# Patient Record
Sex: MALE | Race: WHITE
[De-identification: names, ages, dates, MRNs, and addresses within clinical notes are randomized per-mention and may not be internally consistent; named-entity substitution may affect disease eponyms.]

---

## 2017-05-07 ENCOUNTER — HOSPITAL ENCOUNTER (INPATIENT)
Dept: HOSPITAL 75 - ER | Age: 27
LOS: 2 days | Discharge: HOME HEALTH | DRG: 482 | End: 2017-05-09
Attending: FAMILY MEDICINE | Admitting: FAMILY MEDICINE
Payer: MEDICAID

## 2017-05-07 VITALS — WEIGHT: 165.19 LBS | BODY MASS INDEX: 21.2 KG/M2 | HEIGHT: 74 IN

## 2017-05-07 VITALS — SYSTOLIC BLOOD PRESSURE: 124 MMHG | DIASTOLIC BLOOD PRESSURE: 79 MMHG

## 2017-05-07 VITALS — SYSTOLIC BLOOD PRESSURE: 125 MMHG | DIASTOLIC BLOOD PRESSURE: 86 MMHG

## 2017-05-07 VITALS — SYSTOLIC BLOOD PRESSURE: 124 MMHG | DIASTOLIC BLOOD PRESSURE: 76 MMHG

## 2017-05-07 VITALS — DIASTOLIC BLOOD PRESSURE: 80 MMHG | SYSTOLIC BLOOD PRESSURE: 118 MMHG

## 2017-05-07 VITALS — DIASTOLIC BLOOD PRESSURE: 79 MMHG | SYSTOLIC BLOOD PRESSURE: 124 MMHG

## 2017-05-07 DIAGNOSIS — E10.40: ICD-10-CM

## 2017-05-07 DIAGNOSIS — S01.511A: ICD-10-CM
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
## 2017-05-07 DIAGNOSIS — J45.909: ICD-10-CM

## 2017-05-07 DIAGNOSIS — S72.142A: Primary | ICD-10-CM
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
## 2017-05-07 DIAGNOSIS — K21.9: ICD-10-CM
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
## 2017-05-07 DIAGNOSIS — Z79.4: ICD-10-CM

## 2017-05-07 LAB
ALBUMIN SERPL-MCNC: 3.9 G/DL (ref 3.2–4.5)
ALT SERPL-CCNC: 22 U/L (ref 0–55)
ANION GAP SERPL CALC-SCNC: 16 MMOL/L (ref 5–14)
AST SERPL-CCNC: 18 U/L (ref 5–34)
BASOPHILS # BLD AUTO: 0 10^3/UL (ref 0–0.1)
BASOPHILS NFR BLD AUTO: 0 % (ref 0–10)
BILIRUB SERPL-MCNC: 0.4 MG/DL (ref 0.1–1)
BUN SERPL-MCNC: 7 MG/DL (ref 7–18)
BUN/CREAT SERPL: 7
CALCIUM SERPL-MCNC: 9.4 MG/DL (ref 8.5–10.1)
CHLORIDE SERPL-SCNC: 102 MMOL/L (ref 98–107)
CO2 SERPL-SCNC: 22 MMOL/L (ref 21–32)
CREAT SERPL-MCNC: 0.95 MG/DL (ref 0.6–1.3)
EOSINOPHIL # BLD AUTO: 0.4 10^3/UL (ref 0–0.3)
EOSINOPHIL NFR BLD AUTO: 3 % (ref 0–10)
ERYTHROCYTE [DISTWIDTH] IN BLOOD BY AUTOMATED COUNT: 11.9 % (ref 10–14.5)
ETHANOL SERPL-MCNC: < 10 MG/DL (ref ?–10)
GFR SERPLBLD BASED ON 1.73 SQ M-ARVRAT: > 60 ML/MIN
GLUCOSE SERPL-MCNC: 230 MG/DL (ref 70–105)
INR PPP: 1 (ref 0.8–1.4)
LYMPHOCYTES # BLD AUTO: 2.5 X 10^3 (ref 1–4)
LYMPHOCYTES NFR BLD AUTO: 17 % (ref 12–44)
MCH RBC QN AUTO: 32 PG (ref 25–34)
MCHC RBC AUTO-ENTMCNC: 35 G/DL (ref 32–36)
MCV RBC AUTO: 91 FL (ref 80–99)
MONOCYTES # BLD AUTO: 0.8 X 10^3 (ref 0–1)
MONOCYTES NFR BLD AUTO: 6 % (ref 0–12)
NEUTROPHILS # BLD AUTO: 10.9 X 10^3 (ref 1.8–7.8)
NEUTROPHILS NFR BLD AUTO: 74 % (ref 42–75)
PLATELET # BLD: 387 10^3/UL (ref 130–400)
PMV BLD AUTO: 9.4 FL (ref 7.4–10.4)
POTASSIUM SERPL-SCNC: 3.6 MMOL/L (ref 3.6–5)
PROT SERPL-MCNC: 6.3 G/DL (ref 6.4–8.2)
PROTHROMBIN TIME: 13.1 SEC (ref 12.2–14.7)
RBC # BLD AUTO: 4.25 10^6/UL (ref 4.35–5.85)
SODIUM SERPL-SCNC: 140 MMOL/L (ref 135–145)
WBC # BLD AUTO: 14.6 10^3/UL (ref 4.3–11)

## 2017-05-07 PROCEDURE — 83036 HEMOGLOBIN GLYCOSYLATED A1C: CPT

## 2017-05-07 PROCEDURE — 80048 BASIC METABOLIC PNL TOTAL CA: CPT

## 2017-05-07 PROCEDURE — 94664 DEMO&/EVAL PT USE INHALER: CPT

## 2017-05-07 PROCEDURE — 82962 GLUCOSE BLOOD TEST: CPT

## 2017-05-07 PROCEDURE — 36415 COLL VENOUS BLD VENIPUNCTURE: CPT

## 2017-05-07 PROCEDURE — 87081 CULTURE SCREEN ONLY: CPT

## 2017-05-07 PROCEDURE — 96374 THER/PROPH/DIAG INJ IV PUSH: CPT

## 2017-05-07 PROCEDURE — 73502 X-RAY EXAM HIP UNI 2-3 VIEWS: CPT

## 2017-05-07 PROCEDURE — 80053 COMPREHEN METABOLIC PANEL: CPT

## 2017-05-07 PROCEDURE — 72170 X-RAY EXAM OF PELVIS: CPT

## 2017-05-07 PROCEDURE — 0QS706Z REPOSITION LEFT UPPER FEMUR WITH INTRAMEDULLARY INTERNAL FIXATION DEVICE, OPEN APPROACH: ICD-10-PCS | Performed by: ORTHOPAEDIC SURGERY

## 2017-05-07 PROCEDURE — 85610 PROTHROMBIN TIME: CPT

## 2017-05-07 PROCEDURE — 80320 DRUG SCREEN QUANTALCOHOLS: CPT

## 2017-05-07 PROCEDURE — 85025 COMPLETE CBC W/AUTO DIFF WBC: CPT

## 2017-05-07 PROCEDURE — 71010: CPT

## 2017-05-07 PROCEDURE — 96375 TX/PRO/DX INJ NEW DRUG ADDON: CPT

## 2017-05-07 RX ADMIN — MORPHINE SULFATE PRN MG: 4 INJECTION, SOLUTION INTRAMUSCULAR; INTRAVENOUS at 19:43

## 2017-05-07 RX ADMIN — HYDROMORPHONE HYDROCHLORIDE PRN MG: 2 INJECTION, SOLUTION INTRAMUSCULAR; INTRAVENOUS; SUBCUTANEOUS at 09:32

## 2017-05-07 RX ADMIN — INSULIN HUMAN SCH UNIT: 100 INJECTION, SOLUTION PARENTERAL at 12:05

## 2017-05-07 RX ADMIN — INSULIN HUMAN SCH UNIT: 100 INJECTION, SOLUTION PARENTERAL at 17:43

## 2017-05-07 RX ADMIN — MEPERIDINE HYDROCHLORIDE PRN MG: 50 INJECTION INTRAMUSCULAR; INTRAVENOUS; SUBCUTANEOUS at 13:29

## 2017-05-07 RX ADMIN — Medication SCH ML: at 06:26

## 2017-05-07 RX ADMIN — Medication SCH ML: at 13:43

## 2017-05-07 RX ADMIN — SODIUM CHLORIDE SCH MLS/HR: 900 INJECTION INTRAVENOUS at 21:02

## 2017-05-07 RX ADMIN — INSULIN HUMAN SCH UNIT: 100 INJECTION, SOLUTION PARENTERAL at 06:27

## 2017-05-07 RX ADMIN — SODIUM CHLORIDE SCH MLS/HR: 900 INJECTION, SOLUTION INTRAVENOUS at 15:39

## 2017-05-07 RX ADMIN — OXYCODONE HYDROCHLORIDE AND ACETAMINOPHEN PRN TAB: 5; 325 TABLET ORAL at 21:12

## 2017-05-07 RX ADMIN — HYDROCODONE BITARTRATE AND ACETAMINOPHEN PRN TAB: 5; 325 TABLET ORAL at 15:35

## 2017-05-07 RX ADMIN — MEPERIDINE HYDROCHLORIDE PRN MG: 50 INJECTION INTRAMUSCULAR; INTRAVENOUS; SUBCUTANEOUS at 13:23

## 2017-05-07 RX ADMIN — Medication SCH ML: at 22:08

## 2017-05-07 RX ADMIN — MORPHINE SULFATE PRN MG: 4 INJECTION, SOLUTION INTRAMUSCULAR; INTRAVENOUS at 14:24

## 2017-05-07 RX ADMIN — SODIUM CHLORIDE SCH MLS/HR: 900 INJECTION, SOLUTION INTRAVENOUS at 13:43

## 2017-05-07 RX ADMIN — HYDROMORPHONE HYDROCHLORIDE PRN MG: 2 INJECTION, SOLUTION INTRAMUSCULAR; INTRAVENOUS; SUBCUTANEOUS at 06:28

## 2017-05-07 RX ADMIN — SODIUM CHLORIDE SCH MLS/HR: 900 INJECTION, SOLUTION INTRAVENOUS at 06:27

## 2017-05-07 RX ADMIN — HYDROCODONE BITARTRATE AND ACETAMINOPHEN PRN TAB: 5; 325 TABLET ORAL at 19:43

## 2017-05-07 RX ADMIN — SODIUM CHLORIDE, SODIUM LACTATE, POTASSIUM CHLORIDE, AND CALCIUM CHLORIDE PRN MLS/HR: 600; 310; 30; 20 INJECTION, SOLUTION INTRAVENOUS at 11:36

## 2017-05-07 RX ADMIN — SODIUM CHLORIDE, SODIUM LACTATE, POTASSIUM CHLORIDE, AND CALCIUM CHLORIDE PRN MLS/HR: 600; 310; 30; 20 INJECTION, SOLUTION INTRAVENOUS at 12:25

## 2017-05-07 RX ADMIN — WARFARIN SODIUM SCH MG: 5 TABLET ORAL at 17:46
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
## 2017-05-07 NOTE — PROGRESS NOTE-POST OPERATIVE
Post-Operative Progess Note


Surgeon (s)/Assistant (s)


Surgeon


IZABELLA WILKINSON MD


Assistant:  None





Pre-Operative Diagnosis


left hip closed intertroch fracture displaced initial encounter





Post-Operative Diagnosis





Same





Procedure & Operative Findings


Date of Procedure


5/7/17


Procedure Preformed/Findings


open reduction internal fixation with intramedullary nail (Synthes TFN). 

Anatomic reduction, two distal screws added


Anesthesia Type


General





Estimated Blood Loss


Estimated blood loss (mL):  100 ml





Specimens/Packing


Specimens Removed


None


Packing:  


none











IZABELLA WILKINSON MD May 7, 2017 13:07
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
## 2017-05-08 VITALS — SYSTOLIC BLOOD PRESSURE: 126 MMHG | DIASTOLIC BLOOD PRESSURE: 75 MMHG

## 2017-05-08 VITALS — DIASTOLIC BLOOD PRESSURE: 72 MMHG | SYSTOLIC BLOOD PRESSURE: 119 MMHG

## 2017-05-08 VITALS — DIASTOLIC BLOOD PRESSURE: 67 MMHG | SYSTOLIC BLOOD PRESSURE: 121 MMHG

## 2017-05-08 VITALS — SYSTOLIC BLOOD PRESSURE: 127 MMHG | DIASTOLIC BLOOD PRESSURE: 69 MMHG

## 2017-05-08 VITALS — SYSTOLIC BLOOD PRESSURE: 112 MMHG | DIASTOLIC BLOOD PRESSURE: 68 MMHG

## 2017-05-08 LAB
INR PPP: 1.1 (ref 0.8–1.4)
PROTHROMBIN TIME: 13.7 SEC (ref 12.2–14.7)

## 2017-05-08 RX ADMIN — MORPHINE SULFATE PRN MG: 4 INJECTION, SOLUTION INTRAMUSCULAR; INTRAVENOUS at 10:28

## 2017-05-08 RX ADMIN — HYDROCODONE BITARTRATE AND ACETAMINOPHEN PRN TAB: 5; 325 TABLET ORAL at 23:31

## 2017-05-08 RX ADMIN — HYDROCODONE BITARTRATE AND ACETAMINOPHEN PRN TAB: 5; 325 TABLET ORAL at 19:10

## 2017-05-08 RX ADMIN — MORPHINE SULFATE PRN MG: 4 INJECTION, SOLUTION INTRAMUSCULAR; INTRAVENOUS at 13:02

## 2017-05-08 RX ADMIN — INSULIN HUMAN SCH UNIT: 100 INJECTION, SOLUTION PARENTERAL at 06:04

## 2017-05-08 RX ADMIN — SODIUM CHLORIDE SCH MLS/HR: 900 INJECTION, SOLUTION INTRAVENOUS at 06:07

## 2017-05-08 RX ADMIN — INSULIN HUMAN SCH UNIT: 100 INJECTION, SOLUTION PARENTERAL at 19:15

## 2017-05-08 RX ADMIN — HYDROCODONE BITARTRATE AND ACETAMINOPHEN PRN TAB: 5; 325 TABLET ORAL at 06:56

## 2017-05-08 RX ADMIN — Medication SCH ML: at 12:55

## 2017-05-08 RX ADMIN — HYDROCODONE BITARTRATE AND ACETAMINOPHEN PRN TAB: 5; 325 TABLET ORAL at 02:52

## 2017-05-08 RX ADMIN — INSULIN HUMAN SCH UNIT: 100 INJECTION, SOLUTION PARENTERAL at 12:55

## 2017-05-08 RX ADMIN — Medication SCH ML: at 23:27

## 2017-05-08 RX ADMIN — SODIUM CHLORIDE SCH MLS/HR: 900 INJECTION INTRAVENOUS at 05:02

## 2017-05-08 RX ADMIN — HYDROCODONE BITARTRATE AND ACETAMINOPHEN PRN TAB: 5; 325 TABLET ORAL at 18:35

## 2017-05-08 RX ADMIN — MORPHINE SULFATE PRN MG: 4 INJECTION, SOLUTION INTRAMUSCULAR; INTRAVENOUS at 02:52

## 2017-05-08 RX ADMIN — WARFARIN SODIUM SCH MG: 5 TABLET ORAL at 18:35

## 2017-05-08 RX ADMIN — MORPHINE SULFATE PRN MG: 4 INJECTION, SOLUTION INTRAMUSCULAR; INTRAVENOUS at 00:09

## 2017-05-08 RX ADMIN — INSULIN HUMAN SCH UNIT: 100 INJECTION, SOLUTION PARENTERAL at 00:01

## 2017-05-08 RX ADMIN — Medication SCH ML: at 06:04

## 2017-05-08 RX ADMIN — OXYCODONE HYDROCHLORIDE AND ACETAMINOPHEN PRN TAB: 5; 325 TABLET ORAL at 04:47
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
## 2017-05-08 NOTE — ANESTHESIA-GENERAL POST-OP
General


Patient Condition


Mental Status/LOC:  Same as Preop


Cardiovascular:  Satisfactory


Nausea/Vomiting:  Absent


Respiratory:  Satisfactory


Pain:  Controlled


Complications:  Absent





Post Op Complications


Complications


None





Follow Up Care/Instructions


Patient Instructions


None needed.





Anesthesia/Patient Condition


Patient Condition


Patient is doing well, no complaints, stable vital signs, no apparent adverse 

anesthesia problems.   


No complications reported per nursing.











ERNESTO COPPOLA CRNA May 8, 2017 10:51

## 2017-05-08 NOTE — PROGRESS NOTE-STANDARD
Standard Progress Note


Progress Notes/Assess & Plan


Progress/Assessment & Plan


Subjective: Patient reports expected post op discomfort. He is able to bear 

weight but reports some stiffness. He denies fever and any other complaints.


Objective: Pt alert and oriented, dressings intact, Has soft thigh and calf. He 

is able to tolerate gentle AAROM. Distal neurovascular status is intact.


Assessment: S/P intertrochanteric Fx ORIF





Plan: Continue with PT and current treatment plan. Anticipate discharge 

tomorrow. Will f/u in our clinic in 2weeks.











STEPHANIE NINO MD May 8, 2017 11:36

## 2017-05-08 NOTE — PROGRESS NOTE-STANDARD
Standard Progress Note


Progress Notes/Assess & Plan


Progress/Assessment & Plan


S-  Pain much better





O- The patient is alert and orietated.


gentle AAROM left hip and knee without untoward discomfort.





A- Stable orthowise POD#1 from ORIF left femur intertroch fx with IM nail





P- OK to D/C to home full weight bear today. 


F/U with Dr. Finley 8 - 12 days


Home on  QD for DVT prophylaxis.


Use crurches for 2 -4 weeks with weight bear as tolerated, goal of full weight 

bearing ASAP. AVOID NON-WEIGHT BEARING.


Final Diagnosis


Ortho diagnosis:   Status Post left hip intertrochanteric fx ORIF.











IZABELLA WILKINSON MD May 8, 2017 09:01
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
## 2017-05-08 NOTE — PHYSICAL THERAPY DAILY NOTE
PT Daily Note-Current


Subjective


Patient agrees to therapy.





Pain





   Numeric Pain Scale:  8


   Location:  Left


   Location Body Site:  Hip


   Pain Description:  Acute





Mental Status


Patient Orientation:  Normal For Age





Transfers


              Functional [place] Measure


0=Not Assessed/NA   4=Minimal Assistance


1=Total Assistance   5=Supervision or Setup


2=Maximal Assistance   6=Modified [place]


3=Moderate Assistance   7=Complete IndependenceIRFPAI Quality Coding Scale











6 Independent with activity with or without an assistive device


 


5  Patient requires set up or clean up by helper.  Patient completes activity  

by  themselves


 


4 Supervision or touching assist (CGA). [place] provide cues , steadying assist


 


3 The helper provides less than half the effort to complete the activity


 


2 The helper provides more than half the effort to complete the activity


 


1 Dependent.  The helper does all the effort to complete an activity 


 


7 Patient refused to complete or attempt activity


 


9 The patient did not perform the activity before the current illness or injury


 


88 Not attempted due to Medical conditions or safety concerns








Transfers (B, C, W/C) (FIM):  5


Scootin


Rollin


Supine to/from Sit:  5


Sit to/from Stand:  5


adamantly declined to sit in recliner due to left LE pain





Weight Bearing


Weight Bearing Restriction:  Weight Bearing/Tolerated


Location Restriction:  L LE





Gait Training


Gait (FIM):  2


Distance (FIM):  8=617-32 ft


Distance:  75'


Gait Level of Assist:  5


Gait Persons Needed:  1


Gait Assistive Device:  FWW


minimal weight bearing left LE due to pain.  Patient prefers to be TTWB left LE 

for comfort.





Exercises


Seated Therapy Exercises:  Ankle pumps, Long arc quads


Seated Reps:  15





Assessment


Patient tolerated treatment well and is sitting EOB with OT in to assess 

equipment needs.  PT to increase activity as tolerated by patient.





PT Long Term Goals


Long Term Goals


PT Long Term Goals Time Frame:  May 15, 2017


Transfers (B,C,W/C) (FIM):  6


Gait (FIM):  6


Gait distance (FIM):  3=150 ft


Gait Level of Assist:  6


Gait Assistive Device:  FWW


Stairs (FIM):  2


# of Steps:  4


Stairs Level Of Assist:  6





PT Plan


Treatment/Plan


Treatment Plan:  Continue Plan of Care


Treatment Plan:  Bed Mobility, Education, Functional Activity Frank, Functional 

Strength, Gait, Safety, Therapeutic Exercise, Transfers


Treatment Duration:  May 15, 2017


Visits Per Week:  11





Time/GCodes


Time In:  1135


Time Out:  1145


Total Billed Treatment Time:  10


Total Billed Treatment


1 visit


FA 10 min











ELIA ZHANG PT May 8, 2017 11:56

## 2017-05-09 VITALS — DIASTOLIC BLOOD PRESSURE: 72 MMHG | SYSTOLIC BLOOD PRESSURE: 112 MMHG

## 2017-05-09 VITALS — SYSTOLIC BLOOD PRESSURE: 130 MMHG | DIASTOLIC BLOOD PRESSURE: 78 MMHG

## 2017-05-09 LAB
ANION GAP SERPL CALC-SCNC: 8 MMOL/L (ref 5–14)
BASOPHILS # BLD AUTO: 0 10^3/UL (ref 0–0.1)
BASOPHILS NFR BLD AUTO: 0 % (ref 0–10)
BUN SERPL-MCNC: 6 MG/DL (ref 7–18)
BUN/CREAT SERPL: 9
CALCIUM SERPL-MCNC: 8.5 MG/DL (ref 8.5–10.1)
CHLORIDE SERPL-SCNC: 103 MMOL/L (ref 98–107)
CO2 SERPL-SCNC: 26 MMOL/L (ref 21–32)
CREAT SERPL-MCNC: 0.69 MG/DL (ref 0.6–1.3)
EOSINOPHIL # BLD AUTO: 0.6 10^3/UL (ref 0–0.3)
EOSINOPHIL NFR BLD AUTO: 6 % (ref 0–10)
ERYTHROCYTE [DISTWIDTH] IN BLOOD BY AUTOMATED COUNT: 11.8 % (ref 10–14.5)
GFR SERPLBLD BASED ON 1.73 SQ M-ARVRAT: > 60 ML/MIN
GLUCOSE SERPL-MCNC: 151 MG/DL (ref 70–105)
INR PPP: 1.2 (ref 0.8–1.4)
LYMPHOCYTES # BLD AUTO: 3.4 X 10^3 (ref 1–4)
LYMPHOCYTES NFR BLD AUTO: 35 % (ref 12–44)
MCH RBC QN AUTO: 31 PG (ref 25–34)
MCHC RBC AUTO-ENTMCNC: 34 G/DL (ref 32–36)
MCV RBC AUTO: 94 FL (ref 80–99)
MONOCYTES # BLD AUTO: 0.9 X 10^3 (ref 0–1)
MONOCYTES NFR BLD AUTO: 10 % (ref 0–12)
NEUTROPHILS # BLD AUTO: 4.7 X 10^3 (ref 1.8–7.8)
NEUTROPHILS NFR BLD AUTO: 49 % (ref 42–75)
PLATELET # BLD: 239 10^3/UL (ref 130–400)
PMV BLD AUTO: 10.2 FL (ref 7.4–10.4)
POTASSIUM SERPL-SCNC: 3.4 MMOL/L (ref 3.6–5)
PROTHROMBIN TIME: 14.7 SEC (ref 12.2–14.7)
RBC # BLD AUTO: 3.5 10^6/UL (ref 4.35–5.85)
SODIUM SERPL-SCNC: 137 MMOL/L (ref 135–145)
WBC # BLD AUTO: 9.7 10^3/UL (ref 4.3–11)

## 2017-05-09 RX ADMIN — INSULIN HUMAN SCH UNIT: 100 INJECTION, SOLUTION PARENTERAL at 06:02

## 2017-05-09 RX ADMIN — Medication SCH ML: at 05:08

## 2017-05-09 RX ADMIN — HYDROCODONE BITARTRATE AND ACETAMINOPHEN PRN TAB: 5; 325 TABLET ORAL at 11:57

## 2017-05-09 RX ADMIN — HYDROCODONE BITARTRATE AND ACETAMINOPHEN PRN TAB: 5; 325 TABLET ORAL at 05:08

## 2017-05-09 RX ADMIN — MORPHINE SULFATE PRN MG: 4 INJECTION, SOLUTION INTRAMUSCULAR; INTRAVENOUS at 08:43

## 2017-05-09 RX ADMIN — INSULIN HUMAN SCH UNIT: 100 INJECTION, SOLUTION PARENTERAL at 00:02

## 2017-05-09 RX ADMIN — INSULIN HUMAN SCH UNIT: 100 INJECTION, SOLUTION PARENTERAL at 11:51
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
## 2017-05-09 NOTE — PHYSICAL THERAPY DAILY NOTE
PT Daily Note-Current


Subjective


Patient agrees to PT.





Pain





   Numeric Pain Scale:  10-Worst Possible Pain


   Location:  Left


   Location Body Site:  Hip


   Pain Description:  Acute





Mental Status


Patient Orientation:  Normal For Age





Transfers


              Functional [place] Measure


0=Not Assessed/NA   4=Minimal Assistance


1=Total Assistance   5=Supervision or Setup


2=Maximal Assistance   6=Modified [place]


3=Moderate Assistance   7=Complete IndependenceIRFPAI Quality Coding Scale











6 Independent with activity with or without an assistive device


 


5  Patient requires set up or clean up by helper.  Patient completes activity  

by  themselves


 


4 Supervision or touching assist (CGA). [place] provide cues , steadying assist


 


3 The helper provides less than half the effort to complete the activity


 


2 The helper provides more than half the effort to complete the activity


 


1 Dependent.  The helper does all the effort to complete an activity 


 


7 Patient refused to complete or attempt activity


 


9 The patient did not perform the activity before the current illness or injury


 


88 Not attempted due to Medical conditions or safety concerns








Transfers (B, C, W/C) (FIM):  6


Scootin


Rollin


Supine to/from Sit:  6


Sit to/from Stand:  6





Gait Training


Gait (FIM):  6


Distance (FIM):  3=150 ft


Distance:  150'


Gait Level of Assist:  6


Gait Assistive Device:  FWW


antalgic, WBAT left LE with patient demonstrating TTWB left LE.  FWW issued for 

home use





Stair Training


adamantly declined stair training; education with patient on performing steps 

to ensure safe return to home, patient continued to become agitated and 

declined to attempt





Assessment


Patient is currently at [facility] with gross motor skill.  

Patient declined to perform stair training due to pain left LE even after 

education on importance of demonstrating good technique to ensure safe return 

to home with family.





PT Long Term Goals


Long Term Goals


PT Long Term Goals Time Frame:  May 15, 2017


Transfers (B,C,W/C) (FIM):  6


Gait (FIM):  6


Gait distance (FIM):  3=150 ft


Gait Level of Assist:  6


Gait Assistive Device:  FWW


Stairs (FIM):  2


# of Steps:  4


Stairs Level Of Assist:  6





PT Plan


Treatment/Plan


Treatment Plan:  Discontinue PT


Treatment Plan:  Bed Mobility, Education, Functional Activity Frank, Functional 

Strength, Gait, Safety, Therapeutic Exercise, Transfers


Treatment Duration:  May 15, 2017


Visits Per Week:  11





Time/GCodes


Time In:  925


Time Out:  940


Total Billed Treatment Time:  15


Total Billed Treatment


1 visit


GT 15 min











ELIA ZHANG PT May 9, 2017 09:58
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
## 2017-06-02 NOTE — OPERATIVE REPORT
DATE OF SERVICE:  05/07/2017



NOTE:  

Initial dictation either lost or omitted.  This is a redictation utilizing notes

and memory.



PREOPERATIVE DIAGNOSES:

Left hip intertrochanteric fracture with some subtrochanteric extension.



POSTOPERATIVE DIAGNOSIS:

Left hip intertrochanteric fracture with some subtrochanteric extension.



PROCEDURE:

Surgery on Left hip to line up bone and fix with hardware (internal fixation

with intramedullary Synthes TFN nail).



SURGEON:

Pa Evangelista M.D.



COMPLICATIONS:

None.



BLOOD LOSS:

Minimal.



SPECIMENS:

None.



FINDINGS:

Adequate alignment was obtained and adequate fixation was also utilized.  Final

construct looked near anatomic and should heal well.



NARRATIVE SUMMARY:

The patient was taken to the operating room after standard nursing and

anesthesia, preoperative identification, evaluation and counseling.  The lower

extremity was prepped and draped in the usual orthopedic fashion.  A C-arm was

used to locate the appropriate approach while the patient was on the fracture

table.  Appropriate manipulation of lower extremity was accomplished to get a

good look at the reduced intertrochanteric area, the proximal femur. 

Longitudinal incision was made above the greater trochanter approximately 6-8 cm

in length.  Soft tissue dissection was accomplished down to where I could

palpate that greater trochanter through a small incision in the iliotibial band.

 C-arm was used to place a guidewire followed by a proximal reamer.  Some distal

reaming was accomplished and the TFN nail was then placed in the femur itself. 

The depth of the nail was guided by the C-arm visualization.  Rotation was also

checked in the AP and lateral using the guide hole and the IM nail carbon fiber

handle.  Guidewire was then placed down the femoral neck entering through the

lateral femur, cortex.  Once the orientation of the guidewire was properly

aligned, then a lateral drill hole followed by a deep drill followed by placing

the helical blade was all accomplished and, of course, this was all done with

the nail whose length had been determined ahead of time to be appropriate using

C-arm.  Distal fixation screws were placed.  Wounds were irrigated.  Wounds were

then closed in 2 layers.  The patient was then taken to recovery in stable

condition.





Job ID: [id number]

DocumentID: [id number]

Dictated Date:  06/01/2017 15:55:50

Transcription Date: 06/02/2017 02:27:34

Dictated By: MD ARTIE CESPEDES

## 2018-09-08 ENCOUNTER — HOSPITAL ENCOUNTER (EMERGENCY)
Dept: HOSPITAL 75 - ER | Age: 28
Discharge: HOME | End: 2018-09-08
Payer: MEDICAID

## 2018-09-08 VITALS — DIASTOLIC BLOOD PRESSURE: 97 MMHG | SYSTOLIC BLOOD PRESSURE: 126 MMHG

## 2018-09-08 VITALS — WEIGHT: 160 LBS | HEIGHT: 70 IN | BODY MASS INDEX: 22.9 KG/M2

## 2018-09-08 DIAGNOSIS — F12.10: ICD-10-CM

## 2018-09-08 DIAGNOSIS — K21.9: ICD-10-CM

## 2018-09-08 DIAGNOSIS — E10.42: ICD-10-CM

## 2018-09-08 DIAGNOSIS — Z79.4: ICD-10-CM

## 2018-09-08 DIAGNOSIS — J45.909: ICD-10-CM

## 2018-09-08 DIAGNOSIS — Z87.19: ICD-10-CM

## 2018-09-08 DIAGNOSIS — Z88.8: ICD-10-CM

## 2018-09-08 DIAGNOSIS — Z79.01: ICD-10-CM

## 2018-09-08 DIAGNOSIS — F41.9: ICD-10-CM

## 2018-09-08 DIAGNOSIS — L03.113: ICD-10-CM

## 2018-09-08 DIAGNOSIS — Z80.8: ICD-10-CM

## 2018-09-08 DIAGNOSIS — F31.9: ICD-10-CM

## 2018-09-08 DIAGNOSIS — L02.413: Primary | ICD-10-CM

## 2018-09-08 DIAGNOSIS — Z77.22: ICD-10-CM

## 2018-09-08 PROCEDURE — 87186 SC STD MICRODIL/AGAR DIL: CPT

## 2018-09-08 PROCEDURE — 87205 SMEAR GRAM STAIN: CPT

## 2018-09-08 PROCEDURE — 87077 CULTURE AEROBIC IDENTIFY: CPT

## 2018-09-08 PROCEDURE — 87070 CULTURE OTHR SPECIMN AEROBIC: CPT

## 2018-09-08 PROCEDURE — 96372 THER/PROPH/DIAG INJ SC/IM: CPT

## 2018-09-08 PROCEDURE — 82962 GLUCOSE BLOOD TEST: CPT

## 2018-09-08 NOTE — ED INTEGUMENTARY GENERAL
General


Chief Complaint:  Skin/Wound Problems


Stated Complaint:  LYMPH NODE BURST


Nursing Triage Note:  


ARRIVED VIA AMB TO ROOM 10 WITH COMPLAINTS OF A SWOLLEN LYMPH NODE ON HIS BACK 


THAT OPENED UP AND DRAINED TODAY.  PT CAME BY AMBULANCE BECAUSE HE DID NOT HAVE 


A RIDE OR COULD AFFORD A TAXI.


Source:  patient


Exam Limitations:  no limitations





History of Present Illness


Date Seen by Provider:  Sep 8, 2018


Time Seen by Provider:  15:10


Initial Comments


Patient is a 28-year-old male who is brought into the emergency room with 

complaints of a "lymph node" on the back of his right shoulder. He reports that 

he's noticed the redness to the area 3-4 days and yesterday it turned into a 

very large pimple in this morning he opened up and drained. Patient did arrive 

to the emergency room by [facility] due to no form of transportation. 

The patient is a known diabetic and the glucometer read his blood sugar as 480. 

He does have an insulin pump and he ingested the pump and gave himself a bolus 

of insulin.


Timing/Duration:  week


Associated Symptoms:  blisters (pimple to his right shoulder blade.)





Allergies and Home Medications


Allergies


Coded Allergies:  


     fluoxetine (Verified  Allergy, Unknown, 6/22/09)


     trazodone (Unverified  Allergy, Unknown, 11/30/14)





Home Medications


Bisacodyl 5 Mg Tablet.dr, 5 MG PO DAILY PRN for CONSTIPATION-1ST LINE


   Prescribed by: LLUVIA GA on 5/9/17 1120


Hydrocodone Bit/Acetaminophen 1 Each Tablet, 1-2 TAB PO Q6H PRN for PAIN-

MODERATE TO SEVERE


   Prescribed by: LLUVIA GA on 5/9/17 1120


Insulin Regular, Human 100 Unit/1 Ml Vial, 10-15 UNIT SQ AC, (Reported)


   10-15 UNITS BASED ON CARB COUNTING 


Sulfamethoxazole/Trimethoprim 1 Each Tablet, 1 EACH PO BID


   Prescribed by: ANDI RILEY on 9/8/18 1627


Warfarin Sodium 5 Mg Tablet, 5 MG PO DAILY@1800


   Prescribed by: LLUVIA GA on 5/9/17 1120





Patient Home Medication List


Home Medication List Reviewed:  Yes





Review of Systems


Review of Systems


Constitutional:  see HPI; No chills, No fever


Skin:  see HPI, lesions (abscess to his right shoulder blade.)





All Other Systems Reviewed


Negative Unless Noted:  Yes





Past Medical-Social-Family Hx


Past Med/Social Hx:  Reviewed Nursing Past Med/Soc Hx


Patient Social History


Drug of Choice:  MARIJUANA-EVERY ONCE IN AWHILE


Type Used:  Cigarettes


2nd Hand Smoke Exposure:  Yes


Recent Foreign Travel:  No


Contact w/Someone Who Travel:  No


Recent Infectious Disease Expo:  No


Recent Hopitalizations:  No





Immunizations Up To Date


Tetanus Booster (TDap):  Less than 5yrs


PED Vaccines UTD:  Yes





Seasonal Allergies


Seasonal Allergies:  No





Past Medical History


Surgeries:  Yes (EGD)


Respiratory:  Yes


Asthma


Currently Using CPAP:  No


Currently Using BIPAP:  No


Cardiac:  No


Neurological:  Yes


Neuropathy


Reproductive Disorders:  No


Sexually Transmitted Disease:  No


HIV/AIDS:  No


Genitourinary:  Yes


Prostate Problems


Gastrointestinal:  Yes (current constipation)


Gastroesophageal Reflux, Liver Disease/Jaundice


Musculoskeletal:  No


Endocrine:  Yes (Type I)


Diabetes, Insulin dep


HEENT:  No


Cancer:  No


Psychosocial:  Yes


Anxiety, Bipolar, Depression


Integumentary:  Yes


Eczema


Blood Disorders:  No


Adverse Reaction/Blood Tranf:  No





Family Medical History


Reviewed Nursing Family Hx





Diabetes mellitus


  19 FATHER


  UNCLE


FH: COPD (chronic obstructive pulmonary disease)


  19 MOTHER


FH: brain cancer


  AUNT


FH: diverticulitis


  19 FATHER


Thyroid disease


  19 MOTHER


No Pertinent Family Hx





Physical Exam


Vital Signs





Vital Signs - First Documented








 9/8/18





 15:03


 


Temp 98.0


 


Pulse 121


 


Resp 18


 


B/P (MAP) 126/97 (107)


 


Pulse Ox 98


 


O2 Delivery Room Air





Capillary Refill : Less Than 3 Seconds


General Appearance:  WD/WN, no apparent distress


Cardiovascular:  normal peripheral pulses, regular rate, rhythm, no edema, no 

gallop, no JVD, no murmur


Respiratory:  chest non-tender, lungs clear, normal breath sounds, no 

respiratory distress, no accessory muscle use


Skin:  normal color, warm/dry


Skin Problem Location:  lower extremities (posterior right shoulder.)


Skin Problem Character:  abscess (open and draining abscess.), drainage (

serosanguineous drainage.), erythema (4 centimeter in diameter area of erythema 

around the central punctum that is open and draining)





Progress/Results/Core Measures


Results/Orders


Lab Results





Laboratory Tests








Test


 9/8/18


15:13 9/8/18


16:49 Range/Units


 


 


Glucometer 480 *H 306 H   MG/DL








Micro Results





Microbiology


9/8/18 Gram Stain - Final, Resulted


         


9/8/18 Wound Culture - Preliminary, Resulted


         Staphylococcus aureus





My Orders





Orders - ANDI RILEY


Wound Culture (9/8/18 15:38)


Ceftriaxone For Im Use (Rocephin For Im (9/8/18 15:45)


Lidocaine 1% Inj 20 Ml (Xylocaine 1% Inj (9/8/18 15:54)





Vital Signs/I&O











 9/8/18 9/8/18





 15:03 16:53


 


Temp 98.0 98.0


 


Pulse 121 121


 


Resp 18 18


 


B/P (MAP) 126/97 (107) 126/97 (107)


 


Pulse Ox 98 98


 


O2 Delivery Room Air 














Blood Pressure Mean:  107











FSBG Bedside Testing


Finger Stick Blood Glucose:  480


Blood Glucose Action Taken:  Rn notified





Progress


Progress Note :  


   Time:  16:20


Progress Note


Patient seen and evaluated the patient. Informed of the need for antibiotics. 

He was given a dose of Rocephin in the emergency room and sent home with a 

prescription for antibiotics. Culture was sent of the drainage. He agrees with 

complaints of care, signs or discharge, return precautions were given.





Departure


Impression





 Primary Impression:  


 Abscess


 Additional Impression:  


 Cellulitis


Disposition:  01 HOME, SELF-CARE


Condition:  Stable/Unchanged





Departure-Patient Inst.


Decision time for Depature:  16:26


Referrals:  


[facility] (PCP/Family)


Primary Care Physician


Patient Instructions:  Skin Abscess, Cellulitis (Skin Infection), Adult (DC)





Add. Discharge Instructions:  


Take medication as directed. Follow up with [facility] within 1 week for 

recheck. Return back to the emergency room for any worsening symptoms or 

concerns as needed. All discharge instructions reviewed with patient and/or 

family. Voiced understanding.


Scripts


Sulfamethoxazole/Trimethoprim (Bactrim Ds Tablet) 1 Each Tablet


1 EACH PO BID for 10 Days, #20 TAB


   Prov: ANDI RILEY         9/8/18











ANDI RILEY Sep 8, 2018 15:59

## 2019-07-03 ENCOUNTER — HOSPITAL ENCOUNTER (EMERGENCY)
Dept: HOSPITAL 75 - ER | Age: 29
Discharge: HOME | End: 2019-07-03
Payer: MEDICAID

## 2019-07-03 VITALS — HEIGHT: 70 IN | WEIGHT: 160.19 LBS | BODY MASS INDEX: 22.93 KG/M2

## 2019-07-03 VITALS — DIASTOLIC BLOOD PRESSURE: 92 MMHG | SYSTOLIC BLOOD PRESSURE: 132 MMHG

## 2019-07-03 DIAGNOSIS — Z88.8: ICD-10-CM

## 2019-07-03 DIAGNOSIS — F31.9: ICD-10-CM

## 2019-07-03 DIAGNOSIS — S91.311A: Primary | ICD-10-CM

## 2019-07-03 DIAGNOSIS — Z79.4: ICD-10-CM

## 2019-07-03 DIAGNOSIS — J45.909: ICD-10-CM

## 2019-07-03 DIAGNOSIS — Z79.01: ICD-10-CM

## 2019-07-03 DIAGNOSIS — F41.9: ICD-10-CM

## 2019-07-03 DIAGNOSIS — F17.210: ICD-10-CM

## 2019-07-03 DIAGNOSIS — F12.10: ICD-10-CM

## 2019-07-03 DIAGNOSIS — Z80.8: ICD-10-CM

## 2019-07-03 DIAGNOSIS — K21.9: ICD-10-CM

## 2019-07-03 DIAGNOSIS — E10.40: ICD-10-CM
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
## 2019-07-03 PROCEDURE — 90715 TDAP VACCINE 7 YRS/> IM: CPT

## 2019-07-03 NOTE — XMS REPORT
[facility]

                             Created on: 2019



Dat Ruiz

External Reference #: [id number]

: 1990

Sex: Male



Demographics







                          Address                   [address] [place]  [address]

 

                          Preferred Language        Unknown

 

                          Marital Status            Unknown

 

                          Judaism Affiliation     Unknown

 

                          Race                      Unknown

 

                          Ethnic Group              Unknown





Author







                          Author                    Migration,  Doctor

 

                          Organization              [facility]

 

                          Address                   Unknown

 

                          Phone                     Unavailable







Care Team Providers







                    Care Team Member Name    Role                Phone

 

                    Migration,  Doctor    Unavailable         Unavailable







PROBLEMS







          Type      Condition    ICD9-CM Code    AMI31-DR Code    Onset Dates    Condition Status    SNOMED

 Code

 

           Problem    Cigarette nicotine dependence without complication               F17.210               Active

                                        [id number]

 

             Problem      Erectile dysfunction due to diseases classified elsewhere                 N52.1          

                          Active                    [id number]

 

          Problem    Diabetes              E11.9               Active    [id number]

 

                Problem         Diabetic polyneuropathy associated with type 1 diabetes mellitus                    E10.42

                                        Active              [id number]







ALLERGIES

No Information



ENCOUNTERS







                Encounter       Location        Date            Diagnosis

 

                          [place] [address] [place] [address]

                          05 Mar, 2019               

 

                          [facility]     [id number] [place] [address]

                                         

 

                          [facility]     [id number] [place] [address]

                                         

 

                          [facility]     [id number] [place] [address]

                          28 Dec, 2018              Diabetes E11.9

 

                          [facility]     [id number] [place] [address]

                          25 Oct, 2018               

 

                          [facility]     [id number] [place] [address]

                          22 Oct, 2018               

 

                          [facility]     [id number] [place] [address]

                          19 Oct, 2018              Diabetic polyneuropathy associated with type 1 diabetes mellitus 

E10.42

 

                          [facility]     [id number] [place] [address]

                          15 Oct, 2018              Diabetes E11.9

 

                          [facility]     [id number] [place] [address]

                                         

 

                          [facility]     [address] [address]

                                        Diabetes E11.9

 

                          [facility]     [address] [place] [address]

                                         

 

                          [facility]     [address] [address]

                          14 May, 2018               

 

                          [facility]     [id number] [place] [address]

                                         

 

                          [facility]     [id number] [place] [address]

                                         

 

                          [facility]     [address] [place] [address]

                          20 Dec, 2017               

 

                          [facility]     [id number] [place] [address]

                          05 Dec, 2017               

 

                          [facility]     [address] [address]

                                         

 

                          [facility]     [id number] [place] [address]

                                         

 

                          [facility]     [address] [address]

                                        Diabetes E11.9

 

                          [facility]     [address] [address]

                          24 Aug, 2017              Type 1 diabetes mellitus with other neurologic complication E10.49



 

                          [facility]     [address] [address]

                          24 Aug, 2017               

 

                          [facility]     [address] [address]

                          04 Aug, 2017              Diabetes E11.9 ; Erectile dysfunction due to diseases classified 

elsewhere N52.1 ; Diabetic polyneuropathy associated with type 1 diabetes 
mellitus E10.42 and Cigarette nicotine dependence without complication F17.210

 

                          [facility]     [address] [address]

                                        Hip fracture, left, closed, with routine healing, subsequent encounter

 S72.002D and Allergy, initial encounter T78.40XA

 

                          [facility]     [address] [address]

                                         

 

                          [facility]     [id number] [place] [address]

                          30 May, 2017              Femur fracture, left S72.92XA

 

                          [facility]     [address] [address]

                          30 May, 2017              Femur fracture, left S72.92XA

 

                          [facility]     [address] [address]

                          16 May, 2017              Diabetes E11.9 and Femur fracture, left S72.92XA

 

                          [facility]    [address] [id number]

                          09 May, 2017               

 

                          [facility]    [id number] [place] [address]

                          31 May, 2016               

 

                          [facility]     [address] [address]

                          31 May, 2016               

 

                          [facility]     [id number] [place] [address]

                                         

 

                          [facility]     [id number] [place] [address]

                                         

 

                          [facility]     [id number] [place] [address]

                          24 Mar, 2015               

 

                          [facility]     [id number] [place] [address]

                          24 Mar, 2015               

 

                          [facility]     [address] [address]

                          04 Mar, 2015               

 

                          [facility]     [id number] [place] [address]

                          04 Mar, 2015               

 

                          [facility]     [address] [address]

                          04 Mar, 2015               

 

                          [facility] [place] [facility]     [address] [id number] [place] [address]

                          04 Mar, 2015               

 

                          [facility] [place] [facility]     [address] [id number] [place] [address]

                          30 Dec, 2014               

 

                          [facility] [place] [facility]     [address] [id number] [place] [address]

                          30 Dec, 2014               

 

                          [facility] [place] [facility]     [address] [id number] [place] [address]

                          18 Dec, 2014               

 

                          [facility] [place] [facility]     [address] [id number] [place] [address]

                          18 Dec, 2014               

 

                          [facility] [place] [facility]     [address] [id number] [place] [address]

                          15 Dec, 2014               

 

                          [facility] [place] [facility]     [address] [id number] [place] [address]

                          12 Dec, 2014               

 

                          [facility] [place] [facility]     [address] [id number] [place] [address]

                          12 Dec, 2014               

 

                          [facility] [place] [facility]     [address] [id number] [place] [address]

                          24 Oct, 2014               

 

                          [facility] [place] [facility]     [address] [id number] [place] [address]

                          24 Oct, 2014               

 

                          [facility] [place] [facility]     [address] [id number] [place] [address]

                          21 Oct, 2014               

 

                          [facility] [place] [facility]     [address] [id number] [address]

                          21 Oct, 2014               

 

                          [facility] [place] [facility]     [address] [id number] [place] [address]

                          16 Sep, 2014               

 

                          [facility] [place] [facility]     [address] [id number] [place] [address]

                          16 Sep, 2014               

 

                          [facility] [place] [facility]     [address] [id number] [place] [address]

                                         

 

                          [facility] [place] [facility]     [address] [id number] [place] [address]

                          20 2014               

 

                          [facility]     [address] [id number] [place] [address]

                                         

 

                          [facility] [place] [facility]     [address] [id number] [place] [address]

                          17 2014               

 

                          [facility] [place] [facility]     [address] [id number] [place] [address]

                          10 [place], 2014               

 

                          [facility] [place] [facility]     [address] [id number] [place] [address]

                          [address] [place], 2014               

 

                          [facility] [place] [facility]     [address] [id number] [place] [address]

                                         

 

                          [facility] [place] [facility]     [address] [id number] [place] [address]

                                         

 

                          [facility] [place] [facility]     [address] [id number] [place] [address]

                          15 2013               

 

                          [facility] [place] [facility]     [address] [id number] [place] [address]

                                         

 

                          [facility] [place] [facility]     [address] [id number] [place] [address]

                          14 2013               

 

                          [facility] [place] [facility]     [address] [facility] [id number] [place] [address]

                                         

 

                          [facility] [place] [facility]     [address] [id number] [place] [address]

                                         

 

                          [facility] [place] [facility]     [address] [id number] [place] [address]

                                         

 

                          [facility] [place] [facility]     [address] [id number] [place] [address]

                                         

 

                          [facility] [place] [facility]     [address] [facility] [id number] [place] [address]

                          29 Oct, 2013               

 

                          [facility] [place] [facility]     [address] [id number] [place] [address]

                          29 Oct, 2013               

 

                          [facility]     [id number] [address]

                          13 Aug, 2013               

 

                          [facility]     [address] [address]

                          23 May, 2013               

 

                          [facility]     [id number] [address]

                                         

 

                          [facility]     [address] [address]

                                         

 

                          [facility]     [address] [address]

                          12 Mar, 2013               

 

                          [facility]     [address] [address]

                          05 Mar, 2013               

 

                          [facility]     [address] [address]

                                         

 

                          [facility]     [address] [address]

                                         

 

                          [facility]     [address] [address]

                                         

 

                          [facility]     [address], [place] [address]

                          18 Dec, 2009               

 

                          [facility]     [address] [address]

                                         

 

                          [facility]     [id number] [address]

                          26 Oct, 2009               

 

                          [facility]     [id number] [address]

                          23 Oct, 2009               

 

                          [facility]     [address] [address]

                          14 Sep, 2009               







IMMUNIZATIONS

No Known Immunizations



SOCIAL HISTORY

Never Assessed



REASON FOR VISIT

[facility]



PLAN OF CARE





VITAL SIGNS





MEDICATIONS

Unknown Medications



RESULTS

No Results



PROCEDURES

No Known procedures



INSTRUCTIONS





MEDICATIONS ADMINISTERED

No Known Medications



MEDICAL (GENERAL) HISTORY







                    Type                Description         Date

 

                    Medical History     Diabetes             

 

                    Surgical History    Left hip surgery    

 

                    Surgical History    tonsilectomy         

 

                    Hospitalization History    [facility] for Left hip surgery

## 2019-07-03 NOTE — XMS REPORT
[facility]

                             Created on: 10/30/2018



Dat Ruiz

External Reference #: [id number]

: 1990

Sex: Male



Demographics







                          Address                   [address] [place]  [address]

 

                          Preferred Language        Unknown

 

                          Marital Status            Unknown

 

                          Baptism Affiliation     Unknown

 

                          Race                      Unknown

 

                          Ethnic Group              Unknown





Author







                          Author                    VERONICA MENDES

 

                          [facility]

 

                          Address                   [address] [place]  [address]



 

                          Phone                     [phone]







Care Team Providers







                    Care Team Member Name    Role                Phone

 

                    VERONICA MENDES     Unavailable         [phone]







PROBLEMS







          Type      Condition    ICD9-CM Code    EPP27-PW Code    Onset Dates    Condition Status    SNOMED

 Code

 

             Problem      Erectile dysfunction due to diseases classified elsewhere                 N52.1          

                          Active                    [id number]

 

           Problem    Cigarette nicotine dependence without complication               F17.210               Active

                                        [id number]

 

                Problem         Diabetic polyneuropathy associated with type 1 diabetes mellitus                    E10.42

                                        Active              [id number]

 

          Problem    Diabetes              E11.9               Active    [id number]







ALLERGIES

No Information



ENCOUNTERS







                Encounter       Location        Date            Diagnosis

 

                          [facility]     [id number] [place] [address]

                          25 Oct, 2018               

 

                          [facility]     [id number] [place] [address]

                          22 Oct, 2018               

 

                          [facility]     [id number] [place] [address]

                          19 Oct, 2018              Diabetic polyneuropathy associated with type 1 diabetes mellitus 

E10.42

 

                          [facility]     [id number] [address]

                          15 Oct, 2018              Diabetes E11.9

 

                          [facility]     [id number] [place] [address]

                                         

 

                          [facility]     [id number] [place] [address]

                                        Diabetes E11.9

 

                          [facility]     [id number] [place] [address]

                                         

 

                          [facility]     [id number] [address]

                          14 May, 2018               

 

                          [facility]     [address] [address] [address]

                                         

 

                          [facility]     [id number] [place] [address]

                                         

 

                          [facility]     [id number] [place] [address]

                          20 Dec, 2017               

 

                          [facility]     [id number] [place] [address]

                          05 Dec, 2017               

 

                          [facility]     [id number] [place] [address]

                                         

 

                          [place] [id number] [place] [address]

                                         

 

                          [place] [id number] [place] [address]

                                        Diabetes E11.9

 

                          [place] [id number] [place] [address]

                          24 Aug, 2017              Type 1 diabetes mellitus with other neurologic complication E10.49



 

                          [place] [id number] [place] [address]

                          24 Aug, 2017               

 

                          [place] [id number] [place] [address]

                          04 Aug, 2017              Diabetes E11.9 ; Erectile dysfunction due to diseases classified 

elsewhere N52.1 ; Diabetic polyneuropathy associated with type 1 diabetes 
mellitus E10.42 and Cigarette nicotine dependence without complication F17.210

 

                          [place] [id number] [place] [address]

                                        Hip fracture, left, closed, with routine healing, subsequent encounter

 S72.002D and Allergy, initial encounter T78.40XA

 

                          [place] [id number] [place] [address]

                                         

 

                          [facility]     [id number] [place] [address]

                          30 May, 2017              Femur fracture, left S72.92XA

 

                          [address] [place] [address] [facility] [id number] [place] [address]

                          30 May, 2017              Femur fracture, left S72.92XA

 

                          [facility]     [address] [facility] [id number] [place] [address], [place] [address]

                          16 May, 2017              Diabetes E11.9 and Femur fracture, left S72.92XA

 

                          [facility]    [address] [id number] [address]

                          09 May, 2017               

 

                          [facility]    [address] [facility] [id number] [place] [address]

                          31 May, 2016               

 

                          [facility]     [address] [facility] [id number] [place] [address], [place] [address]

                          31 May, 2016               

 

                          [facility]     [address] [facility] [id number] [place] [address]

                                         

 

                          [facility]     [id number] [place] [address]

                                         

 

                          [facility]     [id number] [address]

                          24 Mar, 2015               

 

                          [facility]     [id number] [place] [address]

                          24 Mar, 2015               

 

                          [facility]     [id number] [address]

                          04 Mar, 2015               

 

                          [facility]     [id number] [address]

                          04 Mar, 2015               

 

                          [facility]     [id number] [address]

                          04 Mar, 2015               

 

                          [facility]     [id number] [address]

                          04 Mar, 2015               

 

                          [facility]     [address] [facility] [id number] [place] [address]

                          30 Dec, 2014               

 

                          [facility]     [id number] [address]

                          30 Dec, 2014               

 

                          [facility] [place] [facility]     [address] [id number] [place] [address]

                          18 Dec, 2014               

 

                          [facility] [place] [facility]     [address] [id number] [place] [address]

                          18 Dec, 2014               

 

                          [facility] [place] [facility]     [address] [id number] [place] [address]

                          15 Dec, 2014               

 

                          [facility] [place] [facility]     [address] [id number] [place] [address]

                          12 Dec, 2014               

 

                          [facility] [place] [facility]     [address] [id number] [place] [address]

                          12 Dec, 2014               

 

                          [facility] [place] [facility]     [address] [id number] [place] [address]

                          24 Oct, 2014               

 

                          [facility] [place] [facility]     [address] [id number] [place] [address]

                          24 Oct, 2014               

 

                          [facility] [place] [facility]     [address] [id number] [place] [address]

                          21 Oct, 2014               

 

                          [facility] [place] [facility]     [address] [id number] [place] [address]

                          21 Oct, 2014               

 

                          [facility] [place] [facility]     [address] [id number] [place] [address]

                          16 Sep, 2014               

 

                          [facility] [place] [facility]     [address] [id number] [place] [address]

                          16 Sep, 2014               

 

                          [facility] [place] [facility]     [address] [id number] [place] [address]

                                         

 

                          [facility] [place] [facility]     [address] [id number] [place] [address]

                                         

 

                          [facility] [place] [facility]     [address] [id number] [place] [address]

                          17 2014               

 

                          [facility] [place] [facility]     [address] [id number] [place] [address]

                          17 2014               

 

                          [facility] [place] [facility]     [address] [id number] [place] [address]

                          [address] [place], 2014               

 

                          [facility] [place] [facility]     [address] [id number] [place] [address]

                          [address] [place], 2014               

 

                          [facility] [place] [facility]     [address] [id number] [place] [address]

                                         

 

                          [facility] [place] [facility]     [address] [id number] [place] [address]

                                         

 

                          [facility] [place] [facility]     [address] [id number] [place] [address]

                          15 Nov, 2013               

 

                          [facility] [place] [facility]     [address] [id number] [place] [address]

                                         

 

                          [facility] [place] [facility]     [address] [id number] [place] [address]

                                         

 

                          [facility] [place] [facility]     [address] [id number] [place] [address]

                                         

 

                          [facility] [place] [facility]     [address] [id number] [place] [address]

                                         

 

                          [facility] [place] [facility]     [address] [id number] [place] [address]

                                         

 

                          [facility] [place] [facility]     [address] [id number] [place] [address]

                                         

 

                          [facility] [place] [facility]     [address] [id number] [place] [address]

                          29 Oct, 2013               

 

                          [facility] [place] [facility]     [address] [id number] [place] [address]

                          29 Oct, 2013               

 

                          [facility] [place] [facility]     [address] [id number] [place] [address]

                          13 Aug, 2013               

 

                          [facility] [place] [facility]     [address] [id number] [place] [address]

                          23 May, 2013               

 

                          [facility] [place] [facility]     [address] [id number] [place] [address]

                                         

 

                          [facility] [place] [facility]     [id number] [address]

                                         

 

                          [facility]     [id number] [address]

                          12 Mar, 2013               

 

                          [facility]     [id number] [address]

                          05 Mar, 2013               

 

                          [facility]     [id number] [address]

                                         

 

                          [facility]     [id number] [address]

                                         

 

                          [facility]     [id number] [address]

                                         

 

                          [facility]     [id number] [address]

                          18 Dec, 2009               

 

                          [facility]     [id number], [place] [address]

                                         

 

                          [facility]     [id number] [address]

                          26 Oct, 2009               

 

                          [facility]     [id number] [address]

                          23 Oct, 2009               

 

                          [facility]     [id number] [address]

                          14 Sep, 2009               







IMMUNIZATIONS

No Known Immunizations



SOCIAL HISTORY

Never Assessed



REASON FOR VISIT

upload rquest to pt via text



PLAN OF CARE





VITAL SIGNS





MEDICATIONS

Unknown Medications



RESULTS

No Results



PROCEDURES

No Known procedures



INSTRUCTIONS





MEDICATIONS ADMINISTERED

No Known Medications



MEDICAL (GENERAL) HISTORY







                    Type                Description         Date

 

                    Medical History     Diabetes             

 

                    Surgical History    Left hip surgery    2017

 

                    Surgical History    tonsilectomy         

 

                    H[facility] History     [facility]

## 2019-07-03 NOTE — XMS REPORT
Continuity of Care Document

                             Created on: 2019



BRIDGETT MICHAUD

External Reference #: [id number]

: 1990

Sex: Male



Demographics







                          Address                   [address] [place]  [address]

 

                          Home Phone                [phone] x

 

                          Preferred Language        Unknown

 

                          Marital Status            Unknown

 

                          Cheondoism Affiliation     Unknown

 

                          Race                      Unknown

 

                          Ethnic Group              Unknown





Author







                          Organization              Unknown

 

                          Address                   Unknown

 

                          Phone                     [phone]



              



Allergies

      





             Active              Description              Code              Type              Severity

                Reaction              Onset              Reported/Identified              Relationship

 to Patient                             Clinical Status        

 

                Yes              fluoxetine              [id number]              Drug Allergy         

             Unknown              N/A                             2009                       

                                                 

 

             Yes              Prozac                             Drug Allergy                        

                                           2009                                      

 

             Yes              Prozac                             Drug Allergy              N/A       

             N/A                             2009                                      

 

                Yes              trazodone              [id number]              Drug Allergy          

             Unknown              N/A                             2014                        

                                                 



                        



Medications

      



There is no data.                  



Problems

      





             Date Dx Coded              Attending              Type              Code              Diagnosis

                                        Diagnosed By        

 

             2009                                            250.03              DIABETES MELLITUS

 TYPE I - UNCONTROLLED                           

 

             2009                                            250.03              DIABETES MELLITUS

 TYPE I - UNCONTROLLED                           

 

             2009                                            250.03              DIABETES MELLITUS

 TYPE I - UNCONTROLLED                           

 

             2009                                            250.03              DIABETES MELLITUS

 TYPE I - UNCONTROLLED                           

 

                2009              ALYSHA WHITFIELD N                              250.03

                          DIABETES MELLITUS TYPE I - UNCONTROLLED                       

 

                2009              MARJ DAVENPORT DO                              250.03           

                          DIABETES MELLITUS TYPE I - UNCONTROLLED                       

 

                2009              ALYSHA WHITFIELD N                              250.03

                          DIABETES MELLITUS TYPE I - UNCONTROLLED                       

 

                2009              ALYSHA WHITFIELD N                              250.03

                          DIABETES MELLITUS TYPE I - UNCONTROLLED                       

 

                2009              ALYSHA WHITFIELD N                              250.03

                          DIABETES MELLITUS TYPE I - UNCONTROLLED                       

 

                2009              ALYSHA WHITFIELD N                              250.03

                          DIABETES MELLITUS TYPE I - UNCONTROLLED                       

 

                2009              ALYSHA WHITFIELD N                              250.03

                          DIABETES MELLITUS TYPE I - UNCONTROLLED                       

 

             2010                                            729.5              PAIN IN LIMB   

                                                 

 

             2010                                            V15.81              OTHER SPECIFIED

 PERSONAL HISTORY PRESENTING HAZARDS TO HEALTH, NONCOMPLIANCE WITH MEDICAL 
TREATMENT                                        

 

             2010                                            729.5              PAIN IN LIMB   

                                                 

 

             2010                                            V15.81              OTHER SPECIFIED

 PERSONAL HISTORY PRESENTING HAZARDS TO HEALTH, NONCOMPLIANCE WITH MEDICAL 
TREATMENT                                        

 

             2010                                            729.5              PAIN IN LIMB   

                                                 

 

             2010                                            V15.81              OTHER SPECIFIED

 PERSONAL HISTORY PRESENTING HAZARDS TO HEALTH, NONCOMPLIANCE WITH MEDICAL 
TREATMENT                                        

 

             2010                                            729.5              PAIN IN LIMB   

                                                 

 

             2010                                            V15.81              OTHER SPECIFIED

 PERSONAL HISTORY PRESENTING HAZARDS TO HEALTH, NONCOMPLIANCE WITH MEDICAL 
TREATMENT                                        

 

                2010              ALYSHA WHITFIELD N                              729.5

                          PAIN IN LIMB                       

 

                2010              ALYSHA WHITFIELD N                              V15.81

                                        OTHER SPECIFIED PERSONAL HISTORY PRESENTING HAZARDS TO HEALTH, NONCOMPLIANCE

 WITH MEDICAL TREATMENT                          

 

             2010              DAVENPORT DO, MARJ K                             729.5              

PAIN IN LIMB                                     

 

                2010              DAVENPORT DO, MARJ K                              V15.81           

                                        OTHER SPECIFIED PERSONAL HISTORY PRESENTING HAZARDS TO HEALTH, NONCOMPLIANCE WITH

 MEDICAL TREATMENT                               

 

                2010              ALYSHA WHITFIELD N                              729.5

                          PAIN IN LIMB                       

 

                2010              ALYSHA WHITFIELD N                              V15.81

                                        OTHER SPECIFIED PERSONAL HISTORY PRESENTING HAZARDS TO HEALTH, NONCOMPLIANCE

 WITH MEDICAL TREATMENT                          

 

                2010              ALYSHA WHITFIELD N                              729.5

                          PAIN IN LIMB                       

 

                2010              ALYSHA WHITFIELD N                              V15.81

                                        OTHER SPECIFIED PERSONAL HISTORY PRESENTING HAZARDS TO HEALTH, NONCOMPLIANCE

 WITH MEDICAL TREATMENT                          

 

                2010              ALYSHA WHITFIELD N                              729.5

                          PAIN IN LIMB                       

 

                2010              ALYSHA WHITFIELD N                              V15.81

                                        OTHER SPECIFIED PERSONAL HISTORY PRESENTING HAZARDS TO HEALTH, NONCOMPLIANCE

 WITH MEDICAL TREATMENT                          

 

                2010              ALYSHA WHITFIELD N                              729.5

                          PAIN IN LIMB                       

 

                2010              ALYSHA WHITFIELD N                              V15.81

                                        OTHER SPECIFIED PERSONAL HISTORY PRESENTING HAZARDS TO HEALTH, NONCOMPLIANCE

 WITH MEDICAL TREATMENT                          

 

                2010              ALYSHA WHITFIELD N                              729.5

                          PAIN IN LIMB                       

 

                2010              ALYSHA WHITFIELD N                              V15.81

                                        OTHER SPECIFIED PERSONAL HISTORY PRESENTING HAZARDS TO HEALTH, NONCOMPLIANCE

 WITH MEDICAL TREATMENT                          

 

           2010                             Ot              250.13                             

         

 

           2010                             Ot              250.63                             

         

 

           2010                             Ot              272.4                              

        

 

           2010                             Ot              311                                

      

 

           2010                             Ot              357.2                              

        

 

           2010                             Ot              427.89                             

         

 

           2010                             Ot              V58.67                             

         

 

           2010                             Ot              V60.2                              

        

 

           2010                             Ot              250.00                             

         

 

           2010                             Ot              276.51                             

         

 

           2010                             Ot              787.03                             

         

 

           2010                             Ot              789.05                             

         

 

           2010                             Ot              V58.67                             

         

 

           2010                             Ot              250.13                             

         

 

           2010                             Ot              250.63                             

         

 

           2010                             Ot              272.4                              

        

 

           2010                             Ot              357.2                              

        

 

           2010                             Ot              V15.81                             

         

 

           2010                             Ot              250.00                             

         

 

           2010                             Ot              686.9                              

        

 

           2010                             Ot              782.1                              

        

 

           2010                             Ot              V15.81                             

         

 

           2010                             Ot              V58.67                             

         

 

           2010                             Ot              V58.69                             

         

 

           2010                             Ot              250.80                             

         

 

           2010                             Ot              276.8                              

        

 

           2010                             Ot              786.50                             

         

 

           2010                             Ot              V58.67                             

         

 

           2010                             Ot              V58.69                             

         

 

           2010                             Ot              785.0                              

        

 

           2010                             Ot              787.02                             

         

 

           2010                             Ot              787.91                             

         

 

           2010                             Ot              789.00                             

         

 

           2010                             Ot              250.00                             

         

 

           2010                             Ot              250.13                             

         

 

           2010                             Ot              850.0                              

        

 

           2010                             Ot              920                                

      

 

           2010                             Ot              959.01                             

         

 

           2010                             Ot              E000.8                             

         

 

           2010                             Ot              E849.0                             

         

 

           2010                             Ot              E888.1                             

         

 

           2010                             Ot              250.00                             

         

 

           2010                             Ot              786.52                             

         

 

           2010                             Ot              786.59                             

         

 

           2010                             Ot              V15.81                             

         

 

           2010                             Ot              V58.67                             

         

 

           2010                             Ot              250.01                             

         

 

           2010                             Ot              V58.67                             

         

 

           2010                             Ot              250.01                             

         

 

           2010                             Ot              780.09                             

         

 

           2010                             Ot              250.13                             

         

 

           2010                             Ot              250.63                             

         

 

           2010                             Ot              357.2                              

        

 

           2010                             Ot              V15.81                             

         

 

           2010                             Ot              V58.67                             

         

 

           2010                             Ot              V58.69                             

         

 

           10/21/2010                             Ot              250.13                             

         

 

           10/21/2010                             Ot              V15.81                             

         

 

           2011                             Ot              250.01                             

         

 

           2011                             Ot              311                                

      

 

           2011                             Ot              V58.67                             

         

 

           2011                             Ot              V62.84                             

         

 

           06/10/2011                             Ot              924.20                             

         

 

           06/10/2011                             Ot              959.7                              

        

 

           06/10/2011                             Ot              E000.8                             

         

 

           06/10/2011                             Ot              E849.0                             

         

 

           06/10/2011                             Ot              E888.9                             

         

 

           2011                             Ot              250.01                             

         

 

           2011                             Ot              493.92                             

         

 

           2011                             Ot              786.05                             

         

 

           2011                             Ot              V58.67                             

         

 

           2011                             Ot              V58.69                             

         

 

           2011                             Ot              250.03                             

         

 

           2011                             Ot              V15.81                             

         

 

           2011                             Ot              V58.67                             

         

 

           2011                             Ot              923.00                             

         

 

           2011                             Ot              959.2                              

        

 

           2011                             Ot              E000.8                             

         

 

           2011                             Ot              E849.0                             

         

 

           2011                             Ot              E888.9                             

         

 

           2011                             Ot              250.00                             

         

 

           2011                             Ot              790.29                             

         

 

           2011                             Ot              250.00                             

         

 

           2011                             Ot              682.7                              

        

 

           2011                             Ot              729.5                              

        

 

           10/05/2011                             Ot              250.13                             

         

 

           10/05/2011                             Ot              311                                

      

 

           10/05/2011                             Ot              V15.81                             

         

 

           10/20/2011                             Ot              041.12                             

         

 

           10/20/2011                             Ot              250.01                             

         

 

           10/20/2011                             Ot              682.2                              

        

 

           10/20/2011                             Ot              785.6                              

        

 

           10/20/2011                             Ot              V58.67                             

         

 

           10/24/2011                             Ot              110.8                              

        

 

           10/24/2011                             Ot              250.13                             

         

 

           10/24/2011                             Ot              V15.81                             

         

 

           10/26/2011                             Ot              250.13                             

         

 

           10/26/2011                             Ot              311                                

      

 

           10/26/2011                             Ot              V15.81                             

         

 

           10/26/2011                             Ot              V58.67                             

         

 

           10/26/2011                             Ot              V62.84                             

         

 

           2011                             Ot              250.03                             

         

 

           2011                             Ot              276.8                              

        

 

           2011                             Ot              305.1                              

        

 

           2011                             Ot              311                                

      

 

           2011                             Ot              493.90                             

         

 

           2011                             Ot              682.6                              

        

 

           2011                             Ot              V15.81                             

         

 

           2011                             Ot              V17.3                              

        

 

             2012                             Ot              790.29              OTHER ABNORMAL

 GLUCOSE                                         

 

             08/15/2012                             Ot              780.79              OTH MALAISE FATIGUE

                                                 

 

             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

                                                 

 

             2012                             Ot              786.50              CHEST PAIN NOS

                                                 

 

             2012                             Ot              786.52              PAINFUL RESPIRATION

                                                 

 

             2012                             Ot              250.02              DIAB BEBETO WO 

COMPL, TYPE II OR UNSPEC TY                       

 

             2012                             Ot              466.0              ACUTE BRONCHITIS

                                                 

 

             2012                             Ot              493.90              ASTHMA, UNSPECIFIED

                                                 

 

             2012                             Ot              V60.0              LACK OF HOUSING

                                                 

 

             2012                             Ot              250.02              DIAB BEBETO WO 

COMPL, TYPE II OR UNSPEC TY                       

 

             2012                             Ot              300.00              ANXIETY STATE

 NOS                                             

 

             2012                             Ot              305.1              TOBACCO USE DISORDER

                                                 

 

             2012                             Ot              311              DEPRESSIVE DISORDER

 NEC                                             

 

             2012                             Ot              493.90              ASTHMA, UNSPECIFIED

                                                 

 

             2012                             Ot              780.2              SYNCOPE AND COLLAPSE

                                                 

 

             2012                             Ot              780.39              OTHER CONVULSIONS

                                                 

 

             2012                             Ot              V15.81              HX OF PAST NONCOMPLIANCE

                                                 

 

             2012                             Ot              250.13              DIAB W KETOACIDOSIS,

 TYPE I [JUVENILE TY                             

 

             2012                             Ot              272.4              HYPERLIPIDEMIA

 NEC/NOS                                         

 

             2012                             Ot              276.1              HYPOSMOLALITY 

                                                 

 

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         

                          LONG TERM (CURRENT) USE OF INSULIN                       

 

             2018              ATUL RILEYIS              Ot              E10.42              

TYPE 1 DIABETES MELLITUS WITH DIABETIC P                       

 

             2018              BERNSILVER ANDI              Ot              F12.10              

CANNABIS ABUSE, UNCOMPLICATED                       

 

             2018              BERNSILVER ANDI              Ot              F31.9              BIPOLAR

 DISORDER, UNSPECIFIED                           

 

             2018              BERNOT ANDI              Ot              F41.9              ANXIETY

 DISORDER, UNSPECIFIED                           

 

                2018              BERNSILVER ANDI              Ot              J45.909           

                          UNSPECIFIED ASTHMA, UNCOMPLICATED                       

 

             2018              OSVALDO NADI              Ot              K21.9              GASTRO-

ESOPHAGEAL REFLUX DISEASE WITHOUT                       

 

                2018              OSVALDO ANDI              Ot              L02.413           

                          CUTANEOUS ABSCESS OF RIGHT UPPER LIMB                       

 

                2018              OSVALDO ANDI              Ot              L03.113           

                          CELLULITIS OF RIGHT UPPER LIMB                       

 

             2018              BERNSILVER ANDI              Ot              R59.0              LOCALIZED

 ENLARGED LYMPH NODES                            

 

             2018              OSVALDO ANDI              Ot              Z77.22              

CNTCT W AND EXPSR TO ENVIRON TOBACCO SMO                       

 

             2018              OSVALDO NADI              Ot              Z79.01              

LONG TERM (CURRENT) USE OF ANTICOAGULANT                       

 

             2018              OSVALDO ANDI              Ot              Z79.4              LONG

 TERM (CURRENT) USE OF INSULIN                       

 

             2018              OSVALDO ANDI              Ot              Z80.8              FAMILY

 HISTORY OF MALIGNANT NEOPLASM OF                        

 

             2018              OSVALDO ANDI              Ot              Z87.19              

PERSONAL HISTORY OF OTHER DISEASES OF TH                       

 

             2018              ATUL RILEYIS              Ot              Z88.8              ALLERGY

 STATUS TO OTH DRUG/MEDS/BIOL SUB                       

 

             09/10/2018              ATUL RILEYIS              Ot              E10.42              

TYPE 1 DIABETES MELLITUS WITH DIABETIC P                       

 

             09/10/2018              OSVALDO ANDI              Ot              F12.10              

CANNABIS ABUSE, UNCOMPLICATED                       

 

             09/10/2018              BERNSILVER ANDI              Ot              F31.9              BIPOLAR

 DISORDER, UNSPECIFIED                           

 

             09/10/2018              OSVALDO ANDI              Ot              F41.9              ANXIETY

 DISORDER, UNSPECIFIED                           

 

                09/10/2018              ANDI RILEY              Ot              J45.909           

                          UNSPECIFIED ASTHMA, UNCOMPLICATED                       

 

             09/10/2018              BERNATUL SHEPPARDIS              Ot              K21.9              GASTRO-

ESOPHAGEAL REFLUX DISEASE WITHOUT                       

 

                09/10/2018              ATUL RILEYIS              Ot              L02.413           

                          CUTANEOUS ABSCESS OF RIGHT UPPER LIMB                       

 

                09/10/2018              ATUL RILEYIS              Ot              L03.113           

                          CELLULITIS OF RIGHT UPPER LIMB                       

 

             09/10/2018              ATUL RILEYIS              Ot              R59.0              LOCALIZED

 ENLARGED LYMPH NODES                            

 

             09/10/2018              BERNATUL SHEPPARDIS              Ot              Z77.22              

CNTCT W AND EXPSR TO ENVIRON TOBACCO SMO                       

 

             09/10/2018              BERNATUL SHEPPARDIS              Ot              Z79.01              

LONG TERM (CURRENT) USE OF ANTICOAGULANT                       

 

             09/10/2018              BERNSILVER ANDI              Ot              Z79.4              LONG

 TERM (CURRENT) USE OF INSULIN                       

 

             09/10/2018              ATUL RILEYIS              Ot              Z80.8              FAMILY

 HISTORY OF MALIGNANT NEOPLASM OF                        

 

             09/10/2018              BERNATUL SHEPPARDIS              Ot              Z87.19              

PERSONAL HISTORY OF OTHER DISEASES OF                        

 

             09/10/2018              ATUL RILEYIS              Ot              Z88.8              ALLERGY

 STATUS TO OTH DRUG/MEDS/BIOL SUB                       



                                                                                
                                                                                
                                                                                
                                                                                
                                                                                
                                                                                
                                                                                
                                                                                
                                      



Procedures

      





                Code              Description              Performed By              Performed On     

   

 

                                      07899                                    ROUTINE VENIPUNCTURE         

                                                    2013        

 

                                      18446                                    A1C (IN-HOUSE)               

                                                    2013        

 

                                55432                                    CMP                            

                                        2013        

 

                                      [id number]                                    GFR CALC (RESULT ONLY)     

                                                    2013        

 

                                      06818                                    LIPID PANEL                  

                                                    2013        

 

                                60867                                    CBC                            

                                        2013        

 

                                      24143                                    GLUCOSE FINGER STICK         

                                                    2013        

 

                                      15546                                    GLUCOSE FINGER STICK         

                                                    10/29/2013        

 

                                      59080                                    A1C (IN-HOUSE)               

                                                    10/29/2013        

 

                                      87314                                    ROUTINE VENIPUNCTURE         

                                                    10/29/2013        

 

                                      60224                                    LIPID PANEL                  

                                                    10/29/2013        

 

                                      [id number]                                    GFR CALC (RESULT ONLY)     

                                                    10/29/2013        

 

                                [id number]                                    CMP                            

                                        10/29/2013        

 

                                00217                                    CBC                            

                                        10/29/2013        

 

                                      58824                                    URINE DRUG SCREEN  (IN-HOUSE)

                                                    2014        

 

                                      50868                                    A1C (IN-HOUSE)               

                                                    2014        

 

                                      [employer]                                    Dianne Polanco              

                                                    10/21/2014        

 

                                      32971                                    A1C (IN-HOUSE)               

                                                    10/21/2014        

 

                                      [id number]                                    REPOSITION LEFT UPPER FEMUR

 WITH INTRAME                                                  2017        



                                                      



Results

      





                    Test                Result              Range        









                                        Complete blood count (CBC) with automated white blood cell (WBC) differential - 

17 01:23         









                          Blood leukocytes automated count (number/volume)              14.6 10*3/uL        

                                        4.3-11.0        

 

                          Blood erythrocytes automated count (number/volume)              4.25 10*6/uL      

                                        4.35-5.85        

 

                          Venous blood hemoglobin measurement (mass/volume)              13.5 g/dL          

                                        13.3-17.7        

 

                    Blood hematocrit (volume fraction)              39 %                40-54        

 

                    Automated erythrocyte mean corpuscular volume              91 [foz_us]              

80-99        

 

                                        Automated erythrocyte mean corpuscular hemoglobin (mass per erythrocyte)        

                          32 pg                     25-34        

 

                                        Automated erythrocyte mean corpuscular hemoglobin concentration measurement (mass/volume)

                          35 g/dL                   32-36        

 

                    Automated erythrocyte distribution width ratio              11.9 %              10.0-

14.5        

 

                    Automated blood platelet count (count/volume)              387 10*3/uL              

130-400        

 

                          Automated blood platelet mean volume measurement              9.4 [foz_us]        

                                        7.4-10.4        

 

                    Automated blood neutrophils/100 leukocytes              74 %                42-75     

   

 

                    Automated blood lymphocytes/100 leukocytes              17 %                12-44     

   

 

                    Blood monocytes/100 leukocytes              6 %                 0-12        

 

                    Automated blood eosinophils/100 leukocytes              3 %                 0-10       

 

 

                    Automated blood basophils/100 leukocytes              0 %                 0-10        

 

                          Blood neutrophils automated count (number/volume)              10.9 10*3          

                                        1.8-7.8        

 

                          Blood lymphocytes automated count (number/volume)              2.5 10*3           

                                        1.0-4.0        

 

                    Blood monocytes automated count (number/volume)              0.8 10*3              0.0-

1.0        

 

                    Automated eosinophil count              0.4 10*3/uL              0.0-0.3        

 

                    Automated blood basophil count (count/volume)              0.0 10*3/uL              

0.0-0.1        









                                        Comprehensive metabolic panel - 17 01:23         









                          Serum or plasma sodium measurement (moles/volume)              140 mmol/L         

                                        135-145        

 

                          Serum or plasma potassium measurement (moles/volume)              3.6 mmol/L      

                                        3.6-5.0        

 

                          Serum or plasma chloride measurement (moles/volume)              102 mmol/L       

                                                

 

                    Carbon dioxide              22 mmol/L              21-32        

 

                          Serum or plasma anion gap determination (moles/volume)              16 mmol/L     

                                        5-14        

 

                          Serum or plasma urea nitrogen measurement (mass/volume)              7 mg/dL      

                                        7-18        

 

                          Serum or plasma creatinine measurement (mass/volume)              0.95 mg/dL      

                                        0.60-1.30        

 

                    Serum or plasma urea nitrogen/creatinine mass ratio              7                   NRG

        

 

                                        Serum or plasma creatinine measurement with calculation of estimated glomerular 

filtration rate              >                         NRG        

 

                          Serum or plasma glucose measurement (mass/volume)              230 mg/dL          

                                                

 

                          Serum or plasma calcium measurement (mass/volume)              9.4 mg/dL          

                                        8.5-10.1        

 

                          Serum or plasma total bilirubin measurement (mass/volume)              0.4 mg/dL  

                                        0.1-1.0        

 

                                        Serum or plasma alkaline phosphatase measurement (enzymatic activity/volume)    

                          127 U/L                           

 

                                        Serum or plasma aspartate aminotransferase measurement (enzymatic activity/volume)

                          18 U/L                    5-34        

 

                                        Serum or plasma alanine aminotransferase measurement (enzymatic activity/volume)

                          22 U/L                    0-55        

 

                          Serum or plasma protein measurement (mass/volume)              6.3 g/dL           

                                        6.4-8.2        

 

                          Serum or plasma albumin measurement (mass/volume)              3.9 g/dL           

                                        3.2-4.5        









                                        Serum or plasma ethanol measurement (mass/volume) - 17 01:23         









                    Serum or plasma ethanol measurement (mass/volume)              < mg/dL              

<10        









                                        Capillary blood glucose measurement by glucometer (mass/volume) - 17 05:22

         









                          Capillary blood glucose measurement by glucometer (mass/volume)              173 mg/dL

                                                









                                        Methicillin resistant Staphylococcus aureus (MRSA) screening culture - 17 

09:34         









                          Methicillin resistant Staphylococcus aureus (MRSA) screening culture              

NEG                                     NRG        









                                        Capillary blood glucose measurement by glucometer (mass/volume) - 17 10:12

         









                          Capillary blood glucose measurement by glucometer (mass/volume)              416 mg/dL

                                                









                                        Capillary blood glucose measurement by glucometer (mass/volume) - 17 10:40

         









                          Capillary blood glucose measurement by glucometer (mass/volume)              400 mg/dL

                                                









                                        Capillary blood glucose measurement by glucometer (mass/volume) - 17 11:11

         









                          Capillary blood glucose measurement by glucometer (mass/volume)              348 mg/dL

                                                









                                        Capillary blood glucose measurement by glucometer (mass/volume) - 17 11:21

         









                          Capillary blood glucose measurement by glucometer (mass/volume)              344 mg/dL

                                                









                                        Capillary blood glucose measurement by glucometer (mass/volume) - 17 13:18

         









                          Capillary blood glucose measurement by glucometer (mass/volume)              158 mg/dL

                                                









                                        Capillary blood glucose measurement by glucometer (mass/volume) - 17 14:22

         









                          Capillary blood glucose measurement by glucometer (mass/volume)              117 mg/dL

                                                









                                        PT panel in platelet poor plasma by coagulation assay - 17 15:14         









                          Prothrombin time (PT) in platelet poor plasma by coagulation assay              13.1

 s                                      12.2-14.7        

 

                          INR in platelet poor plasma or blood by coagulation assay              1.0        

                                        0.8-1.4        









                                        Capillary blood glucose measurement by glucometer (mass/volume) - 17 17:42

         









                          Capillary blood glucose measurement by glucometer (mass/volume)              84 mg/dL

                                                









                                        Capillary blood glucose measurement by glucometer (mass/volume) - 17 23:38

         









                          Capillary blood glucose measurement by glucometer (mass/volume)              350 mg/dL

                                                









                                        Capillary blood glucose measurement by glucometer (mass/volume) - 17 05:31

         









                          Capillary blood glucose measurement by glucometer (mass/volume)              132 mg/dL

                                                









                                        PT panel in platelet poor plasma by coagulation assay - 17 06:00         









                          Prothrombin time (PT) in platelet poor plasma by coagulation assay              13.7

 s                                      12.2-14.7        

 

                          INR in platelet poor plasma or blood by coagulation assay              1.1        

                                        0.8-1.4        









                                        Hemoglobin A1c - 17 06:00         









                    Hemoglobin A1c              9.4 %               4.5-6.2        









                                        Capillary blood glucose measurement by glucometer (mass/volume) - 17 11:53

         









                          Capillary blood glucose measurement by glucometer (mass/volume)              390 mg/dL

                                                









                                        Capillary blood glucose measurement by glucometer (mass/volume) - 17 19:06

         









                          Capillary blood glucose measurement by glucometer (mass/volume)              274 mg/dL

                                                









                                        Capillary blood glucose measurement by glucometer (mass/volume) - 17 23:48

         









                          Capillary blood glucose measurement by glucometer (mass/volume)              188 mg/dL

                                                









                                        Complete blood count (CBC) with automated white blood cell (WBC) differential - 

17 04:20         









                          Blood leukocytes automated count (number/volume)              9.7 10*3/uL         

                                        4.3-11.0        

 

                          Blood erythrocytes automated count (number/volume)              3.50 10*6/uL      

                                        4.35-5.85        

 

                          Venous blood hemoglobin measurement (mass/volume)              11.0 g/dL          

                                        13.3-17.7        

 

                    Blood hematocrit (volume fraction)              33 %                40-54        

 

                    Automated erythrocyte mean corpuscular volume              94 [foz_us]              

80-99        

 

                                        Automated erythrocyte mean corpuscular hemoglobin (mass per erythrocyte)        

                          31 pg                     25-34        

 

                                        Automated erythrocyte mean corpuscular hemoglobin concentration measurement (mass/volume)

                          34 g/dL                   32-36        

 

                    Automated erythrocyte distribution width ratio              11.8 %              10.0-

14.5        

 

                    Automated blood platelet count (count/volume)              239 10*3/uL              

130-400        

 

                          Automated blood platelet mean volume measurement              10.2 [foz_us]       

                                        7.4-10.4        

 

                    Automated blood neutrophils/100 leukocytes              49 %                42-75     

   

 

                    Automated blood lymphocytes/100 leukocytes              35 %                12-44     

   

 

                    Blood monocytes/100 leukocytes              10 %                0-12        

 

                    Automated blood eosinophils/100 leukocytes              6 %                 0-10       

 

 

                    Automated blood basophils/100 leukocytes              0 %                 0-10        

 

                          Blood neutrophils automated count (number/volume)              4.7 10*3           

                                        1.8-7.8        

 

                          Blood lymphocytes automated count (number/volume)              3.4 10*3           

                                        1.0-4.0        

 

                    Blood monocytes automated count (number/volume)              0.9 10*3              0.0-

1.0        

 

                    Automated eosinophil count              0.6 10*3/uL              0.0-0.3        

 

                    Automated blood basophil count (count/volume)              0.0 10*3/uL              

0.0-0.1        









                                        PT panel in platelet poor plasma by coagulation assay - 17 04:20         









                          Prothrombin time (PT) in platelet poor plasma by coagulation assay              14.7

 s                                      12.2-14.7        

 

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                              [facility]                                  

 

             [id number]              10/19/2018 10:00:00                                            Document

 Registration                                                                       

 

                [id number]              2018 08:36:12              2018 23:59:59              CLS

              Outpatient                                                                   

 

 

             [id number]              2015 15:23:51                             ACT              Document

 Registration

## 2019-07-03 NOTE — XMS REPORT
[facility]

                             Created on: 2018



Dat Ruiz

External Reference #: [id number]

: 1990

Sex: Male



Demographics







                          Address                   [address] [place]  [address]

 

                          Preferred Language        Unknown

 

                          Marital Status            Unknown

 

                          Christianity Affiliation     Unknown

 

                          Race                      Unknown

 

                          Ethnic Group              Unknown





Author







                          Author                    VERONICA MENDES

 

                          Organization              [facility]

 

                          Address                   [address] [place]  [address]



 

                          Phone                     [phone]







Care Team Providers







                    Care Team Member Name    Role                Phone

 

                    VERONICA MENDES     Unavailable         [phone]







PROBLEMS







          Type      Condition    ICD9-CM Code    FIP57-OX Code    Onset Dates    Condition Status    SNOMED

 Code

 

             Problem      Erectile dysfunction due to diseases classified elsewhere                 N52.1          

                          Active                    [id number]

 

           Problem    Cigarette nicotine dependence without complication               F17.210               Active

                                        [id number]

 

                Problem         Diabetic polyneuropathy associated with type 1 diabetes mellitus                    E10.42

                                        Active              [id number]

 

          Problem    Diabetes              E11.9               Active    [id number]







ALLERGIES

No Information



ENCOUNTERS







                Encounter       Location        Date            Diagnosis

 

                          [place] [id number] [place] [address]

                                         

 

                          [facility]     [id number] [place] [address]

                                         

 

                          [facility]     [id number] [place] [address]

                          20 Dec, 2017               

 

                          [facility]     [id number] [place] [address]

                          05 Dec, 2017               

 

                          [facility]     [id number] [place] [address]

                                         

 

                          [facility]     [id number] [place] [address]

                                         

 

                          [facility]     [id number] [place] [address]

                                        Diabetes E11.9

 

                          [facility]     [id number] [place] [address]

                          24 Aug, 2017              Type 1 diabetes mellitus with other neurologic complication E10.49



 

                          [facility]     [id number] [place] [address]

                          24 Aug, 2017               

 

                          [facility]     [id number] [place] [address]

                          04 Aug, 2017              Diabetes E11.9 ; Erectile dysfunction due to diseases classified 

elsewhere N52.1 ; Diabetic polyneuropathy associated with type 1 diabetes 
mellitus E10.42 and Cigarette nicotine dependence without complication F17.210

 

                          [facility]     [id number] [place] [address]

                                        Hip fracture, left, closed, with routine healing, subsequent encounter

 S72.002D and Allergy, initial encounter T78.40XA

 

                          [facility]     [address] [address]

                                         

 

                          [facility]     [id number] [place] [address]

                          30 May, 2017              Femur fracture, left S72.92XA

 

                          [facility]     [address] [address]

                          30 May, 2017              Femur fracture, left S72.92XA

 

                          [facility]     [id number] [place] [address]

                          16 May, 2017              Diabetes E11.9 and Femur fracture, left S72.92XA

 

                          [facility]    [id number] [place] [phone]

                          09 May, 2017               

 

                          [facility]    [id number] [place] [address]

                          31 May, 2016               

 

                          [facility]     [id number] [place] [address]

                          31 May, 2016               

 

                          [facility]     [id number] [place] [address]

                                         

 

                          [facility]     [id number] [place] [address]

                                         

 

                          [facility]     [id number] [place] [address]

                          24 Mar, 2015               

 

                          [facility] [place] [facility]     [address] [id number] [place] [address]

                          24 Mar,                

 

                          [facility] [place] [facility]     [address] [id number] [place] [address]

                          04 Mar,                

 

                          [facility] [place] [facility]     [address] [id number] [place] [address]

                          04 Mar,                

 

                          [facility] [place] [facility]     [address] [id number] [place] [address]

                          04 Mar,                

 

                          [facility] [place] [facility]     [address] [id number] [place] [address]

                          04 Mar,                

 

                          [facility] [place] [facility]     [address] [id number] [place] [address]

                          30 Dec, 2014               

 

                          [facility] [place] [facility]     [address] [id number] [place] [address]

                          30 Dec, 2014               

 

                          [facility] [place] [facility]     [address] [id number] [place] [address]

                          18 Dec, 2014               

 

                          [facility] [place] [facility]     [address] [id number] [place] [address]

                          18 Dec, 2014               

 

                          [facility] [place] [facility]     [address] [id number] [place] [address]

                          15 Dec, 2014               

 

                          [facility] [place] [facility]     [address] [id number] [place] [address]

                          12 Dec, 2014               

 

                          [facility] [place] [facility]     [address] [id number] [place] [address]

                          12 Dec, 2014               

 

                          [facility] [place] [facility]     [address] [id number] [place] [address]

                          24 Oct, 2014               

 

                          [facility] [place] [facility]     [address] [id number] [place] [address]

                          24 Oct, 2014               

 

                          [facility] [place] [facility]     [address] [id number] [place] [address]

                          21 Oct, 2014               

 

                          [facility] [place] [facility]     [address] [id number] [place] [address]

                          21 Oct, 2014               

 

                          [facility] [place] [facility]     [address] [id number] [place] [address]

                          16 Sep, 2014               

 

                          [facility] [place] [facility]     [address] [id number] [place] [address]

                          16 Sep, 2014               

 

                          [facility] [place] [facility]     [address] [id number] [place] [address]

                          20 2014               

 

                          [facility] [place] [facility]     [address] [id number] [place] [address]

                          20 2014               

 

                          [facility] [place] [facility]     [address] [id number] [place] [address]

                          17 2014               

 

                          [facility] [place] [facility]     [address] [id number] [place] [address]

                          17 2014               

 

                          [facility] [place] [facility]     [address] [id number] [place] [address]

                          10 [place], 2014               

 

                          [facility] [place] [facility]     [address] [id number] [place] [address]

                          10 [place], 2014               

 

                          [facility] [place] [facility]     [address] [id number] [place] [address]

                                         

 

                          [facility] [place] [facility]     [address] [id number] [place] [address]

                                         

 

                          [facility] [place] [facility]     [address] [id number] [address]

                          15 2013               

 

                          [facility] [place] [facility]     [address] [id number], [place] [address]

                          14 2013               

 

                          [facility] [place] [facility]     [address] [id number] [place] [address]

                          14 2013               

 

                          [facility] [place] [facility]     [address] [id number] [place] [address]

                          12 2013               

 

                          [facility] [place] [facility]     [address] [id number] [place] [address]

                                         

 

                          [facility] [place] [facility]     [address] [id number] [place] [address]

                                         

 

                          [facility]     [address] [id number] [place] [address]

                                         

 

                          [facility]     [address] [id number] [place] [address]

                          29 Oct, 2013               

 

                          [facility]     [address] [id number] [place] [address]

                          29 Oct, 2013               

 

                          [facility]     [address] [id number] [place] [address]

                          13 Aug, 2013               

 

                          [facility]     [address] [id number] [place] [address]

                          23 May, 2013               

 

                          [facility]     [address] [id number] [place] [address]

                                         

 

                          [facility]     [address] [id number] [place] [address]

                                         

 

                          [facility]     [address] [id number] [place] [address]

                          12 Mar, 2013               

 

                          [facility]     [address] [id number] [place] [address]

                          05 Mar, 2013               

 

                          [facility]     [address] [facility] [id number] [place] [address]

                                         

 

                          [facility]     [address] [id number] [place] [address]

                                         

 

                          [facility]     [address] [id number] [place] [address]

                                         

 

                          [facility]     [address] [id number] [place] [address]

                          18 Dec, 2009               

 

                          [facility]     [address] [id number] [place] [address]

                                         

 

                          [facility]     [address] [id number] [place] [address]

                          26 Oct, 2009               

 

                          [facility]     [address] [facility] [id number] [place] [address]

                          23 Oct, 2009               

 

                          [facility]     [address] [facility] [id number] [place] [address]

                          14 Sep, 2009               







IMMUNIZATIONS

No Known Immunizations



SOCIAL HISTORY

Never Assessed



REASON FOR VISIT

insulin pump



PLAN OF CARE





VITAL SIGNS





MEDICATIONS

Unknown Medications



RESULTS

No Results



PROCEDURES

No Known procedures



INSTRUCTIONS





MEDICATIONS ADMINISTERED

No Known Medications



MEDICAL (GENERAL) HISTORY







                    Type                Description         Date

 

                    Medical History     Diabetes             

 

                    Surgical History    Left hip surgery    2017

 

                    Surgical History    tonsilectomy         

 

                    H[facility]ization History     [facility]

## 2019-07-03 NOTE — XMS REPORT
[facility]

                             Created on: 2018



Dat Ruiz

External Reference #: [id number]

: 1990

Sex: Male



Demographics







                          Address                   [address] [place]  [address]

 

                          Preferred Language        Unknown

 

                          Marital Status            Unknown

 

                          Mu-ism Affiliation     Unknown

 

                          Race                      Unknown

 

                          Ethnic Group              Unknown





Author







                          Author                    VERONICA MENDES

 

                          [facility]

 

                          Address                   [address] [place]  [address]



 

                          Phone                     [phone]







Care Team Providers







                    Care Team Member Name    Role                Phone

 

                    VERONICA MENDES     Unavailable         [phone]







PROBLEMS







          Type      Condition    ICD9-CM Code    ARN68-ED Code    Onset Dates    Condition Status    SNOMED

 Code

 

             Problem      Erectile dysfunction due to diseases classified elsewhere                 N52.1          

                          Active                    892697217

 

           Problem    Cigarette nicotine dependence without complication               F17.210               Active

                                        [id number]

 

                Problem         Diabetic polyneuropathy associated with type 1 diabetes mellitus                    E10.42

                                        Active              34801068

 

          Problem    Diabetes              E11.9               Active    906966216







ALLERGIES

No Information



ENCOUNTERS







                Encounter       Location        Date            Diagnosis

 

                          [facility]     [id number] [place] [address]

                                         

 

                          [facility]     [id number] [place] [address]

                                        Diabetes E11.9

 

                          [facility]     [id number] [place] [address]

                                         

 

                          [facility]     [id number] [place] [address]

                          14 May, 2018               

 

                          [facility]     [id number] [place] [address]

                                         

 

                          [facility]     [id number] [place] [address]

                                         

 

                          [facility]     [id number] [place] [address]

                          20 Dec, 2017               

 

                          [facility]     [id number] [place] [address]

                          05 Dec, 2017               

 

                          [facility]     [id number] [place] [address]

                                         

 

                          [facility]     [id number] [place] [address]

                                         

 

                          [facility]     [id number] [place] [address]

                                        Diabetes E11.9

 

                          [place] [id number] [place] [address]

                          24 Aug, 2017              Type 1 diabetes mellitus with other neurologic complication E10.49



 

                          [facility]     [id number] [place] [address]

                          24 Aug, 2017               

 

                          [address] [address] [address]

                          04 Aug, 2017              Diabetes E11.9 ; Erectile dysfunction due to diseases classified 

elsewhere N52.1 ; Diabetic polyneuropathy associated with type 1 diabetes 
mellitus E10.42 and Cigarette nicotine dependence without complication F17.210

 

                          [place] [id number] [place] [address]

                                        Hip fracture, left, closed, with routine healing, subsequent encounter

 S72.002D and Allergy, initial encounter T78.40XA

 

                          [place] [id number] [place] [address]

                                         

 

                          [place] [id number] [place] [address]

                          30 May, 2017              Femur fracture, left S72.92XA

 

                          [place] [id number] [place] [address]

                          30 May, 2017              Femur fracture, left S72.92XA

 

                          [place] [id number] [place] [address]

                          16 May, 2017              Diabetes E11.9 and Femur fracture, left S72.92XA

 

                          [facility]    [id number] [place] [phone]

                          09 May, 2017               

 

                          [facility]    [id number] [place] [address]

                          31 May, 2016               

 

                          [facility] [place] [facility]     [address] [id number] [place] [address]

                          31 May, 2016               

 

                          [facility] [place] [facility]     [address] [id number] [place] [address]

                          14 2015               

 

                          [facility] [place] [facility]     [address] [id number] [place] [address]

                                         

 

                          [facility] [place] [facility]     [address] [id number] [place] [address]

                          24 Mar, 2015               

 

                          [facility] [place] [facility]     [address] [id number] [place] [address]

                          24 Mar, 2015               

 

                          [facility] [place] [facility]     [address] [id number] [place] [address]

                          04 Mar, 2015               

 

                          [facility] [place] [facility]     [address] [id number] [place] [address]

                          04 Mar, 2015               

 

                          [facility] [place] [facility]     [address] [id number] [place] [address]

                          04 Mar, 2015               

 

                          [facility] [place] [facility]     [address] [id number] [place] [address]

                          04 Mar, 2015               

 

                          [facility] [place] [facility]     [address] [id number] [place] [address]

                          30 Dec, 2014               

 

                          [facility] [place] [facility]     [address] [id number] [place] [address]

                          30 Dec, 2014               

 

                          [facility] [place] [facility]     [address] [id number] [place] [address]

                          18 Dec, 2014               

 

                          [facility] [place] [facility]     [address] [id number] [place] [address]

                          18 Dec, 2014               

 

                          [facility] [place] [facility]     [address] [id number] [place] [address]

                          15 Dec, 2014               

 

                          [facility] [place] [facility]     [address] [id number] [place] [address]

                          12 Dec, 2014               

 

                          [facility] [place] [facility]     [address] [id number] [address]

                          12 Dec, 2014               

 

                          [facility] [place] [facility]     [address] [id number] [place] [address]

                          24 Oct, 2014               

 

                          [facility] [place] [facility]     [address] [id number] [place] [address]

                          24 Oct, 2014               

 

                          [facility] [place] [facility]     [address] [id number] [place] [address]

                          21 Oct, 2014               

 

                          [facility] [place] [facility]     [address] [id number] [place] [address]

                          21 Oct, 2014               

 

                          [facility] [place] [facility]     [address] [id number] [place] [address]

                          16 Sep, 2014               

 

                          [facility] [place] [facility]     [address] [id number] [place] [address]

                          16 Sep, 2014               

 

                          [facility] [place] [facility]     [address] [id number] [place] [address]

                                         

 

                          [facility] [place] [facility]     [address] [id number] [place] [address]

                                         

 

                          [facility] [place] [facility]     [address] [facility] [id number] [place] [address]

                                         

 

                          [facility] [place] [facility]     [address] [facility] [id number] [place] [address]

                                         

 

                          [facility] [place] [facility]     [address] [id number] [place] [address]

                          10 [place], 2014               

 

                          [facility] [place] [facility]     [address] [id number] [place] [address]

                          10 [place], 2014               

 

                          [facility] [place] [facility]     [address] [id number] [place] [address]

                                         

 

                          [facility] [place] [facility]     [address] [id number] [place] [address]

                                         

 

                          [facility] [place] [facility]     [address] [facility] [id number] [place] [address]

                          15 2013               

 

                          [facility] [place] [facility]     [address] [id number] [place] [address]

                          14 2013               

 

                          [facility]     [address] [id number] [place] [address]

                          14 2013               

 

                          [facility] [place] [facility]     [address] [id number] [place] [address]

                                         

 

                          [facility] [place] [facility]     [address] [id number] [place] [address]

                                         

 

                          [facility] [place] [facility]     [address] [id number] [place] [address]

                                         

 

                          [facility] [place] [facility]     [address] [id number] [place] [address]

                                         

 

                          [facility] [place] [facility]     [address] [id number] [place] [address]

                          29 Oct, 2013               

 

                          [facility] [place] [facility]     [address] [id number] [place] [address]

                          29 Oct, 2013               

 

                          [facility] [place] [facility]     [address] [id number] [place] [address]

                          13 Aug, 2013               

 

                          [facility] [place] [facility]     [address] [id number] [place] [address]

                          23 May, 2013               

 

                          [facility] [place] [facility]     [address] [id number] [place] [address]

                                         

 

                          [facility] [place] [facility]     [address] [id number] [place] [address]

                                         

 

                          [facility] [place] [facility]     [address] [id number] [place] [address]

                          12 Mar, 2013               

 

                          [facility] [place] [facility]     [address] [id number] [place] [address]

                          05 Mar, 2013               

 

                          [facility] [place] [facility]     [address] [id number] [place] [address]

                          16 2012               

 

                          [facility] [place] [facility]     [address] [id number] [place] [address]

                                         

 

                          [facility]     [id number] [address]

                                         

 

                          [facility]     [id number] [address]

                          18 Dec, 2009               

 

                          [facility]     [id number] [address]

                                         

 

                          [facility]     [id number] [address]

                          26 Oct, 2009               

 

                          [facility]     [id number] [address]

                          23 Oct, 2009               

 

                          [facility]     [id number] [address]

                          14 Sep, 2009               







IMMUNIZATIONS

No Known Immunizations



SOCIAL HISTORY

Never Assessed



REASON FOR VISIT

Upload request



PLAN OF CARE





VITAL SIGNS





MEDICATIONS

Unknown Medications



RESULTS

No Results



PROCEDURES

No Known procedures



INSTRUCTIONS





MEDICATIONS ADMINISTERED

No Known Medications



MEDICAL (GENERAL) HISTORY







                    Type                Description         Date

 

                    Medical History     Diabetes             

 

                    Surgical History    Left hip surgery    2017

 

                    Surgical History    tonsilectomy         

 

                    Hospitalization History     [facility]

## 2019-07-03 NOTE — XMS REPORT
[facility]

                             Created on: 2017



Dat Ruiz

External Reference #: [id number]

: 1990

Sex: Male



Demographics







                          Address                   [address] [place]  [address]

 

                          Preferred Language        Unknown

 

                          Marital Status            Unknown

 

                          [place]ze Affiliation     Unknown

 

                          Race                      Unknown

 

                          Ethnic Group              Unknown





Author







                          Author                    LLUVIA GA

 

                          Organization              [facility]

 

                          Address                   [address] [place]  [address]



 

                          Phone                     [phone]







Care Team Providers







                    Care Team Member Name    Role                Phone

 

                    LLUVIA GA       Unavailable         [phone]







PROBLEMS







          Type      Condition    ICD9-CM Code    ZMQ74-ZA Code    Onset Dates    Condition Status    SNOMED

 Code

 

             Problem      Erectile dysfunction due to diseases classified elsewhere                 N52.1          

                          Active                    308277040

 

           Problem    Cigarette nicotine dependence without complication               F17.210               Active

                                        85633216

 

                Problem         Diabetic polyneuropathy associated with type 1 diabetes mellitus                    E10.42

                                        Active              79256810

 

          Problem    Diabetes              E11.9               Active    784523343







ALLERGIES

No Information



SOCIAL HISTORY

Never Assessed



PLAN OF CARE





VITAL SIGNS





MEDICATIONS







        Medication    Instructions    Dosage    Frequency    Start Date    End Date    Duration    Status



 

        Coumadin 5 mg    Orally Once a day    1 tablet    24h                             Active

 

                    Novolin R 100 UNIT/ML    Injection 3 times a day    10-15 units based on carb counting

             8h                                                  Active

 

        Bisacodyl 5 MG    Orally Once a day    1 tablet as needed    24h                             Active

 

                Hydrocodone-Acetaminophen 5-325 MG    Orally every 6 hrs    1-2 tablet as needed    6h

                                                                Active







RESULTS

No Results



PROCEDURES

No Known procedures



IMMUNIZATIONS

No Known Immunizations



MEDICAL (GENERAL) HISTORY







                    Type                Description         Date

 

                    Medical History     Diabetes             

 

                    Surgical History    Left hip surgery    

 

                    Surgical History    tonsilectomy         

 

                    Hospitalization History     [facility]

## 2019-07-03 NOTE — XMS REPORT
[facility]

                             Created on: 06/10/2018



Dat Ruiz

External Reference #: [id number]

: 1990

Sex: Male



Demographics







                          Address                   [address] [place]  [address]

 

                          Preferred Language        Unknown

 

                          Marital Status            Unknown

 

                          Adventist Affiliation     Unknown

 

                          Race                      Unknown

 

                          Ethnic Group              Unknown





Author







                          Author                    VERONICA MENDES

 

                          Organization              [facility]

 

                          Address                   [address] [place]  [address]



 

                          Phone                     [phone]







Care Team Providers







                    Care Team Member Name    Role                Phone

 

                    VERONICA MENDES     Unavailable         [phone]







PROBLEMS







          Type      Condition    ICD9-CM Code    JJU84-LE Code    Onset Dates    Condition Status    SNOMED

 Code

 

             Problem      Erectile dysfunction due to diseases classified elsewhere                 N52.1          

                          Active                    [id number]

 

           Problem    Cigarette nicotine dependence without complication               F17.210               Active

                                        [id number]

 

                Problem         Diabetic polyneuropathy associated with type 1 diabetes mellitus                    E10.42

                                        Active              [id number]

 

          Problem    Diabetes              E11.9               Active    [id number]







ALLERGIES

No Information



ENCOUNTERS







                Encounter       Location        Date            Diagnosis

 

                          [facility]     [id number] [place] [address]

                          14 May, 2018               

 

                          [facility]     [id number] [place] [address]

                                         

 

                          [facility]     [id number] [place] [address]

                                         

 

                          [facility]     [id number] [place] [address]

                          20 Dec, 2017               

 

                          [facility]     [id number] [place] [address]

                          05 Dec, 2017               

 

                          [facility]     [id number] [place] [address]

                                         

 

                          [facility]     [id number] [place] [address]

                                         

 

                          [facility]     [id number] [place] [address]

                                        Diabetes E11.9

 

                          [facility]     [id number] [place] [address]

                          24 Aug, 2017              Type 1 diabetes mellitus with other neurologic complication E10.49



 

                          [facility]     [id number] [place] [address]

                          24 Aug, 2017               

 

                          [facility]     [id number] [place] [address]

                          04 Aug, 2017              Diabetes E11.9 ; Erectile dysfunction due to diseases classified 

elsewhere N52.1 ; Diabetic polyneuropathy associated with type 1 diabetes 
mellitus E10.42 and Cigarette nicotine dependence without complication F17.210

 

                          [facility]     [id number] [place] [address]

                                        Hip fracture, left, closed, with routine healing, subsequent encounter

 S72.002D and Allergy, initial encounter T78.40XA

 

                          [facility]     [address] [address]

                                         

 

                          [facility]     [address] [address]

                          30 May, 2017              Femur fracture, left S72.92XA

 

                          [id number] [place] [address]

                          30 May, 2017              Femur fracture, left S72.92XA

 

                          [facility]     [id number] [place] [address]

                          16 May, 2017              Diabetes E11.9 and Femur fracture, left S72.92XA

 

                          [facility]    [address] [id number]

                          09 May, 2017               

 

                          [facility]    [id number] [place] [address]

                          31 May, 2016               

 

                          [facility]     [id number] [place] [address]

                          31 May, 2016               

 

                          [facility]     [id number] [place] [address]

                                         

 

                          [facility]     [id number] [place] [address]

                                         

 

                          [facility] [place] [facility]     [address] [id number] [place] [address]

                          24 Mar,                

 

                          [facility] [place] [facility]     [address] [id number] [place] [address]

                          24 Mar, 2015               

 

                          [facility] [place] [facility]     [address] [id number] [place] [address]

                          04 Mar,                

 

                          [facility] [place] [facility]     [address] [id number] [place] [address]

                          04 Mar,                

 

                          [facility] [place] [facility]     [address] [id number] [place] [address]

                          04 Mar,                

 

                          [facility] [place] [facility]     [address] [id number] [place] [address]

                          04 Mar,                

 

                          [facility] [place] [facility]     [address] [id number] [place] [address]

                          30 Dec, 2014               

 

                          [facility] [place] [facility]     [address] [id number] [place] [address]

                          30 Dec, 2014               

 

                          [facility] [place] [facility]     [address] [id number] [place] [address]

                          18 Dec, 2014               

 

                          [facility] [place] [facility]     [address] [id number] [place] [address]

                          18 Dec, 2014               

 

                          [facility] [place] [facility]     [address] [id number] [place] [address]

                          15 Dec, 2014               

 

                          [facility] [place] [facility]     [address] [id number] [place] [address]

                          12 Dec, 2014               

 

                          [facility] [place] [facility]     [address] [id number] [place] [address]

                          12 Dec, 2014               

 

                          [facility] [place] [facility]     [address] [id number] [place] [address]

                          24 Oct, 2014               

 

                          [facility] [place] [facility]     [address] [id number] [place] [address]

                          24 Oct, 2014               

 

                          [facility] [place] [facility]     [address] [id number] [place] [address]

                          21 Oct, 2014               

 

                          [facility] [place] [facility]     [address] [id number] [place] [address]

                          21 Oct, 2014               

 

                          [facility] [place] [facility]     [address] [id number] [place] [address]

                          16 Sep, 2014               

 

                          [facility] [place] [facility]     [address] [id number] [place] [address]

                          16 Sep, 2014               

 

                          [facility] [place] [facility]     [address] [id number] [place] [address]

                          20 2014               

 

                          [facility] [place] [facility]     [address] [id number] [place] [address]

                                         

 

                          [facility] [place] [facility]     [address] [id number] [place] [address]

                          17 2014               

 

                          [facility] [place] [facility]     [address] [id number] [place] [address]

                                         

 

                          [facility] [place] [facility]     [address] [id number] [place] [address]

                          10 [place], 2014               

 

                          [facility] [place] [facility]     [address] [id number] [place] [address]

                          10 [place], 2014               

 

                          [facility] [place] [facility]     [address] [id number] [place] [address]

                                         

 

                          [facility] [place] [facility]     [address] [id number] [place] [address]

                                         

 

                          [facility] [place] [facility]     [address] [id number] [address]

                          15 2013               

 

                          [facility] [place] [facility]     [address] [id number] [place] [address]

                          14 2013               

 

                          [facility] [place] [facility]     [address] [id number] [place] [address]

                          14 2013               

 

                          [facility] [place] [facility]     [address] [id number] [place] [address]

                          12 2013               

 

                          [facility] [place] [facility]     [address] [id number] [place] [address]

                                         

 

                          [facility]     [address] [id number] [place] [address]

                                         

 

                          [facility]     [address] [id number] [place] [address]

                                         

 

                          [facility]     [address] [id number] [place] [address]

                          29 Oct, 2013               

 

                          [facility]     [address] [id number] [place] [address]

                          29 Oct, 2013               

 

                          [facility]     [address] [id number] [place] [address]

                          13 Aug, 2013               

 

                          [facility]     [address] [id number] [place] [address]

                          23 May, 2013               

 

                          [facility]     [address] [id number] [place] [address]

                                         

 

                          [facility]     [address] [id number] [place] [address]

                                         

 

                          [facility]     [address] [id number] [place] [address]

                          12 Mar, 2013               

 

                          [facility]     [address] [id number] [place] [address]

                          05 Mar, 2013               

 

                          [facility]     [address] [id number] [place] [address]

                                         

 

                          [facility]     [address] [id number] [place] [address]

                                         

 

                          [facility]     [address] [id number] [place] [address]

                                         

 

                          [facility]     [address] [id number] [place] [address]

                          18 Dec, 2009               

 

                          [facility]     [address] [id number] [place] [address]

                                         

 

                          [facility]     [address] [facility] [id number] [place] [address]

                          26 Oct, 2009               

 

                          [facility]     [address] [facility] [id number] [place] [address]

                          23 Oct, 2009               

 

                          [facility]     [address] [facility] [id number] [place] [address]

                          14 Sep, 2009               







IMMUNIZATIONS

No Known Immunizations



SOCIAL HISTORY

Never Assessed



REASON FOR VISIT

upload request to pt



PLAN OF CARE





VITAL SIGNS





MEDICATIONS

Unknown Medications



RESULTS

No Results



PROCEDURES

No Known procedures



INSTRUCTIONS





MEDICATIONS ADMINISTERED

No Known Medications



MEDICAL (GENERAL) HISTORY







                    Type                Description         Date

 

                    Medical History     Diabetes             

 

                    Surgical History    Left hip surgery    

 

                    Surgical History    tonsilectomy         

 

                    H[facility]ization History     [facility]

## 2019-07-03 NOTE — XMS REPORT
[facility]

                             Created on: 2018



Dat Ruiz

External Reference #: [id number]

: 1990

Sex: Male



Demographics







                          Address                   [address] [place]  [address]

 

                          Preferred Language        Unknown

 

                          Marital Status            Unknown

 

                          Protestant Affiliation     Unknown

 

                          Race                      Unknown

 

                          Ethnic Group              Unknown





Author







                          Author                    VERONICA MENDES

 

                          [facility]

 

                          Address                   [address] [place]  [address]



 

                          Phone                     [phone]







Care Team Providers







                    Care Team Member Name    Role                Phone

 

                    VERONICA MENDES     Unavailable         [phone]







PROBLEMS







          Type      Condition    ICD9-CM Code    OAR18-VC Code    Onset Dates    Condition Status    SNOMED

 Code

 

             Problem      Erectile dysfunction due to diseases classified elsewhere                 N52.1          

                          Active                    [id number]

 

           Problem    Cigarette nicotine dependence without complication               F17.210               Active

                                        [id number]

 

                Problem         Diabetic polyneuropathy associated with type 1 diabetes mellitus                    E10.42

                                        Active              [id number]

 

          Problem    Diabetes              E11.9               Active    [id number]







ALLERGIES

No Information



ENCOUNTERS







                Encounter       Location        Date            Diagnosis

 

                          [facility]     [id number] [place] [address]

                                         

 

                          [facility]     [id number] [place] [address]

                                        Diabetes E11.9

 

                          [facility]     [id number] [place] [address]

                                         

 

                          [facility]     [id number] [place] [address]

                          14 May, 2018               

 

                          [facility]     [id number] [place] [address]

                                         

 

                          [facility]     [id number] [place] [address]

                                         

 

                          [facility]     [id number] [place] [address]

                          20 Dec, 2017               

 

                          [facility]     [id number] [place] [address]

                          05 Dec, 2017               

 

                          [facility]     [id number] [place] [address]

                                         

 

                          [facility]     [id number] [place] [address]

                                         

 

                          [facility]     [id number] [place] [address]

                                        Diabetes E11.9

 

                          [place] [id number] [place] [address]

                          24 Aug, 2017              Type 1 diabetes mellitus with other neurologic complication E10.49



 

                          [facility]     [id number] [place] [address]

                          24 Aug, 2017               

 

                          [address] [address]

                          04 Aug, 2017              Diabetes E11.9 ; Erectile dysfunction due to diseases classified 

elsewhere N52.1 ; Diabetic polyneuropathy associated with type 1 diabetes 
mellitus E10.42 and Cigarette nicotine dependence without complication F17.210

 

                          [place] [id number] [place] [address]

                                        Hip fracture, left, closed, with routine healing, subsequent encounter

 S72.002D and Allergy, initial encounter T78.40XA

 

                          [place] [id number] [place] [address]

                                         

 

                          [place] [id number] [place] [address]

                          30 May, 2017              Femur fracture, left S72.92XA

 

                          [place] [id number] [place] [address]

                          30 May, 2017              Femur fracture, left S72.92XA

 

                          [place] [id number] [place] [address]

                          16 May, 2017              Diabetes E11.9 and Femur fracture, left S72.92XA

 

                          [facility] [place]    [id number] [place] [phone]

                          09 May, 2017               

 

                          [facility]    [id number] [place] [address]

                          31 May, 2016               

 

                          [facility] [place] [facility]     [address] [id number] [place] [address]

                          31 May, 2016               

 

                          [facility] [place] [facility]     [address] [id number] [place] [address]

                          14 2015               

 

                          [facility] [place] [facility]     [address] [id number] [place] [address]

                                         

 

                          [facility] [place] [facility]     [address] [id number] [place] [address]

                          24 Mar, 2015               

 

                          [facility] [place] [facility]     [address] [id number] [place] [address]

                          24 Mar, 2015               

 

                          [facility] [place] [facility]     [address] [id number] [place] [address]

                          04 Mar, 2015               

 

                          [facility] [place] [facility]     [address] [id number] [place] [address]

                          04 Mar, 2015               

 

                          [facility] [place] [facility]     [address] [id number] [place] [address]

                          04 Mar, 2015               

 

                          [facility] [place] [facility]     [address] [id number] [place] [address]

                          04 Mar, 2015               

 

                          [facility] [place] [facility]     [address] [id number] [place] [address]

                          30 Dec, 2014               

 

                          [facility] [place] [facility]     [address] [id number] [place] [address]

                          30 Dec, 2014               

 

                          [facility] [place] [facility]     [address] [id number] [place] [address]

                          18 Dec, 2014               

 

                          [facility] [place] [facility]     [address] [id number] [place] [address]

                          18 Dec, 2014               

 

                          [facility] [place] [facility]     [address] [id number] [place] [address]

                          15 Dec, 2014               

 

                          [facility] [place] [facility]     [address] [id number] [place] [address]

                          12 Dec, 2014               

 

                          [facility] [place] [facility]     [address] [id number] [address]

                          12 Dec, 2014               

 

                          [facility] [place] [facility]     [address] [id number] [place] [address]

                          24 Oct, 2014               

 

                          [facility] [place] [facility]     [address] [id number] [place] [address]

                          24 Oct, 2014               

 

                          [facility] [place] [facility]     [address] [id number] [place] [address]

                          21 Oct, 2014               

 

                          [facility] [place] [facility]     [address] [id number] [place] [address]

                          21 Oct, 2014               

 

                          [facility] [place] [facility]     [address] [id number] [place] [address]

                          16 Sep, 2014               

 

                          [facility] [place] [facility]     [address] [id number] [place] [address]

                          16 Sep, 2014               

 

                          [facility] [place] [facility]     [address] [id number] [place] [address]

                                         

 

                          [facility] [place] [facility]     [address] [id number] [place] [address]

                                         

 

                          [facility] [place] [facility]     [address] [facility] [id number] [place] [address]

                                         

 

                          [facility] [place] [facility]     [address] [facility] [id number] [place] [address]

                                         

 

                          [facility] [place] [facility]     [address] [id number] [place] [address]

                          10 [place], 2014               

 

                          [facility] [place] [facility]     [address] [id number] [place] [address]

                          10 [place], 2014               

 

                          [facility] [place] [facility]     [address] [id number] [place] [address]

                                         

 

                          [facility] [place] [facility]     [address] [id number] [place] [address]

                                         

 

                          [facility] [place] [facility]     [address] [facility] [id number] [place] [address]

                          15 2013               

 

                          [facility] [place] [facility]     [address] [id number] [place] [address]

                          14 2013               

 

                          [facility]     [address] [id number] [place] [address]

                          14 2013               

 

                          [facility] [place] [facility]     [address] [id number] [place] [address]

                                         

 

                          [facility] [place] [facility]     [address] [id number] [place] [address]

                                         

 

                          [facility] [place] [facility]     [address] [id number] [place] [address]

                                         

 

                          [facility] [place] [facility]     [address] [id number] [place] [address]

                                         

 

                          [facility] [place] [facility]     [address] [id number] [place] [address]

                          29 Oct, 2013               

 

                          [facility] [place] [facility]     [address] [id number] [place] [address]

                          29 Oct, 2013               

 

                          [facility] [place] [facility]     [address] [id number] [place] [address]

                          13 Aug, 2013               

 

                          [facility] [place] [facility]     [address] [id number] [place] [address]

                          23 May, 2013               

 

                          [facility] [place] [facility]     [address] [id number] [place] [address]

                                         

 

                          [facility] [place] [facility]     [address] [id number] [place] [address]

                                         

 

                          [facility] [place] [facility]     [address] [id number] [place] [address]

                          12 Mar, 2013               

 

                          [facility] [place] [facility]     [address] [id number] [place] [address]

                          05 Mar, 2013               

 

                          [facility] [place] [facility]     [address] [id number] [place] [address]

                          16 2012               

 

                          [facility] [place] [facility]     [address] [id number] [place] [address]

                                         

 

                          [facility]     [address] [id number] [address]

                                         

 

                          [facility]     [id number] [address]

                          18 Dec, 2009               

 

                          [facility]     [address] [id number] [address]

                                         

 

                          [facility]     [id number] [address]

                          26 Oct, 2009               

 

                          [facility]     [id number] [address]

                          23 Oct, 2009               

 

                          [facility]     [id number] [address]

                          14 Sep, 2009               







IMMUNIZATIONS

No Known Immunizations



SOCIAL HISTORY

Never Assessed



REASON FOR VISIT

upload request



PLAN OF CARE





VITAL SIGNS





MEDICATIONS

Unknown Medications



RESULTS

No Results



PROCEDURES

No Known procedures



INSTRUCTIONS





MEDICATIONS ADMINISTERED

No Known Medications



MEDICAL (GENERAL) HISTORY







                    Type                Description         Date

 

                    Medical History     Diabetes             

 

                    Surgical History    Left hip surgery    2017

 

                    Surgical History    tonsilectomy         

 

                    Hospitalization History     [facility]

## 2019-07-03 NOTE — ED LOWER EXTREMITY
General


Chief Complaint:  Laceration


Stated Complaint:  R FOOT LAC


Nursing Triage Note:  


PT STATES HE STEPPED ON A PIECE OF TILE 30 MINUTES PRIOR TO ARRIVAL. PT IS A 


TYPE 1 DIABETIC AND WAS CONCERNED FOR HEALING OF WOUND. PT UNAWARE OF LAST TDAP.




LACERATION TO RIGHT HEEL.


Nursing Sepsis Screen:  No Definite Risk


Source:  patient


Exam Limitations:  no limitations





History of Present Illness


Date Seen by Provider:  Jul 3, 2019


Time Seen by Provider:  16:44


Initial Comments


Type I diabetic with a laceration to the plantar surface heel of the right foot 

about 30 minutes prior to arrival after stepping on a piece of broken tile. 

Bleeding is controlled.


Onset:  just prior to arrival


Severity:  moderate


Pain/Injury Location:  right foot


Modifying Factors:  Improves With Movement





Allergies and Home Medications


Allergies


Coded Allergies:  


     fluoxetine (Verified  Allergy, Unknown, 6/22/09)


     trazodone (Unverified  Allergy, Unknown, 11/30/14)





Home Medications


Bisacodyl 5 Mg Tablet.dr, 5 MG PO DAILY PRN for CONSTIPATION-1ST LINE


   Prescribed by: LLUVIA GA on 5/9/17 1120


Hydrocodone Bit/Acetaminophen 1 Each Tablet, 1-2 TAB PO Q6H PRN for PAIN-

MODERATE TO SEVERE


   Prescribed by: LLUVIA GA on 5/9/17 1120


Insulin Regular, Human 100 Unit/1 Ml Vial, 10-15 UNIT SQ AC, (Reported)


   10-15 UNITS BASED ON CARB COUNTING 


Sulfamethoxazole/Trimethoprim 1 Each Tablet, 1 EACH PO BID


   Prescribed by: ANDI RILEY on 9/8/18 1627


Warfarin Sodium 5 Mg Tablet, 5 MG PO DAILY@1800


   Prescribed by: LLUVIA GA on 5/9/17 1120





Patient Home Medication List


Home Medication List Reviewed:  Yes





Review of Systems


Constitutional:  see HPI


EENTM:  see HPI


Respiratory:  no symptoms reported


Cardiovascular:  no symptoms reported


Genitourinary:  no symptoms reported


Musculoskeletal:  no symptoms reported


Skin:  see HPI


Psychiatric/Neurological:  No Symptoms Reported





Past Medical-Social-Family Hx


Patient Social History


Alcohol Use:  Denies Use


Recreational Drug Use:  No


Drug of Choice:  MARIJUANA-EVERY ONCE IN AWHILE


Smoking Status:  Current Everyday Smoker


Type Used:  Cigarettes


2nd Hand Smoke Exposure:  Yes


Recent Foreign Travel:  No


Contact w/Someone Who Travel:  No


Recent Infectious Disease Expo:  No


Recent Hopitalizations:  No


Physical Abuse:  No


Sexual Abuse:  No





Immunizations Up To Date


Tetanus Booster (TDap):  Unknown


PED Vaccines UTD:  Yes





Seasonal Allergies


Seasonal Allergies:  No





Past Medical History


Surgeries:  Yes (EGD)


Respiratory:  Yes


Asthma


Currently Using CPAP:  No


Currently Using BIPAP:  No


Cardiac:  No


Neurological:  Yes


Neuropathy


Reproductive Disorders:  No


Sexually Transmitted Disease:  No


HIV/AIDS:  No


Genitourinary:  Yes


Prostate Problems


Gastrointestinal:  Yes (current constipation)


Gastroesophageal Reflux, Liver Disease/Jaundice


Musculoskeletal:  No


Endocrine:  Yes (Type I)


Diabetes, Insulin dep


HEENT:  No


Cancer:  No


Psychosocial:  Yes


Anxiety, Bipolar, Depression


Integumentary:  Yes


Eczema


Blood Disorders:  No


Adverse Reaction/Blood Tranf:  No





Family Medical History





Diabetes mellitus


  19 FATHER


  UNCLE


FH: COPD (chronic obstructive pulmonary disease)


  19 MOTHER


FH: brain cancer


  AUNT


FH: diverticulitis


  19 FATHER


Thyroid disease


  19 MOTHER


No Pertinent Family Hx





Physical Exam


Vital Signs





Vital Signs - First Documented








 7/3/19





 16:18


 


Temp 97.6


 


Pulse 98


 


Resp 18


 


B/P (MAP) 132/92 (105)


 


Pulse Ox 99


 


O2 Delivery Room Air





Capillary Refill : Less Than 3 Seconds


Height, Weight, BMI


Height: 5'10.00"


Weight: 160lbs. 3.0oz. 72.157796ym; 21.2 BMI


Method:Stated


General Appearance:  WD/WN, no apparent distress


Respiratory:  no respiratory distress, no accessory muscle use


Hips:  bilateral hip non-tender, bilateral hip normal inspection, bilateral hip 

normal range of motion


Legs:  bilateral leg non-tender, bilateral leg normal inspection, bilateral leg 

normal range of motion


Knees:  bilateral knee non-tender, bilateral knee normal inspection


Ankles:  bilateral ankle non-tender, bilateral ankle normal inspection, bilat

eral ankle normal range of motion


Feet:  right foot other (there is a 1.5 cm laceration to the plantar surface 

heel of the right foot. No active bleeding. This was down to the subcutaneous 

tissue, this was cleansed with chlorhexidine/saline solution, no foreign body 

identified. We will leave this open to heal via secondary intention. This was 

wrapped with antibiotic ointment and gauze roll.)


Neurologic/Psychiatric:  alert, normal mood/affect, oriented x 3


Skin:  normal color, warm/dry





Progress/Results/Core Measures


Results/Orders


My Orders





Orders - MARTA BOONE APRN


Dipht,Pertuss(Acell),Tet Adult (Boostrix (7/3/19 16:45)





Vital Signs/I&O











 7/3/19





 16:18


 


Temp 97.6


 


Pulse 98


 


Resp 18


 


B/P (MAP) 132/92 (105)


 


Pulse Ox 99


 


O2 Delivery Room Air














Blood Pressure Mean:                    105











Departure


Impression





   Primary Impression:  


   Foot laceration


   Qualified Codes:  S91.311A - Laceration without foreign body, right foot, 

   initial encounter


Disposition:  01 HOME, SELF-CARE


Condition:  Stable





Departure-Patient Inst.


Decision time for Depature:  16:56


Referrals:  


VERONICA MENDES MD (PCP/Family)


Primary Care Physician


Patient Instructions:  Wound Care





Add. Discharge Instructions:  


1. Wash this gently with antibacterial soap such as Dial and water twice daily. 

Keep this covered with a gauze wrap for the next 3-5 days. Antibiotics as 

directed. Return to ER for any redness fevers or chills. Follow-up with your 

doctor next week for recheck. All discharge instructions reviewed with patient 

and/or family. Voiced understanding.


Scripts


Cephalexin (Keflex) 500 Mg Capsule


500 MG PO TID, #15 CAP


   Prov: MARTA BOONE APRN         7/3/19











MARTA BOONE APRN              Jul 3, 2019 16:47

## 2019-07-03 NOTE — XMS REPORT
[facility]

                             Created on: 2018



Dat Ruiz

External Reference #: [id number]

: 1990

Sex: Male



Demographics







                          Address                   [address] [place]  [address]

 

                          Preferred Language        Unknown

 

                          Marital Status            Unknown

 

                          Lutheran Affiliation     Unknown

 

                          Race                      Unknown

 

                          Ethnic Group              Unknown





Author







                          Author                    VERONICA MENDES

 

                          Organization              [facility]

 

                          Address                   [address] [place]  [address]



 

                          Phone                     [phone]







Care Team Providers







                    Care Team Member Name    Role                Phone

 

                    VERONICA MENDES     Unavailable         [phone]







PROBLEMS







          Type      Condition    ICD9-CM Code    CSV75-DU Code    Onset Dates    Condition Status    SNOMED

 Code

 

             Problem      Erectile dysfunction due to diseases classified elsewhere                 N52.1          

                          Active                    356733279

 

           Problem    Cigarette nicotine dependence without complication               F17.210               Active

                                        [id number]

 

                Problem         Diabetic polyneuropathy associated with type 1 diabetes mellitus                    E10.42

                                        Active              90500035

 

          Problem    Diabetes              E11.9               Active    [id number]







ALLERGIES

No Information



ENCOUNTERS







                Encounter       Location        Date            Diagnosis

 

                          [place] [id number] [place] [address]

                                         

 

                          [facility]     [id number] [place] [address]

                                         

 

                          [facility]     [id number] [place] [address]

                          20 Dec, 2017               

 

                          [facility]     [id number] [place] [address]

                          05 Dec, 2017               

 

                          [facility]     [id number] [place] [address]

                                         

 

                          [facility]     [id number] [place] [address]

                                         

 

                          [facility]     [id number] [place] [address]

                                        Diabetes E11.9

 

                          [facility]     [id number] [place] [address]

                          24 Aug, 2017              Type 1 diabetes mellitus with other neurologic complication E10.49



 

                          [facility]     [id number] [place] [address]

                          24 Aug, 2017               

 

                          [facility]     [id number] [place] [address]

                          04 Aug, 2017              Diabetes E11.9 ; Erectile dysfunction due to diseases classified 

elsewhere N52.1 ; Diabetic polyneuropathy associated with type 1 diabetes 
mellitus E10.42 and Cigarette nicotine dependence without complication F17.210

 

                          [facility]     [id number] [place] [address]

                                        Hip fracture, left, closed, with routine healing, subsequent encounter

 S72.002D and Allergy, initial encounter T78.40XA

 

                          [facility]     [address] [address]

                                         

 

                          [facility]     [id number] [place] [address]

                          30 May, 2017              Femur fracture, left S72.92XA

 

                          [facility]     [address] [address]

                          30 May, 2017              Femur fracture, left S72.92XA

 

                          [facility]     [id number] [place] [address]

                          16 May, 2017              Diabetes E11.9 and Femur fracture, left S72.92XA

 

                          [facility]    [id number] [place] [phone]

                          09 May, 2017               

 

                          [facility]    [id number] [place] [address]

                          31 May, 2016               

 

                          [facility]     [id number] [place] [address]

                          31 May, 2016               

 

                          [facility]     [id number] [place] [address]

                                         

 

                          [facility]     [id number] [place] [address]

                                         

 

                          [facility]     [id number] [place] [address]

                          24 Mar, 2015               

 

                          [facility] [place] [facility]     [address] [id number] [place] [address]

                          24 Mar,                

 

                          [facility] [place] [facility]     [address] [id number] [place] [address]

                          04 Mar,                

 

                          [facility] [place] [facility]     [address] [id number] [place] [address]

                          04 Mar,                

 

                          [facility] [place] [facility]     [address] [id number] [place] [address]

                          04 Mar,                

 

                          [facility] [place] [facility]     [address] [id number] [place] [address]

                          04 Mar,                

 

                          [facility] [place] [facility]     [address] [id number] [place] [address]

                          30 Dec, 2014               

 

                          [facility] [place] [facility]     [address] [id number] [place] [address]

                          30 Dec, 2014               

 

                          [facility] [place] [facility]     [address] [id number] [place] [address]

                          18 Dec, 2014               

 

                          [facility] [place] [facility]     [address] [id number] [place] [address]

                          18 Dec, 2014               

 

                          [facility] [place] [facility]     [address] [id number] [place] [address]

                          15 Dec, 2014               

 

                          [facility] [place] [facility]     [address] [id number] [place] [address]

                          12 Dec, 2014               

 

                          [facility] [place] [facility]     [address] [id number] [place] [address]

                          12 Dec, 2014               

 

                          [facility] [place] [facility]     [address] [id number] [place] [address]

                          24 Oct, 2014               

 

                          [facility] [place] [facility]     [address] [id number] [place] [address]

                          24 Oct, 2014               

 

                          [facility] [place] [facility]     [address] [id number] [place] [address]

                          21 Oct, 2014               

 

                          [facility] [place] [facility]     [address] [id number] [place] [address]

                          21 Oct, 2014               

 

                          [facility] [place] [facility]     [address] [id number] [place] [address]

                          16 Sep, 2014               

 

                          [facility] [place] [facility]     [address] [id number] [place] [address]

                          16 Sep, 2014               

 

                          [facility] [place] [facility]     [address] [id number] [place] [address]

                          20 2014               

 

                          [facility] [place] [facility]     [address] [id number] [place] [address]

                          20 2014               

 

                          [facility] [place] [facility]     [address] [id number] [place] [address]

                          17 2014               

 

                          [facility] [place] [facility]     [address] [id number] [place] [address]

                          17 2014               

 

                          [facility] [place] [facility]     [address] [id number] [place] [address]

                          10 [place], 2014               

 

                          [facility] [place] [facility]     [address] [id number] [place] [address]

                          10 [place], 2014               

 

                          [facility] [place] [facility]     [address] [id number] [place] [address]

                                         

 

                          [facility] [place] [facility]     [address] [id number] [place] [address]

                                         

 

                          [facility] [place] [facility]     [address] [id number] [address]

                          15 2013               

 

                          [facility] [place] [facility]     [address] [id number][place] [address]

                          14 2013               

 

                          [facility] [place] [facility]     [address] [id number] [place] [address]

                          14 2013               

 

                          [facility] [place] [facility]     [address] [id number] [place] [address]

                          12 2013               

 

                          [facility] [place] [facility]     [address] [id number] [place] [address]

                                         

 

                          [facility] [place] [facility]     [address] [id number] [place] [address]

                                         

 

                          [facility]     [address] [id number] [place] [address]

                                         

 

                          [facility]     [address] [id number] [place] [address]

                          29 Oct, 2013               

 

                          [facility]     [address] [id number] [place] [address]

                          29 Oct, 2013               

 

                          [facility]     [address] [id number] [place] [address]

                          13 Aug, 2013               

 

                          [facility]     [address] [id number] [place] [address]

                          23 May, 2013               

 

                          [facility]     [address] [id number] [place] [address]

                                         

 

                          [facility]     [address] [id number] [place] [address]

                                         

 

                          [facility]     [address] [id number] [place] [address]

                          12 Mar, 2013               

 

                          [facility]     [address] [id number] [place] [address]

                          05 Mar, 2013               

 

                          [facility]     [address] [facility] [id number] [place] [address]

                                         

 

                          [facility]     [address] [id number] [place] [address]

                                         

 

                          [facility]     [address] [id number] [place] [address]

                                         

 

                          [facility]     [address] [id number] [place] [address]

                          18 Dec, 2009               

 

                          [facility]     [address] [id number] [place] [address]

                                         

 

                          [facility]     [address] [id number] [place] [address]

                          26 Oct, 2009               

 

                          [facility]     [address] [facility] [id number] [place] [address]

                          23 Oct, 2009               

 

                          [facility]     [address] [facility] [id number] [place] [address]

                          14 Sep, 2009               







IMMUNIZATIONS

No Known Immunizations



SOCIAL HISTORY

Never Assessed



REASON FOR VISIT

insulin pump inquiry attempt



PLAN OF CARE





VITAL SIGNS





MEDICATIONS







        Medication    Instructions    Dosage    Frequency    Start Date    End Date    Duration    Status



 

                          Blood Glucose Test -      and lancets DX E10.42 Test fasting and 2 hours after each 

meal.      test blood sugar               24 Aug, 2017                          Active

 

                    Blood Glucose Monitor System w/Device    DX E 11.42 4 times a day    test blood sugar

             6h           24 Aug, 2017                              Active







RESULTS

No Results



PROCEDURES

No Known procedures



INSTRUCTIONS





MEDICATIONS ADMINISTERED

No Known Medications



MEDICAL (GENERAL) HISTORY







                    Type                Description         Date

 

                    Medical History     Diabetes             

 

                    Surgical History    Left hip surgery    

 

                    Surgical History    tonsilectomy         

 

                    H[facility]ization History     [facility]

## 2019-07-03 NOTE — XMS REPORT
[facility]

                             Created on: 05/15/2019



Dat Ruiz

External Reference #: [id number]

: 1990

Sex: Male



Demographics







                          Address                   [address] [place]  [address]

 

                          Preferred Language        Unknown

 

                          Marital Status            Unknown

 

                          Muslim Affiliation     Unknown

 

                          Race                      Unknown

 

                          Ethnic Group              Unknown





Author







                          Author                    VERONICA MENDES

 

                          [facility]

 

                          Address                   [address] [place]  [address]



 

                          Phone                     [phone]







Care Team Providers







                    Care Team Member Name    Role                Phone

 

                    VERONICA MENDES     Unavailable         [phone]







PROBLEMS







          Type      Condition    ICD9-CM Code    [id number] Code    Onset Dates    Condition Status    SNOMED

 Code

 

           Problem    Cigarette nicotine dependence without complication               F17.210               Active

                                        [id number]

 

             Problem      Erectile dysfunction due to diseases classified elsewhere                 N52.1          

                          Active                    [id number]

 

          Problem    Diabetes              E11.9               Active    [id number]

 

                Problem         Diabetic polyneuropathy associated with type 1 diabetes mellitus                    E10.42

                                        Active              [id number]







ALLERGIES

No Information



ENCOUNTERS







                Encounter       Location        Date            Diagnosis

 

                          [facility]     [address] [place] [address]

                          06 May, 2019               

 

                          [facility]     [address] [place] [address]

                          05 Mar, 2019               

 

                          [facility]     [id number] [place] [address]

                                         

 

                          [facility]     [id number] [place] [address]

                                         

 

                          [facility]     [id number] [place] [address]

                          28 Dec, 2018              Diabetes E11.9

 

                          [facility]     [address] [address]

                          25 Oct, 2018               

 

                          [facility]     [id number] [place] [address]

                          22 Oct, 2018               

 

                          [facility]     [id number] [place] [address]

                          19 Oct, 2018              Diabetic polyneuropathy associated with type 1 diabetes mellitus 

E10.42

 

                          [facility]     [id number] [address]

                          15 Oct, 2018              Diabetes E11.9

 

                          [facility]     [address] [address]

                                         

 

                          [facility]     [address] [address]

                                        Diabetes E11.9

 

                          [facility]     [id number] [place] [address]

                                         

 

                          [facility]     [id number] [place] [address], [place] [address]

                          14 May, 2018               

 

                          [facility]     [id number] [place] [address], [place] [address]

                                         

 

                          [facility]     [address] [address]

                                         

 

                          [facility]     [id number] [place] [address], [place] [address]

                          20 Dec, 2017               

 

                          [facility]     [address] [address]

                          05 Dec, 2017               

 

                          [facility]     [address] [address]

                                         

 

                          [facility]     [address] [address]

                                         

 

                          [facility]     [address] [address]

                                        Diabetes E11.9

 

                          [facility]     [id number] [place] [address]

                          24 Aug, 2017              Type 1 diabetes mellitus with other neurologic complication E10.49



 

                          [facility]     [address] [address]

                          24 Aug, 2017               

 

                          [facility]     [address] [address]

                          04 Aug, 2017              Diabetes E11.9 ; Erectile dysfunction due to diseases classified 

elsewhere N52.1 ; Diabetic polyneuropathy associated with type 1 diabetes 
mellitus E10.42 and Cigarette nicotine dependence without complication F17.210

 

                          [facility]     [id number] [place] [address]

                                        Hip fracture, left, closed, with routine healing, subsequent encounter

 S72.002D and Allergy, initial encounter T78.40XA

 

                          [facility]     [address] [address]

                                         

 

                          [facility]     [address] [address]

                          30 May, 2017              Femur fracture, left S72.92XA

 

                          [facility]     [address] [address]

                          30 May, 2017              Femur fracture, left S72.92XA

 

                          [facility]     [address] [address]

                          16 May, 2017              Diabetes E11.9 and Femur fracture, left S72.92XA

 

                          [facility]    [address] [id number]

                          09 May, 2017               

 

                          [facility]    [id number] [place] [address]

                          31 May, 2016               

 

                          [facility]     [id number] [place] [address]

                          31 May, 2016               

 

                          [facility]     [id number] [place] [address]

                                         

 

                          [facility]     [id number] [place] [address]

                                         

 

                          [facility]     [address] [address]

                          24 Mar, 2015               

 

                          [facility]     [id number] [place] [address]

                          24 Mar, 2015               

 

                          [facility]     [id number] [place] [address]

                          04 Mar, 2015               

 

                          [facility] [place] [facility]     [address] [id number] [place] [address]

                          04 Mar,                

 

                          [facility] [place] [facility]     [address] [id number] [place] [address]

                          04 Mar, 2015               

 

                          [facility] [place] [facility]     [address] [id number] [place] [address]

                          04 Mar, 2015               

 

                          [facility] [place] [facility]     [address] [id number] [place] [address]

                          30 Dec, 2014               

 

                          [facility] [place] [facility]     [address] [id number] [place] [address]

                          30 Dec, 2014               

 

                          [facility] [place] [facility]     [address] [id number] [place] [address]

                          18 Dec, 2014               

 

                          [facility] [place] [facility]     [address] [id number] [place] [address]

                          18 Dec, 2014               

 

                          [facility] [place] [facility]     [address] [id number] [place] [address]

                          15 Dec, 2014               

 

                          [facility] [place] [facility]     [address] [id number] [place] [address]

                          12 Dec, 2014               

 

                          [facility] [place] [facility]     [address] [id number] [place] [address]

                          12 Dec, 2014               

 

                          [facility] [place] [facility]     [address] [id number] [place] [address]

                          24 Oct, 2014               

 

                          [facility] [place] [facility]     [address] [id number] [place] [address]

                          24 Oct, 2014               

 

                          [facility] [place] [facility]     [address] [id number] [place] [address]

                          21 Oct, 2014               

 

                          [facility] [place] [facility]     [address] [id number] [place] [address]

                          21 Oct, 2014               

 

                          [facility] [place] [facility]     [address] [id number] [place] [address]

                          16 Sep, 2014               

 

                          [facility] [place] [facility]     [address] [id number] [place] [address]

                          16 Sep, 2014               

 

                          [facility] [place] [facility]     [address] [id number] [place] [address]

                          20 2014               

 

                          [facility] [place] [facility]     [address] [id number] [place] [address]

                          20 2014               

 

                          [facility] [place] [facility]     [address] [id number] [place] [address]

                          17 2014               

 

                          [facility] [place] [facility]     [address] [id number] [place] [address]

                          17 2014               

 

                          [facility] [place] [facility]     [address] [id number] [place] [address]

                          10 [place], 2014               

 

                          [facility] [place] [facility]     [address] [id number] [place] [address]

                          10 [place], 2014               

 

                          [facility] [place] [facility]     [address] [id number] [place] [address]

                                         

 

                          [facility] [place] [facility]     [address] [id number] [place] [address]

                                         

 

                          [facility] [place] [facility]     [address] [id number] [place] [address]

                          15 2013               

 

                          [facility] [place] [facility]     [address] [id number] [place] [address]

                          14 2013               

 

                          [facility] [place] [facility]     [address] [id number] [place] [address]

                          14 2013               

 

                          [facility] [place] [facility]     [address] [id number] [address]

                          12 2013               

 

                          [facility] [place] [facility]     [address] [id number] [place] [address]

                          12 2013               

 

                          [facility] [place] [facility]     [address] [id number] [place] [address]

                          05 2013               

 

                          [facility] [place] [facility]     [address] [id number] [address]

                          05 2013               

 

                          [facility] [place] [facility]     [address] [id number] [place] [address]

                          29 Oct, 2013               

 

                          [facility]     [address] [id number] [place] [address]

                          29 Oct, 2013               

 

                          [facility] [place] [facility]     [address] [id number] [place] [address]

                          13 Aug, 2013               

 

                          [facility] [place] [facility]     [address] [id number] [place] [address]

                          23 May, 2013               

 

                          [facility] [place] [facility]     [address] [id number] [place] [address]

                                         

 

                          [facility] [place] [facility]     [address] [id number] [place] [address]

                                         

 

                          [facility]     [address] [id number] [place] [address]

                          12 Mar, 2013               

 

                          [facility]     [address] [id number] [place] [address]

                          05 Mar, 2013               

 

                          [facility]     [address] [id number] [place] [address]

                                         

 

                          [facility]     [address] [id number] [place] [address]

                                         

 

                          [facility]     [address] [id number] [place] [address]

                                         

 

                          [facility]     [address] [id number] [place] [address]

                          18 Dec, 2009               

 

                          [facility] [place] [facility]     [address] [id number] [place] [address]

                                         

 

                          [facility] [place] [facility]     [address] [id number] [place] [address]

                          26 Oct, 2009               

 

                          [facility] [place] [facility]     [address] [id number] [place] [address]

                          23 Oct, 2009               

 

                          [facility] [place] [facility]     [address] [id number] [place] [address]

                          14 Sep, 2009               







IMMUNIZATIONS

No Known Immunizations



SOCIAL HISTORY

Never Assessed



REASON FOR VISIT

upload request



PLAN OF CARE





VITAL SIGNS





MEDICATIONS

Unknown Medications



RESULTS

No Results



PROCEDURES

No Known procedures



INSTRUCTIONS





MEDICATIONS ADMINISTERED

No Known Medications



MEDICAL (GENERAL) HISTORY







                    Type                Description         Date

 

                    Medical History     Diabetes             

 

                    Surgical History    Left hip surgery    

 

                    Surgical History    tonsilectomy         

 

                    Hospitalization History    [facility] for Left hip surgery

## 2019-07-03 NOTE — XMS REPORT
[facility]

                             Created on: 2018



Dat Ruiz

External Reference #: [id number]

: 1990

Sex: Male



Demographics







                          Address                   [address] [place]  [address]

 

                          Preferred Language        Unknown

 

                          Marital Status            Unknown

 

                          Shinto Affiliation     Unknown

 

                          Race                      Unknown

 

                          Ethnic Group              Unknown





Author







                          Author                    VERONICA MENDES

 

                          Organization              [facility]

 

                          Address                   [address] [place]  [address]



 

                          Phone                     [phone]







Care Team Providers







                    Care Team Member Name    Role                Phone

 

                    VERONICA MENDES     Unavailable         [phone]







PROBLEMS







          Type      Condition    ICD9-CM Code    IME78-SL Code    Onset Dates    Condition Status    SNOMED

 Code

 

             Problem      Erectile dysfunction due to diseases classified elsewhere                 N52.1          

                          Active                    528682462

 

           Problem    Cigarette nicotine dependence without complication               F17.210               Active

                                        [id number]

 

                Problem         Diabetic polyneuropathy associated with type 1 diabetes mellitus                    E10.42

                                        Active              [id number]

 

          Problem    Diabetes              E11.9               Active    [id number]







ALLERGIES







             Substance    Reaction     Event Type    Date         Status

 

             Prozac       shortness of breath    Drug Allergy        Active







ENCOUNTERS







                Encounter       Location        Date            Diagnosis

 

                          [facility]     [id number] [address]

                                         

 

                          [facility]     [id number] [address]

                                        Diabetes E11.9

 

                          [facility]     [id number] [address]

                                         

 

                          [facility]     [id number] [address]

                          14 May, 2018               

 

                          [facility]     [id number] [address]

                                         

 

                          [facility]     [id number] [address]

                                         

 

                          [facility]     [id number] [address]

                          20 Dec, 2017               

 

                          [facility]     [id number] [address]

                          05 Dec, 2017               

 

                          [facility]     [id number] [address]

                                         

 

                          [facility]     [address] [place] [address]

                                         

 

                          [facility]     [id number] [place] [address]

                                        Diabetes E11.9

 

                          [place] [id number] [place] [address]

                          24 Aug, 2017              Type 1 diabetes mellitus with other neurologic complication E10.49



 

                          [address] [id number] [place] [address]

                          24 Aug, 2017               

 

                          [place] [id number] [place] [address]

                          04 Aug, 2017              Diabetes E11.9 ; Erectile dysfunction due to diseases classified 

elsewhere N52.1 ; Diabetic polyneuropathy associated with type 1 diabetes 
mellitus E10.42 and Cigarette nicotine dependence without complication F17.210

 

                          [place] [id number] [address]

                                        Hip fracture, left, closed, with routine healing, subsequent encounter

 S72.002D and Allergy, initial encounter T78.40XA

 

                          [place] [address] [address]

                                         

 

                          [place] [id number] [address]

                          30 May, 2017              Femur fracture, left S72.92XA

 

                          [place] [id number] [place] [address]

                          30 May, 2017              Femur fracture, left S72.92XA

 

                          [place] [id number] [place] [address]

                          16 May, 2017              Diabetes E11.9 and Femur fracture, left S72.92XA

 

                          [facility]    [id number] [place] [id number]

                          09 May, 2017               

 

                          [facility]    [id number] [place] [address]

                          31 May, 2016               

 

                          [facility]     [address] [id number] [place] [address]

                          31 May, 2016               

 

                          [facility] [place] [facility]     [address] [id number] [place] [address]

                          14 2015               

 

                          [facility] [place] [facility]     [address] [id number] [place] [address]

                                         

 

                          [facility] [place] [facility]     [address] [id number] [place] [address]

                          24 Mar, 2015               

 

                          [facility] [place] [facility]     [address] [id number] [place] [address]

                          24 Mar, 2015               

 

                          [facility] [place] [facility]     [address] [id number] [place] [address]

                          04 Mar, 2015               

 

                          [facility] [place] [facility]     [address] [id number] [place] [address]

                          04 Mar, 2015               

 

                          [facility] [place] [facility]     [address] [id number] [place] [address]

                          04 Mar, 2015               

 

                          [facility] [place] [facility]     [address] [id number] [place] [address]

                          04 Mar, 2015               

 

                          [facility] [place] [facility]     [address] [facility] [id number] [place] [address]

                          30 Dec, 2014               

 

                          [facility] [place] [facility]     [address] [id number] [place] [address]

                          30 Dec, 2014               

 

                          [facility] [place] [facility]     [address] [id number] [place] [address]

                          18 Dec, 2014               

 

                          [facility] [place] [facility]     [address] [id number] [place] [address]

                          18 Dec, 2014               

 

                          [facility] [place] [facility]     [address] [id number] [place] [address]

                          15 Dec, 2014               

 

                          [facility] [place] [facility]     [address] [id number] [place] [address]

                          12 Dec, 2014               

 

                          [facility] [place] [facility]     [address] [id number] [place] [address]

                          12 Dec, 2014               

 

                          [facility] [place] [facility]     [address] [id number] [place] [address]

                          24 Oct, 2014               

 

                          [facility] [place] [facility]     [address] [id number] [place] [address]

                          24 Oct, 2014               

 

                          [facility] [place] [facility]     [address] [id number] [place] [address]

                          21 Oct, 2014               

 

                          [facility] [place] [facility]     [address] [id number] [place] [address]

                          21 Oct, 2014               

 

                          [facility] [place] [facility]     [address] [id number] [place] [address]

                          16 Sep, 2014               

 

                          [facility] [place] [facility]     [address] [id number] [place] [address]

                          16 Sep, 2014               

 

                          [facility] [place] [facility]     [address] [id number] [place] [address]

                                         

 

                          [facility] [place] [facility]     [address] [id number] [place] [address]

                                         

 

                          [facility] [place] [facility]     [address] [id number] [place] [address]

                                         

 

                          [facility] [place] [facility]     [address] [id number] [place] [address]

                                         

 

                          [facility] [place] [facility]     [address] [id number] [place] [address]

                          10 [place], 2014               

 

                          [facility] [place] [facility]     [address] [id number] [place] [address]

                          10 [place], 2014               

 

                          [facility] [place] [facility]     [address] [id number] [place] [address]

                                         

 

                          [facility] [place] [facility]     [address] [id number] [place] [address]

                                         

 

                          [facility] [place] [facility]     [address] [id number] [place] [address]

                          15 Nov, 2013               

 

                          [facility] [place] [facility]     [address] [id number] [place] [address]

                                         

 

                          [facility] [place] [facility]     [address] [id number] [place] [address]

                                         

 

                          [facility] [place] [facility]     [address] [id number] [place] [address]

                                         

 

                          [facility] [place] [facility]     [address] [id number] [place] [address]

                                         

 

                          [facility] [place] [facility]     [address] [id number] [place] [address]

                                         

 

                          [facility] [place] [facility]     [address] [id number] [place] [address]

                                         

 

                          [facility] [place] [facility]     [address] [id number] [place] [address]

                          29 Oct, 2013               

 

                          [facility] [place] [facility]     [address] [id number] [place] [address]

                          29 Oct, 2013               

 

                          [facility] [place] [facility]     [address] [id number] [place] [address]

                          13 Aug, 2013               

 

                          [facility] [place] [facility]     [address] [id number] [place] [address]

                          23 May, 2013               

 

                          [facility] [place] [facility]     [address] [id number] [address]

                                         

 

                          [facility] [place] [facility]     [address] [id number] [place] [address]

                                         

 

                          [facility] [place] [facility]     [address] [id number] [place] [address]

                          12 Mar, 2013               

 

                          [facility] [place] [facility]     [address] [id number] [place] [address]

                          05 Mar, 2013               

 

                          [facility] [place] [facility]     [address] [id number] [place] [address]

                                         

 

                          [facility] [place] [facility]     [id number] [address]

                          07 2011               

 

                          [facility]     [id number] [address]

                                         

 

                          [facility]     [address] [address]

                          18 Dec, 2009               

 

                          [facility]     [id number] [address]

                                         

 

                          [facility]     [address] [address]

                          26 Oct, 2009               

 

                          [facility]     [address] [address]

                          23 Oct, 2009               

 

                          [facility]     [id number] [address]

                          14 Sep, 2009               







IMMUNIZATIONS

No Known Immunizations



SOCIAL HISTORY

Never Assessed



REASON FOR VISIT

Diabetes -Lito GAR , A1C, PHQ2, AUDIT C



PLAN OF CARE







                          Activity                  Details









                                         









                          Follow Up                 3 Months Reason:







VITAL SIGNS







                    Height              72 in               2018

 

                    Weight              168.2 lbs           2018

 

                    Temperature         97.8 degrees Fahrenheit    2018

 

                    Heart Rate          103 bpm             2018

 

                    Respiratory Rate    18                  2018

 

                    Oximetry            on room air:94 %    2018

 

                    BMI                 22.81 kg/m2         2018

 

                    Blood pressure systolic    115 mmHg            2018

 

                    Blood pressure diastolic    72 mmHg             2018







MEDICATIONS







        Medication    Instructions    Dosage    Frequency    Start Date    End Date    Duration    Status



 

        Comfort Assist Insulin Syringe 1 mL            FOUR TIMES A DAY                            25      Active

 

             Humalog 100 UNIT/ML    DX E10.42 per insulin pump    80 units daily                 24 Mar, 2015

                                                            Active

 

                          Blood Glucose Test -      Anni Contour Next and lancets DX E10.42 Test 8 times daily

 on insulin pump    test blood sugar               24 Aug, 2017                          Active

 

          Humalog 100              INJECT 80 UNITS UNDER THE SKIN DAILY PER INSULIN PUMP                                  

25                                      Active

 

             AgaMatrix Ultra-Thin Lancets 33                 USE ONE LANCET TO TEST FOUR TIMES A DAY                 

                                        25                  Active







RESULTS







                Name            Result          Date            Reference Range

 

                A1C (IN HOUSE)                    2018       

 

                A1C IN HOUSE    9.7                             4.3 - 5.6 %

 

                Previous A1c    9.6                              

 

                Lot             [id number]                             

 

                Exp date        2020                          







PROCEDURES







                Procedure       Date Ordered    Result          Body Site

 

                GLYCATED HEMOGLOBIN TEST    2018                     







INSTRUCTIONS





MEDICATIONS ADMINISTERED

No Known Medications



MEDICAL (GENERAL) HISTORY







                    Type                Description         Date

 

                    Medical History     Diabetes             

 

                    Surgical History    Left hip surgery    

 

                    Surgical History    tonsilectomy         

 

                    Hospitalization History     [facility]

## 2019-07-03 NOTE — XMS REPORT
[facility]

                             Created on: 2018



Dat Ruiz

External Reference #: [id number]

: 1990

Sex: Male



Demographics







                          Address                   [address] [place]  [address]

 

                          Preferred Language        Unknown

 

                          Marital Status            Unknown

 

                          Rastafari Affiliation     Unknown

 

                          Race                      Unknown

 

                          Ethnic Group              Unknown





Author







                          Author                    VERONICA MENDES

 

                          Organization              [facility]

 

                          Address                   [address] [place]  [address]



 

                          Phone                     [phone]







Care Team Providers







                    Care Team Member Name    Role                Phone

 

                    VERONICA MENDES     Unavailable         [phone]







PROBLEMS







          Type      Condition    ICD9-CM Code    SDC16-YU Code    Onset Dates    Condition Status    SNOMED

 Code

 

             Problem      Erectile dysfunction due to diseases classified elsewhere                 N52.1          

                          Active                    655328707

 

           Problem    Cigarette nicotine dependence without complication               F17.210               Active

                                        [id number]

 

                Problem         Diabetic polyneuropathy associated with type 1 diabetes mellitus                    E10.42

                                        Active              [id number]

 

          Problem    Diabetes              E11.9               Active    [id number]







ALLERGIES

No Information



ENCOUNTERS







                Encounter       Location        Date            Diagnosis

 

                          [facility]     [id number] [place] [address]

                          14 May, 2018               

 

                          [facility]     [id number] [place] [address]

                                         

 

                          [facility]     [id number] [place] [address]

                                         

 

                          [facility]     [id number] [place] [address]

                          20 Dec, 2017               

 

                          [facility]     [id number] [place] [address]

                          05 Dec, 2017               

 

                          [facility]     [id number] [place] [address]

                                         

 

                          [facility]     [id number] [place] [address]

                                         

 

                          [facility]     [id number] [place] [address]

                                        Diabetes E11.9

 

                          [facility]     [id number] [place] [address]

                          24 Aug, 2017              Type 1 diabetes mellitus with other neurologic complication E10.49



 

                          [facility]     [id number] [place] [address]

                          24 Aug, 2017               

 

                          [facility]     [id number] [place] [address]

                          04 Aug, 2017              Diabetes E11.9 ; Erectile dysfunction due to diseases classified 

elsewhere N52.1 ; Diabetic polyneuropathy associated with type 1 diabetes 
mellitus E10.42 and Cigarette nicotine dependence without complication F17.210

 

                          [facility]     [id number] [place] [address]

                                        Hip fracture, left, closed, with routine healing, subsequent encounter

 S72.002D and Allergy, initial encounter T78.40XA

 

                          [facility]     [address] [address]

                                         

 

                          [facility]     [address] [address]

                          30 May, 2017              Femur fracture, left S72.92XA

 

                          [place] [id number] [place] [address]

                          30 May, 2017              Femur fracture, left S72.92XA

 

                          [facility]     [id number] [place] [address]

                          16 May, 2017              Diabetes E11.9 and Femur fracture, left S72.92XA

 

                          [facility] [place]    [address] [id number]

                          09 May, 2017               

 

                          [facility]    [id number] [place] [address]

                          31 May, 2016               

 

                          [facility]     [id number] [place] [address]

                          31 May, 2016               

 

                          [facility]     [id number] [place] [address]

                                         

 

                          [facility]     [id number] [place] [address]

                                         

 

                          [facility] [place] [facility]     [address] [id number] [place] [address]

                          24 Mar,                

 

                          [facility] [place] [facility]     [address] [id number] [place] [address]

                          24 Mar, 2015               

 

                          [facility] [place] [facility]     [address] [id number] [place] [address]

                          04 Mar,                

 

                          [facility] [place] [facility]     [address] [id number] [place] [address]

                          04 Mar,                

 

                          [facility] [place] [facility]     [address] [id number] [place] [address]

                          04 Mar,                

 

                          [facility] [place] [facility]     [address] [id number] [place] [address]

                          04 Mar,                

 

                          [facility] [place] [facility]     [address] [id number] [place] [address]

                          30 Dec, 2014               

 

                          [facility] [place] [facility]     [address] [id number] [place] [address]

                          30 Dec, 2014               

 

                          [facility] [place] [facility]     [address] [id number] [place] [address]

                          18 Dec, 2014               

 

                          [facility] [place] [facility]     [address] [id number] [place] [address]

                          18 Dec, 2014               

 

                          [facility] [place] [facility]     [address] [id number] [place] [address]

                          15 Dec, 2014               

 

                          [facility] [place] [facility]     [address] [id number] [place] [address]

                          12 Dec, 2014               

 

                          [facility] [place] [facility]     [address] [id number] [place] [address]

                          12 Dec, 2014               

 

                          [facility] [place] [facility]     [address] [id number] [place] [address]

                          24 Oct, 2014               

 

                          [facility] [place] [facility]     [address] [id number] [place] [address]

                          24 Oct, 2014               

 

                          [facility] [place] [facility]     [address] [id number] [place] [address]

                          21 Oct, 2014               

 

                          [facility] [place] [facility]     [address] [id number] [place] [address]

                          21 Oct, 2014               

 

                          [facility] [place] [facility]     [address] [id number] [place] [address]

                          16 Sep, 2014               

 

                          [facility] [place] [facility]     [address] [id number] [place] [address]

                          16 Sep, 2014               

 

                          [facility] [place] [facility]     [address] [id number] [place] [address]

                          20 2014               

 

                          [facility] [place] [facility]     [address] [id number] [place] [address]

                                         

 

                          [facility] [place] [facility]     [address] [id number] [place] [address]

                          17 2014               

 

                          [facility] [place] [facility]     [address] [id number] [place] [address]

                                         

 

                          [facility] [place] [facility]     [address] [id number] [place] [address]

                          10 [place], 2014               

 

                          [facility] [place] [facility]     [address] [id number] [place] [address]

                          10 [place], 2014               

 

                          [facility] [place] [facility]     [address] [id number] [place] [address]

                                         

 

                          [facility] [place] [facility]     [address] [id number] [place] [address]

                                         

 

                          [facility] [place] [facility]     [address] [id number], [place] [address]

                          15 2013               

 

                          [facility] [place] [facility]     [address] [id number] [place] [address]

                          14 2013               

 

                          [facility] [place] [facility]     [address] [id number] [place] [address]

                          14 2013               

 

                          [facility] [place] [facility]     [address] [id number] [place] [address]

                          12 2013               

 

                          [facility] [place] [facility]     [address] [id number] [place] [address]

                                         

 

                          [facility]     [address] [id number] [place] [address]

                                         

 

                          [facility]     [address] [id number] [place] [address]

                                         

 

                          [facility]     [address] [id number] [place] [address]

                          29 Oct, 2013               

 

                          [facility]     [address] [id number] [place] [address]

                          29 Oct, 2013               

 

                          [facility]     [address] [id number] [place] [address]

                          13 Aug, 2013               

 

                          [facility]     [address] [id number] [place] [address]

                          23 May, 2013               

 

                          [facility]     [address] [id number] [place] [address]

                                         

 

                          [facility]     [address] [id number] [place] [address]

                                         

 

                          [facility]     [address] [id number] [place] [address]

                          12 Mar, 2013               

 

                          [facility]     [address] [id number] [place] [address]

                          05 Mar, 2013               

 

                          [facility]     [address] [id number] [place] [address]

                                         

 

                          [facility]     [address] [id number] [place] [address]

                                         

 

                          [facility]     [address] [id number] [place] [address]

                                         

 

                          [facility]     [address] [id number] [place] [address]

                          18 Dec, 2009               

 

                          [facility]     [address] [id number] [place] [address]

                                         

 

                          [facility]     [address] [facility] [id number] [place] [address]

                          26 Oct, 2009               

 

                          [facility]     [address] [facility] [id number] [place] [address]

                          23 Oct, 2009               

 

                          [facility]     [address] [facility] [id number] [place] [address]

                          14 Sep, 2009               







IMMUNIZATIONS

No Known Immunizations



SOCIAL HISTORY

Never Assessed



REASON FOR VISIT

insulin pump update



PLAN OF CARE





VITAL SIGNS





MEDICATIONS

Unknown Medications



RESULTS

No Results



PROCEDURES

No Known procedures



INSTRUCTIONS





MEDICATIONS ADMINISTERED

No Known Medications



MEDICAL (GENERAL) HISTORY







                    Type                Description         Date

 

                    Medical History     Diabetes             

 

                    Surgical History    Left hip surgery    

 

                    Surgical History    tonsilectomy         

 

                    Hospitalization History     [facility]

## 2019-07-03 NOTE — XMS REPORT
[facility]

                             Created on: 2019



Dat Ruiz

External Reference #: [id number]

: 1990

Sex: Male



Demographics







                          Address                   [address] [place]  [address]

 

                          Preferred Language        Unknown

 

                          Marital Status            Unknown

 

                          [employer] Affiliation     Unknown

 

                          Race                      Unknown

 

                          Ethnic Group              Unknown





Author







                          Author                    Migration,  Doctor

 

                          Organization              [facility]

 

                          Address                   Unknown

 

                          Phone                     Unavailable







Care Team Providers







                    Care Team Member Name    Role                Phone

 

                    Migration,  Doctor    Unavailable         Unavailable







PROBLEMS







          Type      Condition    ICD9-CM Code    CYV76-AX Code    Onset Dates    Condition Status    SNOMED

 Code

 

           Problem    Cigarette nicotine dependence without complication               F17.210               Active

                                        [id number]

 

             Problem      Erectile dysfunction due to diseases classified elsewhere                 N52.1          

                          Active                    [id number]

 

          Problem    Diabetes              E11.9               Active    [id number]

 

                Problem         Diabetic polyneuropathy associated with type 1 diabetes mellitus                    E10.42

                                        Active              [id number]







ALLERGIES

No Information



ENCOUNTERS







                Encounter       Location        Date            Diagnosis

 

                          [place] [id number] [place] [address]

                          05 Mar, 2019               

 

                          [facility]     [id number] [place] [address]

                                         

 

                          [facility]     [id number] [place] [address]

                                         

 

                          [facility]     [id number] [place] [address]

                          28 Dec, 2018              Diabetes E11.9

 

                          [facility]     [id number] [place] [address]

                          25 Oct, 2018               

 

                          [facility]     [id number] [place] [address]

                          22 Oct, 2018               

 

                          [facility]     [id number] [place] [address]

                          19 Oct, 2018              Diabetic polyneuropathy associated with type 1 diabetes mellitus 

E10.42

 

                          [facility]     [id number] [place] [address]

                          15 Oct, 2018              Diabetes E11.9

 

                          [facility]     [id number] [place] [address]

                                         

 

                          [facility]     [id number] [address]

                                        Diabetes E11.9

 

                          [facility]     [id number] [place] [address]

                                         

 

                          [facility]     [id number] [address]

                          14 May, 2018               

 

                          [facility]     [id number] [address]

                                         

 

                          [facility]     [id number] [address]

                                         

 

                          [facility]     [id number] [address]

                          20 Dec, 2017               

 

                          [facility]     [id number] [address]

                          05 Dec, 2017               

 

                          [facility]     [id number] [address]

                                         

 

                          [facility]     [id number] [place] [address]

                                         

 

                          [facility]     [id number] [address]

                                        Diabetes E11.9

 

                          [facility]     [id number] [address]

                          24 Aug, 2017              Type 1 diabetes mellitus with other neurologic complication E10.49



 

                          [facility]     [id number] [address]

                          24 Aug, 2017               

 

                          [facility]     [id number] [address]

                          04 Aug, 2017              Diabetes E11.9 ; Erectile dysfunction due to diseases classified 

elsewhere N52.1 ; Diabetic polyneuropathy associated with type 1 diabetes 
mellitus E10.42 and Cigarette nicotine dependence without complication F17.210

 

                          [facility]     [id number] [address]

                                        Hip fracture, left, closed, with routine healing, subsequent encounter

 S72.002D and Allergy, initial encounter T78.40XA

 

                          [facility]     [address] [address]

                                         

 

                          [facility]     [id number] [place] [address]

                          30 May, 2017              Femur fracture, left S72.92XA

 

                          [facility]     [address] [address]

                          30 May, 2017              Femur fracture, left S72.92XA

 

                          [facility]     [address] [address]

                          16 May, 2017              Diabetes E11.9 and Femur fracture, left S72.92XA

 

                          [facility]    [address] [id number]

                          09 May, 2017               

 

                          [facility]    [id number] [place] [address]

                          31 May, 2016               

 

                          [facility]     [address] [address]

                          31 May, 2016               

 

                          [facility]     [id number] [place] [address]

                                         

 

                          [facility]     [id number] [place] [address]

                                         

 

                          [facility]     [id number] [place] [address]

                          24 Mar, 2015               

 

                          [facility]     [id number] [place] [address]

                          24 Mar, 2015               

 

                          [facility]     [address] [address]

                          04 Mar, 2015               

 

                          [facility]     [id number] [place] [address]

                          04 Mar, 2015               

 

                          [facility]     [address] [address]

                          04 Mar, 2015               

 

                          [facility] [place] [facility]     [address] [id number] [place] [address]

                          04 Mar, 2015               

 

                          [facility] [place] [facility]     [address] [id number] [place] [address]

                          30 Dec, 2014               

 

                          [facility] [place] [facility]     [address] [id number] [place] [address]

                          30 Dec, 2014               

 

                          [facility] [place] [facility]     [address] [id number] [place] [address]

                          18 Dec, 2014               

 

                          [facility] [place] [facility]     [address] [id number] [place] [address]

                          18 Dec, 2014               

 

                          [facility] [place] [facility]     [address] [id number] [place] [address]

                          15 Dec, 2014               

 

                          [facility] [place] [facility]     [address] [id number] [place] [address]

                          12 Dec, 2014               

 

                          [facility] [place] [facility]     [address] [id number] [place] [address]

                          12 Dec, 2014               

 

                          [facility] [place] [facility]     [address] [id number] [place] [address]

                          24 Oct, 2014               

 

                          [facility] [place] [facility]     [address] [id number] [place] [address]

                          24 Oct, 2014               

 

                          [facility] [place] [facility]     [address] [id number] [place] [address]

                          21 Oct, 2014               

 

                          [facility] [place] [facility]     [address] [id number] [address]

                          21 Oct, 2014               

 

                          [facility] [place] [facility]     [address] [id number] [place] [address]

                          16 Sep, 2014               

 

                          [facility] [place] [facility]     [address] [id number] [place] [address]

                          16 Sep, 2014               

 

                          [facility] [place] [facility]     [address] [id number] [place] [address]

                                         

 

                          [facility] [place] [facility]     [address] [id number] [place] [address]

                          20 2014               

 

                          [facility]     [address] [id number] [place] [address]

                                         

 

                          [facility] [place] [facility]     [address] [id number] [place] [address]

                          17 2014               

 

                          [facility] [place] [facility]     [address] [id number] [place] [address]

                          10 [place], 2014               

 

                          [facility] [place] [facility]     [address] [id number] [place] [address]

                          10 [place], 2014               

 

                          [facility] [place] [facility]     [address] [id number] [place] [address]

                                         

 

                          [facility] [place] [facility]     [address] [id number] [place] [address]

                                         

 

                          [facility] [place] [facility]     [address] [id number] [place] [address]

                          15 2013               

 

                          [facility] [place] [facility]     [address] [id number] [place] [address]

                                         

 

                          [facility] [place] [facility]     [address] [id number] [place] [address]

                          14 2013               

 

                          [facility] [place] [facility]     [address] [facility] [id number] [place] [address]

                                         

 

                          [facility] [place] [facility]     [address] [id number] [place] [address]

                                         

 

                          [facility] [place] [facility]     [address] [id number] [place] [address]

                                         

 

                          [facility] [place] [facility]     [address] [id number] [place] [address]

                                         

 

                          [facility] [place] [facility]     [address] [facility] [id number] [place] [address]

                          29 Oct, 2013               

 

                          [facility] [place] [facility]     [address] [id number] [place] [address]

                          29 Oct, 2013               

 

                          [facility]     [id number] [address]

                          13 Aug, 2013               

 

                          [facility]     [address] [address]

                          23 May, 2013               

 

                          [facility]     [id number] [address]

                                         

 

                          [facility]     [id number] [address]

                                         

 

                          [facility]     [address] [address]

                          12 Mar, 2013               

 

                          [facility]     [id number] [address]

                          05 Mar, 2013               

 

                          [facility]     [id number] [address]

                                         

 

                          [facility]     [id number] [address]

                                         

 

                          [facility]     [id number] [address]

                                         

 

                          [facility]     [id number] [address]

                          18 Dec, 2009               

 

                          [facility]     [id number] [address]

                                         

 

                          [facility]     [id number] [address]

                          26 Oct, 2009               

 

                          [facility]     [id number] [address]

                          23 Oct, 2009               

 

                          [facility]     [id number] [address]

                          14 Sep, 2009               







IMMUNIZATIONS

No Known Immunizations



SOCIAL HISTORY

Never Assessed



REASON FOR VISIT

[facility]



PLAN OF CARE





VITAL SIGNS





MEDICATIONS

Unknown Medications



RESULTS

No Results



PROCEDURES

No Known procedures



INSTRUCTIONS





MEDICATIONS ADMINISTERED

No Known Medications



MEDICAL (GENERAL) HISTORY







                    Type                Description         Date

 

                    Medical History     Diabetes             

 

                    Surgical History    Left hip surgery    

 

                    Surgical History    tonsilectomy         

 

                    Hospitalization History    [facility]

## 2019-07-03 NOTE — XMS REPORT
[facility]

                             Created on: 2019



Dat Ruiz

External Reference #: [id number]

: 1990

Sex: Male



Demographics







                          Address                   [address] [place]  [address]

 

                          Preferred Language        Unknown

 

                          Marital Status            Unknown

 

                          Zoroastrianism Affiliation     Unknown

 

                          Race                      Unknown

 

                          Ethnic Group              Unknown





Author







                          Author                    Migration,  Doctor

 

                          Organization              [facility]

 

                          Address                   Unknown

 

                          Phone                     Unavailable







Care Team Providers







                    Care Team Member Name    Role                Phone

 

                    Migration,  Doctor    Unavailable         Unavailable







PROBLEMS







          Type      Condition    ICD9-CM Code    CDG99-OS Code    Onset Dates    Condition Status    SNOMED

 Code

 

           Problem    Cigarette nicotine dependence without complication               F17.210               Active

                                        [id number]

 

             Problem      Erectile dysfunction due to diseases classified elsewhere                 N52.1          

                          Active                    [id number]

 

          Problem    Diabetes              E11.9               Active    [id number]

 

                Problem         Diabetic polyneuropathy associated with type 1 diabetes mellitus                    E10.42

                                        Active              [id number]







ALLERGIES

No Information



ENCOUNTERS







                Encounter       Location        Date            Diagnosis

 

                          [place] [id number] [place] [address]

                          05 Mar, 2019               

 

                          [facility]     [id number] [place] [address]

                                         

 

                          [facility]     [id number] [place] [address]

                                         

 

                          [facility]     [id number] [place] [address]

                          28 Dec, 2018              Diabetes E11.9

 

                          [facility]     [id number] [place] [address]

                          25 Oct, 2018               

 

                          [facility]     [id number] [place] [address]

                          22 Oct, 2018               

 

                          [facility]     [id number] [place] [address]

                          19 Oct, 2018              Diabetic polyneuropathy associated with type 1 diabetes mellitus 

E10.42

 

                          [facility]     [id number] [place] [address]

                          15 Oct, 2018              Diabetes E11.9

 

                          [facility]     [id number] [place] [address]

                                         

 

                          [facility]     [id number], [place] [address]

                                        Diabetes E11.9

 

                          [facility]     [id number] [place] [address]

                                         

 

                          [facility]     [id number] [address]

                          14 May, 2018               

 

                          [facility]     [id number] [place] [address]

                                         

 

                          [facility]     [id number] [place] [address]

                                         

 

                          [facility]     [id number] [place] [address]

                          20 Dec, 2017               

 

                          [facility]     [id number] [place] [address]

                          05 Dec, 2017               

 

                          [facility]     [id number] [address]

                                         

 

                          [facility]     [id number] [place] [address]

                                         

 

                          [facility]     [id number] [address]

                                        Diabetes E11.9

 

                          [facility]     [id number] [address]

                          24 Aug, 2017              Type 1 diabetes mellitus with other neurologic complication E10.49



 

                          [facility]     [id number] [address]

                          24 Aug, 2017               

 

                          [facility]     [id number] [address]

                          04 Aug, 2017              Diabetes E11.9 ; Erectile dysfunction due to diseases classified 

elsewhere N52.1 ; Diabetic polyneuropathy associated with type 1 diabetes 
mellitus E10.42 and Cigarette nicotine dependence without complication F17.210

 

                          [facility]     [id number] [address]

                                        Hip fracture, left, closed, with routine healing, subsequent encounter

 S72.002D and Allergy, initial encounter T78.40XA

 

                          [facility]     [address] [address]

                                         

 

                          [facility]     [id number] [place] [address]

                          30 May, 2017              Femur fracture, left S72.92XA

 

                          [facility]     [address] [address]

                          30 May, 2017              Femur fracture, left S72.92XA

 

                          [facility]     [address] [address]

                          16 May, 2017              Diabetes E11.9 and Femur fracture, left S72.92XA

 

                          [facility]    [address] [id number]

                          09 May, 2017               

 

                          [facility]    [id number] [place] [address]

                          31 May, 2016               

 

                          [facility]     [address] [address]

                          31 May, 2016               

 

                          [facility]     [id number] [place] [address]

                                         

 

                          [facility]     [id number] [place] [address]

                                         

 

                          [facility]     [id number] [place] [address]

                          24 Mar, 2015               

 

                          [facility]     [id number] [place] [address]

                          24 Mar, 2015               

 

                          [facility]     [address] [address]

                          04 Mar, 2015               

 

                          [facility]     [id number] [place] [address]

                          04 Mar, 2015               

 

                          [facility]     [address] [address]

                          04 Mar, 2015               

 

                          [facility] [place] [facility]     [address] [id number] [place] [address]

                          04 Mar, 2015               

 

                          [facility] [place] [facility]     [address] [id number] [place] [address]

                          30 Dec, 2014               

 

                          [facility] [place] [facility]     [address] [id number] [place] [address]

                          30 Dec, 2014               

 

                          [facility] [place] [facility]     [address] [id number] [place] [address]

                          18 Dec, 2014               

 

                          [facility] [place] [facility]     [address] [id number] [place] [address]

                          18 Dec, 2014               

 

                          [facility] [place] [facility]     [address] [id number] [place] [address]

                          15 Dec, 2014               

 

                          [facility] [place] [facility]     [address] [id number] [place] [address]

                          12 Dec, 2014               

 

                          [facility] [place] [facility]     [address] [id number] [place] [address]

                          12 Dec, 2014               

 

                          [facility] [place] [facility]     [address] [id number] [place] [address]

                          24 Oct, 2014               

 

                          [facility] [place] [facility]     [address] [id number] [place] [address]

                          24 Oct, 2014               

 

                          [facility] [place] [facility]     [address] [id number] [place] [address]

                          21 Oct, 2014               

 

                          [facility] [place] [facility]     [address] [id number] [address]

                          21 Oct, 2014               

 

                          [facility] [place] [facility]     [address] [id number] [place] [address]

                          16 Sep, 2014               

 

                          [facility] [place] [facility]     [address] [id number] [place] [address]

                          16 Sep, 2014               

 

                          [facility] [place] [facility]     [address] [id number] [place] [address]

                                         

 

                          [facility] [place] [facility]     [address] [id number] [place] [address]

                          20 2014               

 

                          [facility]     [address] [id number] [place] [address]

                                         

 

                          [facility] [place] [facility]     [address] [id number] [place] [address]

                          17 2014               

 

                          [facility] [place] [facility]     [address] [id number] [place] [address]

                          10 [place], 2014               

 

                          [facility] [place] [facility]     [address] [id number] [place] [address]

                          10 [place], 2014               

 

                          [facility] [place] [facility]     [address] [id number] [place] [address]

                                         

 

                          [facility] [place] [facility]     [address] [id number] [place] [address]

                                         

 

                          [facility] [place] [facility]     [address] [id number] [place] [address]

                          15 2013               

 

                          [facility] [place] [facility]     [address] [id number] [place] [address]

                                         

 

                          [facility] [place] [facility]     [address] [id number] [place] [address]

                          14 2013               

 

                          [facility] [place] [facility]     [address] [facility] [id number] [place] [address]

                                         

 

                          [facility] [place] [facility]     [address] [id number] [place] [address]

                                         

 

                          [facility] [place] [facility]     [address] [id number] [place] [address]

                                         

 

                          [facility] [place] [facility]     [address] [id number] [place] [address]

                                         

 

                          [facility] [place] [facility]     [address] [facility] [id number] [place] [address]

                          29 Oct, 2013               

 

                          [facility] [place] [facility]     [address] [id number] [place] [address]

                          29 Oct, 2013               

 

                          [facility]     [id number] [address]

                          13 Aug, 2013               

 

                          [facility]     [address] [facility] [id number] [address]

                          23 May, 2013               

 

                          [facility]     [id number], [place] [address]

                                         

 

                          [facility]     [address] [facility] [id number] [address]

                                         

 

                          [facility]     [address] [facility] [id number] [address]

                          12 Mar, 2013               

 

                          [facility]     [id number], [place] [address]

                          05 Mar, 2013               

 

                          [facility]     [id number], [place] [address]

                                         

 

                          [facility]     [id number], [place] [address]

                                         

 

                          [facility]     [id number], [place] [address]

                                         

 

                          [facility]     [id number], [place] [address]

                          18 Dec, 2009               

 

                          [facility]     [id number], [place] [address]

                                         

 

                          [facility]     [address], [place] [address]

                          26 Oct, 2009               

 

                          [facility]     [id number], [place] [address]

                          23 Oct, 2009               

 

                          [facility]     [id number], [place] [address]

                          14 Sep, 2009               







IMMUNIZATIONS

No Known Immunizations



SOCIAL HISTORY

Never Assessed



REASON FOR VISIT

[facility]



PLAN OF CARE





VITAL SIGNS





MEDICATIONS







        Medication    Instructions    Dosage    Frequency    Start Date    End Date    Duration    Status



 

             Flexeril 5 mg                 1 tablet by Oral route 3 times per day PRN for tremors                 21 

Oct, 2014                                                   Active

 

        Lancets -            4 times per day ONE TOUCH DELICA LANCETS                                Active



 

             Neurontin 100 mg                 1 capsule by Oral route 3 times per day PRN for pain                 21

 Oct, 2014                                                  Active

 

                    Levemir 100 unit/mL                        inject 45 Unit by Subcutaneous route 1 time per day At bedtime

                          24 Mar, 2015                              Active

 

                    Humalog 100 unit/mL                        inject 15 Units by Subcutaneous route 3 times per day before

 meals                    24 Mar, 2015                              Active







RESULTS

No Results



PROCEDURES

No Known procedures



INSTRUCTIONS





MEDICATIONS ADMINISTERED

No Known Medications



MEDICAL (GENERAL) HISTORY







                    Type                Description         Date

 

                    Medical History     Diabetes             

 

                    Surgical History    Left hip surgery    

 

                    Surgical History    tonsilectomy         

 

                    Hospitalization History     [facility]

## 2019-07-03 NOTE — XMS REPORT
[facility]

                             Created on: 2018



Dat Ruiz

External Reference #: [id number]

: 1990

Sex: Male



Demographics







                          Address                   [address] [place]  [address]

 

                          Preferred Language        Unknown

 

                          Marital Status            Unknown

 

                          Temple Affiliation     Unknown

 

                          Race                      Unknown

 

                          Ethnic Group              Unknown





Author







                          Author                    VERONICA MENDES

 

                          Organization              [facility]

 

                          Address                   [address] [place]  [address]



 

                          Phone                     [phone]







Care Team Providers







                    Care Team Member Name    Role                Phone

 

                    VERONICA MENDES     Unavailable         [phone]







PROBLEMS







          Type      Condition    ICD9-CM Code    KMP71-RR Code    Onset Dates    Condition Status    SNOMED

 Code

 

             Problem      Erectile dysfunction due to diseases classified elsewhere                 N52.1          

                          Active                    298987337

 

           Problem    Cigarette nicotine dependence without complication               F17.210               Active

                                        [id number]

 

                Problem         Diabetic polyneuropathy associated with type 1 diabetes mellitus                    E10.42

                                        Active              [id number]

 

          Problem    Diabetes              E11.9               Active    [id number]







ALLERGIES

No Information



ENCOUNTERS







                Encounter       Location        Date            Diagnosis

 

                          [facility]     [id number] [place] [address]

                          14 May, 2018               

 

                          [facility]     [id number] [place] [address]

                                         

 

                          [facility]     [id number] [place] [address]

                                         

 

                          [facility]     [id number] [place] [address]

                          20 Dec, 2017               

 

                          [facility]     [id number] [place] [address]

                          05 Dec, 2017               

 

                          [facility]     [id number] [place] [address]

                                         

 

                          [facility]     [id number] [place] [address]

                                         

 

                          [facility]     [id number] [place] [address]

                                        Diabetes E11.9

 

                          [facility]     [id number] [place] [address]

                          24 Aug, 2017              Type 1 diabetes mellitus with other neurologic complication E10.49



 

                          [facility]     [id number] [place] [address]

                          24 Aug, 2017               

 

                          [facility]     [id number] [place] [address]

                          04 Aug, 2017              Diabetes E11.9 ; Erectile dysfunction due to diseases classified 

elsewhere N52.1 ; Diabetic polyneuropathy associated with type 1 diabetes 
mellitus E10.42 and Cigarette nicotine dependence without complication F17.210

 

                          [facility]     [id number] [place] [address]

                                        Hip fracture, left, closed, with routine healing, subsequent encounter

 S72.002D and Allergy, initial encounter T78.40XA

 

                          [facility]     [address] [address]

                                         

 

                          [facility]     [address] [address]

                          30 May, 2017              Femur fracture, left S72.92XA

 

                          [address] [id number] [place] [address]

                          30 May, 2017              Femur fracture, left S72.92XA

 

                          [facility]     [id number] [place] [address]

                          16 May, 2017              Diabetes E11.9 and Femur fracture, left S72.92XA

 

                          [facility]    [address] [id number]

                          09 May, 2017               

 

                          [facility]    [id number] [place] [address]

                          31 May, 2016               

 

                          [facility]     [id number] [place] [address]

                          31 May, 2016               

 

                          [facility]     [id number] [place] [address]

                                         

 

                          [facility]     [id number] [place] [address]

                                         

 

                          [facility] [place] [facility]     [address] [id number] [place] [address]

                          24 Mar,                

 

                          [facility] [place] [facility]     [address] [id number] [place] [address]

                          24 Mar, 2015               

 

                          [facility] [place] [facility]     [address] [id number] [place] [address]

                          04 Mar,                

 

                          [facility] [place] [facility]     [address] [id number] [place] [address]

                          04 Mar,                

 

                          [facility] [place] [facility]     [address] [id number] [place] [address]

                          04 Mar,                

 

                          [facility] [place] [facility]     [address] [id number] [place] [address]

                          04 Mar,                

 

                          [facility] [place] [facility]     [address] [id number] [place] [address]

                          30 Dec, 2014               

 

                          [facility] [place] [facility]     [address] [id number] [place] [address]

                          30 Dec, 2014               

 

                          [facility] [place] [facility]     [address] [id number] [place] [address]

                          18 Dec, 2014               

 

                          [facility] [place] [facility]     [address] [id number] [place] [address]

                          18 Dec, 2014               

 

                          [facility] [place] [facility]     [address] [id number] [place] [address]

                          15 Dec, 2014               

 

                          [facility] [place] [facility]     [address] [id number] [place] [address]

                          12 Dec, 2014               

 

                          [facility] [place] [facility]     [address] [id number] [place] [address]

                          12 Dec, 2014               

 

                          [facility] [place] [facility]     [address] [id number] [place] [address]

                          24 Oct, 2014               

 

                          [facility] [place] [facility]     [address] [id number] [place] [address]

                          24 Oct, 2014               

 

                          [facility] [place] [facility]     [address] [id number] [place] [address]

                          21 Oct, 2014               

 

                          [facility] [place] [facility]     [address] [id number] [place] [address]

                          21 Oct, 2014               

 

                          [facility] [place] [facility]     [address] [id number] [place] [address]

                          16 Sep, 2014               

 

                          [facility] [place] [facility]     [address] [id number] [place] [address]

                          16 Sep, 2014               

 

                          [facility] [place] [facility]     [address] [id number] [place] [address]

                          20 2014               

 

                          [facility] [place] [facility]     [address] [id number] [place] [address]

                                         

 

                          [facility] [place] [facility]     [address] [id number] [place] [address]

                          17 2014               

 

                          [facility] [place] [facility]     [address] [id number] [place] [address]

                                         

 

                          [facility] [place] [facility]     [address] [id number] [place] [address]

                          10 [place], 2014               

 

                          [facility] [place] [facility]     [address] [id number] [place] [address]

                          [address] [place], 2014               

 

                          [facility] [place] [facility]     [address] [id number] [place] [address]

                                         

 

                          [facility] [place] [facility]     [address] [id number] [place] [address]

                                         

 

                          [facility] [place] [facility]     [address] [id number] [place] [address]

                          15 2013               

 

                          [facility] [place] [facility]     [address] [id number] [place] [address]

                          14 2013               

 

                          [facility] [place] [facility]     [address] [id number] [place] [address]

                          14 2013               

 

                          [facility] [place] [facility]     [address] [id number] [place] [address]

                          12 2013               

 

                          [facility] [place] [facility]     [address] [id number] [place] [address]

                                         

 

                          [facility]     [address] [id number] [place] [address]

                                         

 

                          [facility]     [address] [id number] [place] [address]

                                         

 

                          [facility]     [address] [id number] [place] [address]

                          29 Oct, 2013               

 

                          [facility]     [address] [id number] [place] [address]

                          29 Oct, 2013               

 

                          [facility]     [address] [id number] [place] [address]

                          13 Aug, 2013               

 

                          [facility]     [address] [id number] [place] [address]

                          23 May, 2013               

 

                          [facility]     [address] [id number] [place] [address]

                                         

 

                          [facility]     [address] [id number] [place] [address]

                                         

 

                          [facility]     [address] [id number] [place] [address]

                          12 Mar, 2013               

 

                          [facility]     [address] [id number] [place] [address]

                          05 Mar, 2013               

 

                          [facility]     [address] [id number] [place] [address]

                                         

 

                          [facility]     [address] [id number] [place] [address]

                                         

 

                          [facility]     [address] [id number] [place] [address]

                                         

 

                          [facility]     [address] [id number] [place] [address]

                          18 Dec, 2009               

 

                          [facility]     [address] [id number] [place] [address]

                                         

 

                          [facility]     [address] [facility] [id number] [place] [address]

                          26 Oct, 2009               

 

                          [facility]     [address] [facility] [id number] [place] [address]

                          23 Oct, 2009               

 

                          [facility]     [address] [facility] [id number] [place] [address]

                          14 Sep, 2009               







IMMUNIZATIONS

No Known Immunizations



SOCIAL HISTORY

Never Assessed



REASON FOR VISIT

upload insulin pump



PLAN OF CARE





VITAL SIGNS





MEDICATIONS

Unknown Medications



RESULTS

No Results



PROCEDURES

No Known procedures



INSTRUCTIONS





MEDICATIONS ADMINISTERED

No Known Medications



MEDICAL (GENERAL) HISTORY







                    Type                Description         Date

 

                    Medical History     Diabetes             

 

                    Surgical History    Left hip surgery    

 

                    Surgical History    tonsilectomy         

 

                    H[facility]ization History     [facility]

## 2019-07-03 NOTE — XMS REPORT
[facility]

                             Created on: 2018



Dat Ruiz

External Reference #: [id number]

: 1990

Sex: Male



Demographics







                          Address                   [address] [place]  [address]

 

                          Preferred Language        Unknown

 

                          Marital Status            Unknown

 

                          Hoahaoism Affiliation     Unknown

 

                          Race                      Unknown

 

                          Ethnic Group              Unknown





Author







                          Author                    VERONICA MENDES

 

                          [facility]

 

                          Address                   [address] [place]  [address]



 

                          Phone                     [phone]







Care Team Providers







                    Care Team Member Name    Role                Phone

 

                    VERONICA MENDES     Unavailable         [phone]







PROBLEMS







          Type      Condition    ICD9-CM Code    VUH85-HC Code    Onset Dates    Condition Status    SNOMED

 Code

 

             Problem      Erectile dysfunction due to diseases classified elsewhere                 N52.1          

                          Active                    409254497

 

           Problem    Cigarette nicotine dependence without complication               F17.210               Active

                                        [id number]

 

                Problem         Diabetic polyneuropathy associated with type 1 diabetes mellitus                    E10.42

                                        Active              [id number]

 

          Problem    Diabetes              E11.9               Active    809694142







ALLERGIES

No Information



ENCOUNTERS







                Encounter       Location        Date            Diagnosis

 

                          [facility]     [id number] [place] [address]

                                         

 

                          [facility]     [id number] [place] [address]

                                        Diabetes E11.9

 

                          [facility]     [id number] [place] [address]

                                         

 

                          [facility]     [id number] [place] [address]

                          14 May, 2018               

 

                          [facility]     [id number] [place] [address]

                                         

 

                          [facility]     [id number] [place] [address]

                                         

 

                          [facility]     [id number] [place] [address]

                          20 Dec, 2017               

 

                          [facility]     [id number] [place] [address]

                          05 Dec, 2017               

 

                          [facility]     [id number] [place] [address]

                                         

 

                          [facility]     [id number] [place] [address]

                                         

 

                          [facility]     [id number] [place] [address]

                                        Diabetes E11.9

 

                          [place] [id number] [place] [address]

                          24 Aug, 2017              Type 1 diabetes mellitus with other neurologic complication E10.49



 

                          [facility]     [id number] [place] [address]

                          24 Aug, 2017               

 

                          [place] [address] [address]

                          04 Aug, 2017              Diabetes E11.9 ; Erectile dysfunction due to diseases classified 

elsewhere N52.1 ; Diabetic polyneuropathy associated with type 1 diabetes 
mellitus E10.42 and Cigarette nicotine dependence without complication F17.210

 

                          [place] [id number] [place] [address]

                                        Hip fracture, left, closed, with routine healing, subsequent encounter

 S72.002D and Allergy, initial encounter T78.40XA

 

                          [place] [id number] [place] [address]

                                         

 

                          [place] [id number] [place] [address]

                          30 May, 2017              Femur fracture, left S72.92XA

 

                          [place] [id number] [place] [address]

                          30 May, 2017              Femur fracture, left S72.92XA

 

                          [place] [id number] [place] [address]

                          16 May, 2017              Diabetes E11.9 and Femur fracture, left S72.92XA

 

                          [facility]    [id number] [place] [id number]

                          09 May, 2017               

 

                          [facility]    [id number] [place] [address]

                          31 May, 2016               

 

                          [facility] [place] [facility]     [address] [id number] [place] [address]

                          31 May, 2016               

 

                          [facility] [place] [facility]     [address] [id number] [place] [address]

                          14 2015               

 

                          [facility] [place] [facility]     [address] [id number] [place] [address]

                                         

 

                          [facility] [place] [facility]     [address] [id number] [place] [address]

                          24 Mar, 2015               

 

                          [facility] [place] [facility]     [address] [id number] [place] [address]

                          24 Mar, 2015               

 

                          [facility] [place] [facility]     [address] [id number] [place] [address]

                          04 Mar, 2015               

 

                          [facility] [place] [facility]     [address] [id number] [place] [address]

                          04 Mar, 2015               

 

                          [facility] [place] [facility]     [address] [id number] [place] [address]

                          04 Mar, 2015               

 

                          [facility] [place] [facility]     [address] [id number] [place] [address]

                          04 Mar, 2015               

 

                          [facility] [place] [facility]     [address] [id number] [place] [address]

                          30 Dec, 2014               

 

                          [facility] [place] [facility]     [address] [id number] [place] [address]

                          30 Dec, 2014               

 

                          [facility] [place] [facility]     [address] [id number] [place] [address]

                          18 Dec, 2014               

 

                          [facility] [place] [facility]     [address] [id number] [place] [address]

                          18 Dec, 2014               

 

                          [facility] [place] [facility]     [address] [id number] [place] [address]

                          15 Dec, 2014               

 

                          [facility] [place] [facility]     [address] [id number] [place] [address]

                          12 Dec, 2014               

 

                          [facility] [place] [facility]     [address] [id number] [address]

                          12 Dec, 2014               

 

                          [facility] [place] [facility]     [address] [id number] [place] [address]

                          24 Oct, 2014               

 

                          [facility] [place] [facility]     [address] [id number] [place] [address]

                          24 Oct, 2014               

 

                          [facility] [place] [facility]     [address] [id number] [place] [address]

                          21 Oct, 2014               

 

                          [facility] [place] [facility]     [address] [id number] [place] [address]

                          21 Oct, 2014               

 

                          [facility] [place] [facility]     [address] [id number] [place] [address]

                          16 Sep, 2014               

 

                          [facility] [place] [facility]     [address] [id number] [place] [address]

                          16 Sep, 2014               

 

                          [facility] [place] [facility]     [address] [id number] [place] [address]

                                         

 

                          [facility] [place] [facility]     [address] [id number] [place] [address]

                                         

 

                          [facility] [place] [facility]     [address] [facility] [id number] [place] [address]

                                         

 

                          [facility] [place] [facility]     [address] [facility] [id number] [place] [address]

                                         

 

                          [facility] [place] [facility]     [address] [id number] [place] [address]

                          10 [place], 2014               

 

                          [facility] [place] [facility]     [address] [id number] [place] [address]

                          10 [place], 2014               

 

                          [facility] [place] [facility]     [address] [id number] [place] [address]

                                         

 

                          [facility] [place] [facility]     [address] [id number] [place] [address]

                                         

 

                          [facility] [place] [facility]     [address] [facility] [id number] [place] [address]

                          15 2013               

 

                          [facility] [place] [facility]     [address] [id number] [place] [address]

                          14 2013               

 

                          [facility]     [address] [id number] [place] [address]

                          14 2013               

 

                          [facility] [place] [facility]     [address] [id number] [place] [address]

                                         

 

                          [facility] [place] [facility]     [address] [id number] [place] [address]

                                         

 

                          [facility] [place] [facility]     [address] [id number] [place] [address]

                                         

 

                          [facility] [place] [facility]     [address] [id number] [place] [address]

                                         

 

                          [facility] [place] [facility]     [address] [id number] [place] [address]

                          29 Oct, 2013               

 

                          [facility] [place] [facility]     [address] [id number] [place] [address]

                          29 Oct, 2013               

 

                          [facility] [place] [facility]     [address] [id number] [place] [address]

                          13 Aug, 2013               

 

                          [facility] [place] [facility]     [address] [id number] [place] [address]

                          23 May, 2013               

 

                          [facility] [place] [facility]     [address] [id number] [place] [address]

                                         

 

                          [facility] [place] [facility]     [address] [id number] [place] [address]

                                         

 

                          [facility] [place] [facility]     [address] [id number] [place] [address]

                          12 Mar, 2013               

 

                          [facility] [place] [facility]     [address] [id number] [place] [address]

                          05 Mar, 2013               

 

                          [facility] [place] [facility]     [address] [id number] [place] [address]

                          16 2012               

 

                          [facility] [place] [facility]     [address] [id number] [place] [address]

                                         

 

                          [facility]     [id number] [address]

                                         

 

                          [facility]     [id number] [address]

                          18 Dec, 2009               

 

                          [facility]     [id number] [address]

                                         

 

                          [facility]     [id number] [address]

                          26 Oct, 2009               

 

                          [facility]     [id number] [address]

                          23 Oct, 2009               

 

                          [facility]     [id number] [address]

                          14 Sep, 2009               







IMMUNIZATIONS

No Known Immunizations



SOCIAL HISTORY

Never Assessed



REASON FOR VISIT

Upload request to pt.



PLAN OF CARE





VITAL SIGNS





MEDICATIONS

Unknown Medications



RESULTS

No Results



PROCEDURES

No Known procedures



INSTRUCTIONS





MEDICATIONS ADMINISTERED

No Known Medications



MEDICAL (GENERAL) HISTORY







                    Type                Description         Date

 

                    Medical History     Diabetes             

 

                    Surgical History    Left hip surgery    

 

                    Surgical History    tonsilectomy         

 

                    Hospitalization History     [facility]

## 2019-07-03 NOTE — XMS REPORT
[facility]

                             Created on: 2019



Dat Ruiz

External Reference #: [id number]

: 1990

Sex: Male



Demographics







                          Address                   [address] [place]  [address]

 

                          Preferred Language        Unknown

 

                          Marital Status            Unknown

 

                          Judaism Affiliation     Unknown

 

                          Race                      Unknown

 

                          Ethnic Group              Unknown





Author







                          Author                    Migration,  Doctor

 

                          Organization              [facility]

 

                          Address                   Unknown

 

                          Phone                     Unavailable







Care Team Providers







                    Care Team Member Name    Role                Phone

 

                    Migration,  Doctor    Unavailable         Unavailable







PROBLEMS







          Type      Condition    ICD9-CM Code    WTL68-PW Code    Onset Dates    Condition Status    SNOMED

 Code

 

           Problem    Cigarette nicotine dependence without complication               F17.210               Active

                                        [id number]

 

             Problem      Erectile dysfunction due to diseases classified elsewhere                 N52.1          

                          Active                    [id number]

 

          Problem    Diabetes              E11.9               Active    [id number]

 

                Problem         Diabetic polyneuropathy associated with type 1 diabetes mellitus                    E10.42

                                        Active              [id number]







ALLERGIES

No Information



ENCOUNTERS







                Encounter       Location        Date            Diagnosis

 

                          [place] [address] [place] [address]

                          06 May, 2019               

 

                          [facility]     [id number] [place] [address]

                          05 Mar, 2019               

 

                          [facility]     [id number] [place] [address]

                                         

 

                          [facility]     [id number] [place] [address]

                                         

 

                          [facility]     [id number] [place] [address]

                          28 Dec, 2018              Diabetes E11.9

 

                          [facility]     [id number] [place] [address]

                          25 Oct, 2018               

 

                          [facility]     [id number] [place] [address]

                          22 Oct, 2018               

 

                          [facility]     [id number] [place] [address]

                          19 Oct, 2018              Diabetic polyneuropathy associated with type 1 diabetes mellitus 

E10.42

 

                          [facility]     [id number] [address]

                          15 Oct, 2018              Diabetes E11.9

 

                          [facility]     [id number] [address]

                                         

 

                          [facility]     [id number] [address]

                                        Diabetes E11.9

 

                          [facility]     [id number] [address]

                                         

 

                          [facility]     [id number] [place] [address], [place] [address]

                          14 May, 2018               

 

                          [facility]     [id number] [place] [address]

                                         

 

                          [facility]     [id number] [place] [address]

                                         

 

                          [facility]     [id number] [place] [address]

                          20 Dec, 2017               

 

                          [facility]     [id number] [address]

                          05 Dec, 2017               

 

                          [facility]     [id number] [place] [address]

                                         

 

                          [facility]     [id number] [address]

                                         

 

                          [facility]     [id number] [address]

                                        Diabetes E11.9

 

                          [facility]     [id number] [address]

                          24 Aug, 2017              Type 1 diabetes mellitus with other neurologic complication E10.49



 

                          [facility]     [id number] [address]

                          24 Aug, 2017               

 

                          [facility]     [id number] [address]

                          04 Aug, 2017              Diabetes E11.9 ; Erectile dysfunction due to diseases classified 

elsewhere N52.1 ; Diabetic polyneuropathy associated with type 1 diabetes 
mellitus E10.42 and Cigarette nicotine dependence without complication F17.210

 

                          [facility]     [address] [place] [address]

                                        Hip fracture, left, closed, with routine healing, subsequent encounter

 S72.002D and Allergy, initial encounter T78.40XA

 

                          [facility]     [id number] [place] [address]

                                         

 

                          [facility]     [address] [address]

                          30 May, 2017              Femur fracture, left S72.92XA

 

                          [facility]     [id number] [place] [address]

                          30 May, 2017              Femur fracture, left S72.92XA

 

                          [facility]     [id number] [place] [address]

                          16 May, 2017              Diabetes E11.9 and Femur fracture, left S72.92XA

 

                          [facility]    [id number] [place] [id number]

                          09 May, 2017               

 

                          [facility]    [id number] [place] [address]

                          31 May, 2016               

 

                          [facility]     [id number] [place] [address]

                          31 May, 2016               

 

                          [facility]     [id number] [place] [address]

                                         

 

                          [facility]     [id number] [place] [address]

                                         

 

                          [facility]     [id number] [place] [address]

                          24 Mar, 2015               

 

                          [facility]     [id number] [place] [address]

                          24 Mar, 2015               

 

                          [facility]     [id number] [place] [address]

                          04 Mar, 2015               

 

                          [facility]     [id number] [place] [address]

                          04 Mar, 2015               

 

                          [facility] [place] [facility]     [address] [id number] [place] [address]

                          04 Mar, 2015               

 

                          [facility] [place] [facility]     [address] [id number] [place] [address]

                          04 Mar, 2015               

 

                          [facility] [place] [facility]     [address] [id number] [place] [address]

                          30 Dec, 2014               

 

                          [facility] [place] [facility]     [address] [id number] [place] [address]

                          30 Dec, 2014               

 

                          [facility] [place] [facility]     [address] [id number] [place] [address]

                          18 Dec, 2014               

 

                          [facility] [place] [facility]     [address] [id number] [place] [address]

                          18 Dec, 2014               

 

                          [facility] [place] [facility]     [address] [id number] [place] [address]

                          15 Dec, 2014               

 

                          [facility] [place] [facility]     [address] [id number] [place] [address]

                          12 Dec, 2014               

 

                          [facility] [place] [facility]     [address] [id number] [place] [address]

                          12 Dec, 2014               

 

                          [facility] [place] [facility]     [address] [id number] [place] [address]

                          24 Oct, 2014               

 

                          [facility] [place] [facility]     [address] [id number] [place] [address]

                          24 Oct, 2014               

 

                          [facility] [place] [facility]     [address] [id number] [place] [address]

                          21 Oct, 2014               

 

                          [facility] [place] [facility]     [address] [id number] [place] [address]

                          21 Oct, 2014               

 

                          [facility] [place] [facility]     [address] [id number] [place] [address]

                          16 Sep, 2014               

 

                          [facility] [place] [facility]     [address] [id number] [place] [address]

                          16 Sep, 2014               

 

                          [facility] [place] [facility]     [address] [id number] [place] [address]

                                         

 

                          [facility]     [address] [id number] [place] [address]

                                         

 

                          [facility] [place] [facility]     [address] [id number] [place] [address]

                          17 2014               

 

                          [facility] [place] [facility]     [address] [id number] [place] [address]

                          17 2014               

 

                          [facility] [place] [facility]     [address] [id number] [place] [address]

                          10 [place], 2014               

 

                          [facility] [place] [facility]     [address] [id number] [place] [address]

                          10 [place], 2014               

 

                          [facility] [place] [facility]     [address] [id number] [place] [address]

                                         

 

                          [facility] [place] [facility]     [address] [id number] [place] [address]

                                         

 

                          [facility] [place] [facility]     [address] [id number] [place] [address]

                          15 2013               

 

                          [facility] [place] [facility]     [address] [id number] [place] [address]

                          14 2013               

 

                          [facility] [place] [facility]     [address] [facility] [id number] [place] [address]

                          14 2013               

 

                          [facility] [place] [facility]     [address] [id number] [place] [address]

                                         

 

                          [facility] [place] [facility]     [address] [id number] [place] [address]

                                         

 

                          [facility] [place] [facility]     [address] [id number] [place] [address]

                          05 2013               

 

                          [facility] [place] [facility]     [address] [id number] [place] [address]

                          05 2013               

 

                          [facility] [place] [facility]     [address] [id number] [place] [address]

                          29 Oct, 2013               

 

                          [facility]     [address] [facility] [id number] [place] [address]

                          29 Oct, 2013               

 

                          [facility]     [address] [facility] [id number] [address]

                          13 Aug, 2013               

 

                          [facility]     [address] [facility] [id number] [place] [address]

                          23 May, 2013               

 

                          [facility]     [address] [facility] [id number] [place] [address]

                                         

 

                          [facility]     [address] [facility] [id number] [place] [address]

                                         

 

                          [facility]     [address] [facility] [id number] [place] [address]

                          12 Mar, 2013               

 

                          [facility]     [address] [facility] [id number] [place] [address]

                          05 Mar, 2013               

 

                          [facility]     [id number] [address]

                                         

 

                          [facility]     [id number] [address]

                                         

 

                          [facility]     [id number] [address]

                                         

 

                          [facility]     [id number] [address]

                          18 Dec, 2009               

 

                          [facility]     [id number] [address]

                                         

 

                          [facility]     [address] [facility] [id number] [place] [address]

                          26 Oct, 2009               

 

                          [facility]     [address] [facility] [id number] [place] [address]

                          23 Oct, 2009               

 

                          [facility]     [id number] [address]

                          14 Sep, 2009               







IMMUNIZATIONS

No Known Immunizations



SOCIAL HISTORY

Never Assessed



REASON FOR VISIT

[facility]



PLAN OF CARE





VITAL SIGNS





MEDICATIONS

Unknown Medications



RESULTS

No Results



PROCEDURES

No Known procedures



INSTRUCTIONS





MEDICATIONS ADMINISTERED

No Known Medications



MEDICAL (GENERAL) HISTORY







                    Type                Description         Date

 

                    Medical History     Diabetes             

 

                    Surgical History    Left hip surgery    

 

                    Surgical History    tonsilectomy         

 

                    Hospitalization History    [facility] for Left hip surgery

## 2019-07-03 NOTE — XMS REPORT
[facility]

                             Created on: 10/17/2018



Dat Ruiz

External Reference #: [id number]

: 1990

Sex: Male



Demographics







                          Address                   [address] [place]  [address]

 

                          Preferred Language        Unknown

 

                          Marital Status            Unknown

 

                          Yazidi Affiliation     Unknown

 

                          Race                      Unknown

 

                          Ethnic Group              Unknown





Author







                          Author                    VERONICA MENDES

 

                          Organization              [facility]

 

                          Address                   [address] [place]  [address]



 

                          Phone                     [phone]







Care Team Providers







                    Care Team Member Name    Role                Phone

 

                    VERONICA MENDES     Unavailable         [phone]







PROBLEMS







          Type      Condition    ICD9-CM Code    TSW12-PZ Code    Onset Dates    Condition Status    SNOMED

 Code

 

             Problem      Erectile dysfunction due to diseases classified elsewhere                 N52.1          

                          Active                    444983947

 

           Problem    Cigarette nicotine dependence without complication               F17.210               Active

                                        15403833

 

                Problem         Diabetic polyneuropathy associated with type 1 diabetes mellitus                    E10.42

                                        Active              49975739

 

          Problem    Diabetes              E11.9               Active    133863790







ALLERGIES

No Information



ENCOUNTERS







                Encounter       Location        Date            Diagnosis

 

                          [facility]     [id number] [place] [address]

                          19 Oct, 2018               

 

                          [facility]     [id number] [place] [address]

                          15 Oct, 2018              Diabetes E11.9

 

                          [facility]     [id number] [place] [address]

                                         

 

                          [facility]     [id number] [place] [address]

                                        Diabetes E11.9

 

                          [facility]     [id number] [place] [address]

                                         

 

                          [facility]     [id number] [place] [address]

                          14 May, 2018               

 

                          [facility]     [id number] [place] [address]

                                         

 

                          [facility]     [id number] [place] [address]

                                         

 

                          [facility]     [id number] [place] [address]

                          20 Dec, 2017               

 

                          [facility]     [address] [address]

                          05 Dec, 2017               

 

                          [facility]     [id number] [place] [address]

                                         

 

                          [facility]     [id number] [place] [address]

                                         

 

                          [facility]     [id number] [place] [address]

                                        Diabetes E11.9

 

                          [address] [id number] [place] [address]

                          24 Aug, 2017              Type 1 diabetes mellitus with other neurologic complication E10.49



 

                          [place] [id number] [place] [address]

                          24 Aug, 2017               

 

                          [place] [id number] [place] [address]

                          04 Aug, 2017              Diabetes E11.9 ; Erectile dysfunction due to diseases classified 

elsewhere N52.1 ; Diabetic polyneuropathy associated with type 1 diabetes 
mellitus E10.42 and Cigarette nicotine dependence without complication F17.210

 

                          [place] [id number] [place] [address]

                                        Hip fracture, left, closed, with routine healing, subsequent encounter

 S72.002D and Allergy, initial encounter T78.40XA

 

                          [place] [address] [place] [address]

                                         

 

                          [place] [id number] [place] [address]

                          30 May, 2017              Femur fracture, left S72.92XA

 

                          [place] [id number] [place] [address]

                          30 May, 2017              Femur fracture, left S72.92XA

 

                          [place] [address] [place] [address]

                          16 May, 2017              Diabetes E11.9 and Femur fracture, left S72.92XA

 

                          [facility]    [address] [id number] [place] [address]

                          09 May, 2017               

 

                          [facility]    [address] [id number] [place] [address]

                          31 May, 2016               

 

                          [facility]     [address] [facility] [id number] [place] [address]

                          31 May, 2016               

 

                          [facility]     [address] [facility] [id number] [address]

                                         

 

                          [facility]     [address] [id number] [place] [address]

                                         

 

                          [facility]     [address] [facility] [id number] [place] [address]

                          24 Mar, 2015               

 

                          [facility]     [id number] [address]

                          24 Mar, 2015               

 

                          [facility]     [address] [facility] [id number], [place] [address]

                          04 Mar, 2015               

 

                          [facility]     [id number] [address]

                          04 Mar, 2015               

 

                          [facility]     [address] [facility] [id number] [address]

                          04 Mar, 2015               

 

                          [facility]     [address] [facility] [id number] [address]

                          04 Mar, 2015               

 

                          [facility]     [address] [facility] [id number], [place] [address]

                          30 Dec, 2014               

 

                          [facility] [place] [facility]     [address] [facility] [id number] [place] [address]

                          30 Dec, 2014               

 

                          [facility]     [address] [facility] [id number], [place] [address]

                          18 Dec, 2014               

 

                          [facility] [place] [facility]     [address] [facility] [id number][place] [address]

                          18 Dec, 2014               

 

                          [facility]     [id number] [address]

                          15 Dec, 2014               

 

                          [facility] [place] [facility]     [address] [id number] [place] [address]

                          12 Dec, 2014               

 

                          [facility] [place] [facility]     [address] [id number] [place] [address]

                          12 Dec, 2014               

 

                          [facility] [place] [facility]     [address] [id number] [place] [address]

                          24 Oct, 2014               

 

                          [facility] [place] [facility]     [address] [id number] [place] [address]

                          24 Oct, 2014               

 

                          [facility] [place] [facility]     [address] [id number] [place] [address]

                          21 Oct, 2014               

 

                          [facility] [place] [facility]     [address] [id number] [place] [address]

                          21 Oct, 2014               

 

                          [facility] [place] [facility]     [address] [id number] [place] [address]

                          16 Sep, 2014               

 

                          [facility] [place] [facility]     [address] [id number] [place] [address]

                          16 Sep, 2014               

 

                          [facility] [place] [facility]     [address] [id number] [place] [address]

                                         

 

                          [facility] [place] [facility]     [address] [id number] [place] [address]

                                         

 

                          [facility] [place] [facility]     [address] [id number] [place] [address]

                          17 2014               

 

                          [facility] [place] [facility]     [address] [id number] [place] [address]

                          17 2014               

 

                          [facility] [place] [facility]     [address] [id number] [place] [address]

                          [address] [place], 2014               

 

                          [facility] [place] [facility]     [address] [id number] [place] [address]

                          [address] [place], 2014               

 

                          [facility] [place] [facility]     [address] [id number] [place] [address]

                          22 2014               

 

                          [facility] [place] [facility]     [address] [id number] [place] [address]

                                         

 

                          [facility] [place] [facility]     [address] [id number] [place] [address]

                          15 Nov, 2013               

 

                          [facility] [place] [facility]     [address] [id number] [place] [address]

                                         

 

                          [facility] [place] [facility]     [address] [id number] [place] [address]

                                         

 

                          [facility] [place] [facility]     [address] [id number] [place] [address]

                                         

 

                          [facility] [place] [facility]     [address] [id number] [place] [address]

                                         

 

                          [facility] [place] [facility]     [address] [id number] [place] [address]

                                         

 

                          [facility] [place] [facility]     [address] [id number] [place] [address]

                                         

 

                          [facility] [place] [facility]     [address] [id number] [place] [address]

                          29 Oct, 2013               

 

                          [facility] [place] [facility]     [address] [id number] [place] [address]

                          29 Oct, 2013               

 

                          [facility] [place] [facility]     [address] [id number] [place] [address]

                          13 Aug, 2013               

 

                          [facility] [place] [facility]     [address] [id number] [place] [address]

                          23 May, 2013               

 

                          [facility] [place] [facility]     [address] [id number] [place] [address]

                                         

 

                          [facility] [place] [facility]     [address] [id number] [place] [address]

                                         

 

                          [facility] [place] [facility]     [address] [id number] [place] [address]

                          12 Mar, 2013               

 

                          [facility] [place] [facility]     [address] [id number], [place] [address]

                          05 Mar, 2013               

 

                          [facility]     [id number] [address]

                          16 2012               

 

                          [facility]     [address] [address]

                                         

 

                          [facility]     [address] [address]

                                         

 

                          [facility]     [address] [address]

                          18 Dec, 2009               

 

                          [facility]     [address] [address]

                                         

 

                          [facility]     [address] [address]

                          26 Oct, 2009               

 

                          [facility]     [address] [address]

                          23 Oct, 2009               

 

                          [facility]     [address] [address]

                          14 Sep, 2009               







IMMUNIZATIONS

No Known Immunizations



SOCIAL HISTORY

Never Assessed



REASON FOR VISIT

Refill request



PLAN OF CARE





VITAL SIGNS





MEDICATIONS







        Medication    Instructions    Dosage    Frequency    Start Date    End Date    Duration    Status



 

                Humalog 100 UNIT/ML                    INJECT 80 UNITS UNDER THE SKIN DAILY PER INSULIN PUMP     

                                                31              Active







RESULTS

No Results



PROCEDURES

No Known procedures



INSTRUCTIONS





MEDICATIONS ADMINISTERED

No Known Medications



MEDICAL (GENERAL) HISTORY







                    Type                Description         Date

 

                    Medical History     Diabetes             

 

                    Surgical History    Left hip surgery    

 

                    Surgical History    tonsilectomy         

 

                    Hospitalization History     [facility]

## 2019-07-03 NOTE — XMS REPORT
[facility]

                             Created on: 11/10/2017



Dat Ruiz

External Reference #: [id number]

: 1990

Sex: Male



Demographics







                          [address]                   [address] [place]  [address]

 

                          Preferred Language        Unknown

 

                          Marital Status            Unknown

 

                          Restorationist Affiliation     Unknown

 

                          Race                      Unknown

 

                          Ethnic Group              Unknown





Author







                          Author                    VERONICA MENDES

 

                          [facility]

 

                          Address                   [address] [place]  [address]



 

                          Phone                     [phone]







Care Team Providers







                    Care Team Member Name    Role                Phone

 

                    VERONICA MENDES     Unavailable         [phone]







PROBLEMS







          Type      Condition    ICD9-CM Code    VVI71-LW Code    Onset Dates    Condition Status    SNOMED

 Code

 

             Problem      Erectile dysfunction due to diseases classified elsewhere                 N52.1          

                          Active                    115584883

 

           Problem    Cigarette nicotine dependence without complication               F17.210               Active

                                        [id number]

 

                Problem         Diabetic polyneuropathy associated with type 1 diabetes mellitus                    E10.42

                                        Active              21689252

 

          Problem    Diabetes              E11.9               Active    862511001







ALLERGIES

No Information



SOCIAL HISTORY

Never Assessed



PLAN OF CARE





VITAL SIGNS





MEDICATIONS







        Medication    Instructions    Dosage    Frequency    Start Date    End Date    Duration    Status



 

                Hydrocodone-Acetaminophen  MG    Orally 3 times a day    1tablet as needed    8h

                16 May, 2017    13 [place], 2017    14 days         Active







RESULTS

No Results



PROCEDURES

No Known procedures



IMMUNIZATIONS

No Known Immunizations



MEDICAL (GENERAL) HISTORY







                    Type                Description         Date

 

                    Medical History     Diabetes             

 

                    Surgical History    Left hip surgery    2017

 

                    Surgical History    tonsilectomy         

 

                    Hospitalization History     [facility]

## 2019-07-03 NOTE — XMS REPORT
[facility]

                             Created on: 2017



Dat Ruiz

External Reference #: [id number]

: 1990

Sex: Male



Demographics







                          Address                   [address] [place]  [address]

 

                          Preferred Language        Unknown

 

                          Marital Status            Unknown

 

                          Mormonism Affiliation     Unknown

 

                          Race                      Unknown

 

                          Ethnic Group              Unknown





Author







                          Author                    VERONICA MENDES

 

                          [facility]

 

                          Address                   [address] [place]  [address]



 

                          Phone                     [phone]







Care Team Providers







                    Care Team Member Name    Role                Phone

 

                    VERONICA MENDES     Unavailable         [phone]







PROBLEMS







          Type      Condition    ICD9-CM Code    OGX14-AZ Code    Onset Dates    Condition Status    SNOMED

 Code

 

             Problem      Erectile dysfunction due to diseases classified elsewhere                 N52.1          

                          Active                    [id number]

 

           Problem    Cigarette nicotine dependence without complication               F17.210               Active

                                        56212406

 

                Problem         Diabetic polyneuropathy associated with type 1 diabetes mellitus                    E10.42

                                        Active              83264625

 

          Problem    Diabetes              E11.9               Active    394039690







ALLERGIES

No Information



SOCIAL HISTORY

Never Assessed



PLAN OF CARE





VITAL SIGNS





MEDICATIONS







        Medication    Instructions    Dosage    Frequency    Start Date    End Date    Duration    Status



 

             Hydrocodone-Acetaminophen  MG    Orally every 6 hrs    1tablet as needed    6h           

30 May, 2017                            14 days             Active







RESULTS

No Results



PROCEDURES

No Known procedures



IMMUNIZATIONS

No Known Immunizations



MEDICAL (GENERAL) HISTORY







                    Type                Description         Date

 

                    Medical History     Diabetes             

 

                    Surgical History    Left hip surgery    

 

                    Surgical History    tonsilectomy         

 

                    Hospitalization History     [facility]

## 2019-07-03 NOTE — XMS REPORT
[facility]

                             Created on: 2018



Dat Ruiz

External Reference #: [id number]

: 1990

Sex: Male



Demographics







                          Address                   [address] [place]  [address]

 

                          Preferred Language        Unknown

 

                          Marital Status            Unknown

 

                          Voodoo Affiliation     Unknown

 

                          Race                      Unknown

 

                          Ethnic Group              Unknown





Author







                          Author                    VERONICA MENDES

 

                          Organization              [facility]

 

                          Address                   [address] [place]  [address]



 

                          Phone                     [phone]







Care Team Providers







                    Care Team Member Name    Role                Phone

 

                    VERONICA MENDES     Unavailable         [phone]







PROBLEMS







          Type      Condition    ICD9-CM Code    PPM93-QF Code    Onset Dates    Condition Status    SNOMED

 Code

 

             Problem      Erectile dysfunction due to diseases classified elsewhere                 N52.1          

                          Active                    [id number]

 

           Problem    Cigarette nicotine dependence without complication               F17.210               Active

                                        [id number]

 

                Problem         Diabetic polyneuropathy associated with type 1 diabetes mellitus                    E10.42

                                        Active              [id number]

 

          Problem    Diabetes              E11.9               Active    [id number]







ALLERGIES

No Information



ENCOUNTERS







                Encounter       Location        Date            Diagnosis

 

                          [facility]     [id number] [place] [address]

                          14 May, 2018               

 

                          [facility]     [id number] [place] [address]

                                         

 

                          [facility]     [id number] [place] [address]

                                         

 

                          [facility]     [id number] [place] [address]

                          20 Dec, 2017               

 

                          [facility]     [id number] [place] [address]

                          05 Dec, 2017               

 

                          [facility]     [id number] [place] [address]

                                         

 

                          [facility]     [id number] [place] [address]

                                         

 

                          [facility]     [id number] [place] [address]

                                        Diabetes E11.9

 

                          [facility]     [id number] [place] [address]

                          24 Aug, 2017              Type 1 diabetes mellitus with other neurologic complication E10.49



 

                          [facility]     [id number] [place] [address]

                          24 Aug, 2017               

 

                          [facility]     [id number] [place] [address]

                          04 Aug, 2017              Diabetes E11.9 ; Erectile dysfunction due to diseases classified 

elsewhere N52.1 ; Diabetic polyneuropathy associated with type 1 diabetes 
mellitus E10.42 and Cigarette nicotine dependence without complication F17.210

 

                          [facility]     [id number] [place] [address]

                                        Hip fracture, left, closed, with routine healing, subsequent encounter

 S72.002D and Allergy, initial encounter T78.40XA

 

                          [facility]     [address] [address]

                                         

 

                          [facility]     [address] [address]

                          30 May, 2017              Femur fracture, left S72.92XA

 

                          [id number] [place] [address]

                          30 May, 2017              Femur fracture, left S72.92XA

 

                          [facility]     [id number] [place] [address]

                          16 May, 2017              Diabetes E11.9 and Femur fracture, left S72.92XA

 

                          [facility]    [address] [id number]

                          09 May, 2017               

 

                          [facility]    [id number] [place] [address]

                          31 May, 2016               

 

                          [facility]     [id number] [place] [address]

                          31 May, 2016               

 

                          [facility]     [id number] [place] [address]

                                         

 

                          [facility]     [id number] [place] [address]

                                         

 

                          [facility] [place] [facility]     [address] [id number] [place] [address]

                          24 Mar,                

 

                          [facility] [place] [facility]     [address] [id number] [place] [address]

                          24 Mar, 2015               

 

                          [facility] [place] [facility]     [address] [id number] [place] [address]

                          04 Mar,                

 

                          [facility] [place] [facility]     [address] [id number] [place] [address]

                          04 Mar,                

 

                          [facility] [place] [facility]     [address] [id number] [place] [address]

                          04 Mar,                

 

                          [facility] [place] [facility]     [address] [id number] [place] [address]

                          04 Mar,                

 

                          [facility] [place] [facility]     [address] [id number] [place] [address]

                          30 Dec, 2014               

 

                          [facility] [place] [facility]     [address] [id number] [place] [address]

                          30 Dec, 2014               

 

                          [facility] [place] [facility]     [address] [id number] [place] [address]

                          18 Dec, 2014               

 

                          [facility] [place] [facility]     [address] [id number] [place] [address]

                          18 Dec, 2014               

 

                          [facility] [place] [facility]     [address] [id number] [place] [address]

                          15 Dec, 2014               

 

                          [facility] [place] [facility]     [address] [id number] [place] [address]

                          12 Dec, 2014               

 

                          [facility] [place] [facility]     [address] [id number] [place] [address]

                          12 Dec, 2014               

 

                          [facility] [place] [facility]     [address] [id number] [place] [address]

                          24 Oct, 2014               

 

                          [facility] [place] [facility]     [address] [id number] [place] [address]

                          24 Oct, 2014               

 

                          [facility] [place] [facility]     [address] [id number] [place] [address]

                          21 Oct, 2014               

 

                          [facility] [place] [facility]     [address] [id number] [place] [address]

                          21 Oct, 2014               

 

                          [facility] [place] [facility]     [address] [id number] [place] [address]

                          16 Sep, 2014               

 

                          [facility] [place] [facility]     [address] [id number] [place] [address]

                          16 Sep, 2014               

 

                          [facility] [place] [facility]     [address] [id number] [place] [address]

                          20 2014               

 

                          [facility] [place] [facility]     [address] [id number] [place] [address]

                                         

 

                          [facility] [place] [facility]     [address] [id number] [place] [address]

                          17 2014               

 

                          [facility] [place] [facility]     [address] [id number] [place] [address]

                                         

 

                          [facility] [place] [facility]     [address] [id number] [place] [address]

                          10 [place], 2014               

 

                          [facility] [place] [facility]     [address] [id number] [place] [address]

                          10 [place], 2014               

 

                          [facility] [place] [facility]     [address] [id number] [place] [address]

                                         

 

                          [facility] [place] [facility]     [address] [id number] [place] [address]

                                         

 

                          [facility] [place] [facility]     [address] [id number][place] [address]

                          15 2013               

 

                          [facility] [place] [facility]     [address] [id number] [place] [address]

                          14 2013               

 

                          [facility] [place] [facility]     [address] [id number] [place] [address]

                          14 2013               

 

                          [facility] [place] [facility]     [address] [id number] [place] [address]

                          12 2013               

 

                          [facility] [place] [facility]     [address] [id number] [place] [address]

                                         

 

                          [facility]     [address] [id number] [place] [address]

                                         

 

                          [facility]     [address] [id number] [place] [address]

                                         

 

                          [facility]     [address] [id number] [place] [address]

                          29 Oct, 2013               

 

                          [facility]     [address] [id number] [place] [address]

                          29 Oct, 2013               

 

                          [facility]     [address] [id number] [place] [address]

                          13 Aug, 2013               

 

                          [facility]     [address] [id number] [place] [address]

                          23 May, 2013               

 

                          [facility]     [address] [id number] [place] [address]

                                         

 

                          [facility]     [address] [id number] [place] [address]

                                         

 

                          [facility]     [address] [id number] [place] [address]

                          12 Mar, 2013               

 

                          [facility]     [address] [id number] [place] [address]

                          05 Mar, 2013               

 

                          [facility]     [address] [id number] [place] [address]

                                         

 

                          [facility]     [address] [id number] [place] [address]

                                         

 

                          [facility]     [address] [id number] [place] [address]

                                         

 

                          [facility]     [address] [id number] [place] [address]

                          18 Dec, 2009               

 

                          [facility]     [address] [id number] [place] [address]

                                         

 

                          [facility]     [address] [facility] [id number] [place] [address]

                          26 Oct, 2009               

 

                          [facility]     [address] [facility] [id number] [place] [address]

                          23 Oct, 2009               

 

                          [facility]     [address] [facility] [id number] [place] [address]

                          14 Sep, 2009               







IMMUNIZATIONS

No Known Immunizations



SOCIAL HISTORY

Never Assessed



REASON FOR VISIT

PA for Contour Next



PLAN OF CARE





VITAL SIGNS





MEDICATIONS

Unknown Medications



RESULTS

No Results



PROCEDURES

No Known procedures



INSTRUCTIONS





MEDICATIONS ADMINISTERED

No Known Medications



MEDICAL (GENERAL) HISTORY







                    Type                Description         Date

 

                    Medical History     Diabetes             

 

                    Surgical History    Left hip surgery    

 

                    Surgical History    tonsilectomy         

 

                    Hospitalization History     [facility]

## 2019-07-03 NOTE — XMS REPORT
[facility]

                             Created on: 10/24/2018



Dat Ruiz

External Reference #: [id number]

: 1990

Sex: Male



Demographics







                          Address                   [address] [place]  [address]

 

                          Preferred Language        Unknown

 

                          Marital Status            Unknown

 

                          Protestant Affiliation     Unknown

 

                          Race                      Unknown

 

                          Ethnic Group              Unknown





Author







                          Author                    VERONICA MENDES

 

                          [facility]

 

                          Address                   [address] [place]  [address]



 

                          Phone                     [phone]







Care Team Providers







                    Care Team Member Name    Role                Phone

 

                    VERONICA MENDES     Unavailable         [phone]







PROBLEMS







          Type      Condition    ICD9-CM Code    VSV99-FA Code    Onset Dates    Condition Status    SNOMED

 Code

 

             Problem      Erectile dysfunction due to diseases classified elsewhere                 N52.1          

                          Active                    [id number]

 

           Problem    Cigarette nicotine dependence without complication               F17.210               Active

                                        [id number]

 

                Problem         Diabetic polyneuropathy associated with type 1 diabetes mellitus                    E10.42

                                        Active              [id number]

 

          Problem    Diabetes              E11.9               Active    [id number]







ALLERGIES







             Substance    Reaction     Event Type    Date         Status

 

             Prozac       shortness of breath    Drug Allergy    19 Oct, 2018    Active







ENCOUNTERS







                Encounter       Location        Date            Diagnosis

 

                          [place] [id number], [place] [address]

                          22 Oct, 2018               

 

                          [facility]     [id number] [place] [address]

                          19 Oct, 2018              Diabetic polyneuropathy associated with type 1 diabetes mellitus 

E10.42

 

                          [facility]     [address] [address]

                          15 Oct, 2018              Diabetes E11.9

 

                          [facility]     [address] [address]

                                         

 

                          [facility]     [address] [address]

                                        Diabetes E11.9

 

                          [facility]     [id number] [place] [address]

                                         

 

                          [facility]     [id number] [address]

                          14 May, 2018               

 

                          [facility]     [id number] [place] [address]

                                         

 

                          [facility]     [address] [place] [address]

                                         

 

                          [facility]     [id number] [place] [address]

                          20 Dec, 2017               

 

                          [facility]     [id number] [place] [address]

                          05 Dec, 2017               

 

                          [facility]     [id number] [place] [address]

                                         

 

                          [facility]     [id number] [place] [address]

                                         

 

                          [facility]     [id number] [place] [address]

                                        Diabetes E11.9

 

                          [place] [id number] [place] [address]

                          24 Aug, 2017              Type 1 diabetes mellitus with other neurologic complication E10.49



 

                          [place] [address] [place] [address]

                          24 Aug, 2017               

 

                          [facility]     [id number] [place] [address]

                          04 Aug, 2017              Diabetes E11.9 ; Erectile dysfunction due to diseases classified 

elsewhere N52.1 ; Diabetic polyneuropathy associated with type 1 diabetes 
mellitus E10.42 and Cigarette nicotine dependence without complication F17.210

 

                          [place] [address] [place] [address]

                                        Hip fracture, left, closed, with routine healing, subsequent encounter

 S72.002D and Allergy, initial encounter T78.40XA

 

                          [place] [address] [place] [address]

                                         

 

                          [facility]     [id number] [place] [address]

                          30 May, 2017              Femur fracture, left S72.92XA

 

                          [facility]     [id number] [place] [address]

                          30 May, 2017              Femur fracture, left S72.92XA

 

                          [facility]     [address] [facility] [id number] [place] [address]

                          16 May, 2017              Diabetes E11.9 and Femur fracture, left S72.92XA

 

                          [facility]    [address] [id number] [address]

                          09 May, 2017               

 

                          [facility]    [address] [facility] [id number] [address]

                          31 May, 2016               

 

                          [facility]     [address] [facility] [id number] [address]

                          31 May, 2016               

 

                          [facility]     [id number] [place] [address]

                                         

 

                          [facility]     [id number] [place] [address]

                                         

 

                          [facility]     [id number] [place] [address]

                          24 Mar, 2015               

 

                          [facility]     [id number] [address]

                          24 Mar, 2015               

 

                          [facility]     [address] [address]

                          04 Mar, 2015               

 

                          [facility]     [id number] [address]

                          04 Mar, 2015               

 

                          [facility]     [id number] [address]

                          04 Mar, 2015               

 

                          [facility]     [id number] [address]

                          04 Mar, 2015               

 

                          [facility]     [address] [facility] [id number] [address]

                          30 Dec, 2014               

 

                          [facility]     [id number] [address]

                          30 Dec, 2014               

 

                          [facility]     [id number] [place] [address], [place] [address]

                          18 Dec, 2014               

 

                          [facility] [place] [facility]     [address] [id number] [place] [address]

                          18 Dec, 2014               

 

                          [facility] [place] [facility]     [address] [id number] [place] [address]

                          15 Dec, 2014               

 

                          [facility] [place] [facility]     [address] [id number] [place] [address]

                          12 Dec, 2014               

 

                          [facility] [place] [facility]     [address] [id number] [place] [address]

                          12 Dec, 2014               

 

                          [facility] [place] [facility]     [address] [id number] [place] [address]

                          24 Oct, 2014               

 

                          [facility] [place] [facility]     [address] [id number] [place] [address]

                          24 Oct, 2014               

 

                          [facility] [place] [facility]     [address] [id number] [place] [address]

                          21 Oct, 2014               

 

                          [facility] [place] [facility]     [address] [id number] [place] [address]

                          21 Oct, 2014               

 

                          [facility] [place] [facility]     [address] [id number] [place] [address]

                          16 Sep, 2014               

 

                          [facility] [place] [facility]     [address] [facility] [id number] [place] [address]

                          16 Sep, 2014               

 

                          [facility] [place] [facility]     [address] [id number] [place] [address]

                          20 2014               

 

                          [facility] [place] [facility]     [address] [id number] [place] [address]

                          20 2014               

 

                          [facility] [place] [facility]     [address] [id number] [place] [address]

                          17 2014               

 

                          [facility] [place] [facility]     [address] [id number] [place] [address]

                          17 2014               

 

                          [facility] [place] [facility]     [address] [facility] [id number] [place] [address]

                          10 2014               

 

                          [facility] [place] [facility]     [address] [id number] [place] [address]

                          10 Wellington, 2014               

 

                          [facility] [place] [facility]     [address] [id number] [place] [address]

                                         

 

                          [facility] [place] [facility]     [address] [id number] [place] [address]

                                         

 

                          [facility] [place] [facility]     [address] [id number] [place] [address]

                          15 Nov, 2013               

 

                          [facility] [place] [facility]     [address] [id number] [place] [address]

                                         

 

                          [facility] [place] [facility]     [address] [id number] [place] [address]

                                         

 

                          [facility] [place] [facility]     [address] [id number] [place] [address]

                                         

 

                          [facility] [place] [facility]     [address] [id number] [place] [address]

                                         

 

                          [facility] [place] [facility]     [address] [id number] [place] [address]

                                         

 

                          [facility] [place] [facility]     [address] [id number] [place] [address]

                                         

 

                          [facility] [place] [facility]     [address] [id number] [place] [address]

                          29 Oct, 2013               

 

                          [facility] [place] [facility]     [address] [id number] [place] [address]

                          29 Oct, 2013               

 

                          [facility] [place] [facility]     [address] [id number] [place] [address]

                          13 Aug, 2013               

 

                          [facility] [place] [facility]     [address] [id number] [place] [address]

                          23 May, 2013               

 

                          [facility] [place] [facility]     [address] [id number] [place] [address]

                                         

 

                          [facility] [place] [facility]     [address] [id number] [place] [address]

                                         

 

                          [facility]     [id number] [address]

                          12 Mar, 2013               

 

                          [facility]     [address] [address]

                          05 Mar, 2013               

 

                          [facility]     [address] [address]

                                         

 

                          [facility]     [address] [address]

                                         

 

                          [facility]     [address] [address]

                                         

 

                          [facility]     [address] [place] [address]

                          18 Dec, 2009               

 

                          [facility]     [address] [address]

                                         

 

                          [facility]     [address] [address]

                          26 Oct, 2009               

 

                          [facility]     [address] [address]

                          23 Oct, 2009               

 

                          [facility]     [address] [address]

                          14 Sep, 2009               







IMMUNIZATIONS

No Known Immunizations



SOCIAL HISTORY

Never Assessed



REASON FOR VISIT

Diabetes  check up Farhana GAR ,  A1C done in visit   Farhana GAR , from knees to toes
are numb  x8-9 years with tingling   Farhana GAR, hip is hurting from being shatte
red last year- consult on pain medications  Farhana GAR 



PLAN OF CARE







                          Activity                  Details









                                         









                          Follow Up                 Will call after lab; needs to get re-established with Lakshmi Reason:







VITAL SIGNS







                    Height              72 in               2018-10-19

 

                    Weight              173.4 lbs           2018-10-19

 

                    Temperature         97.6 degrees Fahrenheit    2018-10-19

 

                    Heart Rate          100 bpm             2018-10-19

 

                    Respiratory Rate    18                  2018-10-19

 

                    BMI                 23.51 kg/m2         2018-10-19

 

                    Blood pressure systolic    118 mmHg            2018-10-19

 

                    Blood pressure diastolic    70 mmHg             2018-10-19







MEDICATIONS







        Medication    Instructions    Dosage    Frequency    Start Date    End Date    Duration    Status



 

                Humalog 100 UNIT/ML                    INJECT 80 UNITS UNDER THE SKIN DAILY PER INSULIN PUMP     

                                                31              Active

 

        Comfort Assist Insulin Syringe 1 mL            FOUR TIMES A DAY                            25      Active

 

             Humalog 100 UNIT/ML    DX E10.42 per insulin pump    80 units daily                 24 Mar, 2015

                                                            Active

 

                          Blood Glucose Test -      Anni Contour Next and lancets DX E10.42 Test 8 times daily

 on insulin pump    test blood sugar               24 Aug, 2017                          Active

 

             AgaMatrix Ultra-Thin Lancets 33                 USE ONE LANCET TO TEST FOUR TIMES A DAY                 

                                        25                  Active







RESULTS

No Results



PROCEDURES







                Procedure       Date Ordered    Result          Body Site

 

                LAB NOT BILLED BY [employer]    Oct 19, 2018                     

 

                MICROALBUMIN, SEMIQUANT    Oct 19, 2018                     

 

                GLYCATED HEMOGLOBIN TEST    Oct 19, 2018                     

 

                VENIPUNCT, ROUTINE*    Oct 19, 2018                     







INSTRUCTIONS





MEDICATIONS ADMINISTERED

No Known Medications



MEDICAL (GENERAL) HISTORY







                    Type                Description         Date

 

                    Medical History     Diabetes             

 

                    Surgical History    Left hip surgery    

 

                    Surgical History    tonsilectomy         

 

                    Hospitalization History     [facility]

## 2019-10-09 ENCOUNTER — HOSPITAL ENCOUNTER (EMERGENCY)
Dept: HOSPITAL 75 - ER | Age: 29
Discharge: HOME | End: 2019-10-09
Payer: MEDICAID

## 2019-10-09 VITALS — SYSTOLIC BLOOD PRESSURE: 121 MMHG | DIASTOLIC BLOOD PRESSURE: 99 MMHG

## 2019-10-09 VITALS — HEIGHT: 75 IN | WEIGHT: 170.42 LBS | BODY MASS INDEX: 21.19 KG/M2

## 2019-10-09 DIAGNOSIS — E10.40: ICD-10-CM

## 2019-10-09 DIAGNOSIS — F31.9: ICD-10-CM

## 2019-10-09 DIAGNOSIS — J45.909: ICD-10-CM

## 2019-10-09 DIAGNOSIS — Z77.22: ICD-10-CM

## 2019-10-09 DIAGNOSIS — Z79.01: ICD-10-CM

## 2019-10-09 DIAGNOSIS — Z79.4: ICD-10-CM

## 2019-10-09 DIAGNOSIS — Z80.8: ICD-10-CM

## 2019-10-09 DIAGNOSIS — K21.9: ICD-10-CM

## 2019-10-09 DIAGNOSIS — F41.9: ICD-10-CM

## 2019-10-09 DIAGNOSIS — L02.212: Primary | ICD-10-CM

## 2019-10-09 DIAGNOSIS — Z88.5: ICD-10-CM

## 2019-10-09 PROCEDURE — 99282 EMERGENCY DEPT VISIT SF MDM: CPT

## 2019-10-09 PROCEDURE — 99283 EMERGENCY DEPT VISIT LOW MDM: CPT

## 2019-10-09 NOTE — ED INTEGUMENTARY GENERAL
General


Stated Complaint:  LOWER BACK WOUND


Source:  patient


Exam Limitations:  no limitations





History of Present Illness


Date Seen by Provider:  Oct 9, 2019


Time Seen by Provider:  10:58


Initial Comments


To ER with a left flank reddened wound for the past 3 days, no fevers or chills.

He is diabetic.


Timing/Duration:  just prior to arrival, getting worse


Severity:  moderate


Location:  torso


Associated Symptoms:  denies symptoms





Allergies and Home Medications


Allergies


Coded Allergies:  


     fluoxetine (Verified  Allergy, Unknown, 6/22/09)


     trazodone (Unverified  Allergy, Unknown, 11/30/14)





Home Medications


Bisacodyl 5 Mg Tablet.dr, 5 MG PO DAILY PRN for CONSTIPATION-1ST LINE


   Prescribed by: LLUVIA GA on 5/9/17 1120


Cephalexin 500 Mg Capsule, 500 MG PO TID


   Prescribed by: MARTA BOONE on 7/3/19 1657


Hydrocodone Bit/Acetaminophen 1 Each Tablet, 1-2 TAB PO Q6H PRN for PAIN-

MODERATE TO SEVERE


   Prescribed by: LLUVIA GA on 5/9/17 1120


Insulin Regular, Human 100 Unit/1 Ml Vial, 10-15 UNIT SQ AC, (Reported)


   10-15 UNITS BASED ON CARB COUNTING 


Sulfamethoxazole/Trimethoprim 1 Each Tablet, 1 EACH PO BID


   Prescribed by: ANDI RILEY on 9/8/18 1627


Warfarin Sodium 5 Mg Tablet, 5 MG PO DAILY@1800


   Prescribed by: LLUVIA GA on 5/9/17 1120





Patient Home Medication List


Home Medication List Reviewed:  Yes





Review of Systems


Review of Systems


Constitutional:  see HPI


EENTM:  see HPI


Respiratory:  no symptoms reported


Cardiovascular:  no symptoms reported


Genitourinary:  no symptoms reported


Musculoskeletal:  no symptoms reported


Skin:  see HPI


Psychiatric/Neurological:  No Symptoms Reported


Endocrine:  No Symptoms Reported


Hematologic/Lymphatic:  No Symptoms Reported





Past Medical-Social-Family Hx


Patient Social History


Drug of Choice:  MARIJUANA-EVERY ONCE IN AWHILE


Type Used:  Cigarettes


2nd Hand Smoke Exposure:  Yes


Recent Hopitalizations:  No





Immunizations Up To Date


Tetanus Booster (TDap):  Unknown


PED Vaccines UTD:  Yes





Seasonal Allergies


Seasonal Allergies:  No





Past Medical History


Surgeries:  Yes (EGD)


Respiratory:  Yes


Asthma


Currently Using CPAP:  No


Currently Using BIPAP:  No


Cardiac:  No


Neurological:  Yes


Neuropathy


Reproductive Disorders:  No


Sexually Transmitted Disease:  No


HIV/AIDS:  No


Genitourinary:  Yes


Prostate Problems


Gastrointestinal:  Yes (current constipation)


Gastroesophageal Reflux, Liver Disease/Jaundice


Musculoskeletal:  No


Endocrine:  Yes (Type I)


Diabetes, Insulin dep


HEENT:  No


Cancer:  No


Psychosocial:  Yes


Anxiety, Bipolar, Depression


Integumentary:  Yes


Eczema


Blood Disorders:  No


Adverse Reaction/Blood Tranf:  No





Family Medical History





Diabetes mellitus


  19 FATHER


  UNCLE


FH: COPD (chronic obstructive pulmonary disease)


  19 MOTHER


FH: brain cancer


  AUNT


FH: diverticulitis


  19 FATHER


Thyroid disease


  19 MOTHER


No Pertinent Family Hx





Physical Exam


Vital Signs


Capillary Refill :


General Appearance:  WD/WN, no apparent distress


HEENT:  PERRL/EOMI, normal ENT inspection


Neck:  non-tender, full range of motion


Respiratory:  no respiratory distress, no accessory muscle use


Gastrointestinal:  normal bowel sounds, non tender, soft


Neurologic/Psychiatric:  alert, normal mood/affect, oriented x 3


Skin:  normal color, warm/dry


Skin Problem Character:  abscess, other (there is a 4 cm circular area of 

erythema and induration to the left flank with no fluctuance to suggest 

drainable abscess. We'll treat empirically with antibiotics, if this becomes 

fluctuant and we will proceed with incision and drainage.)





Departure


Impression





   Primary Impression:  


   Abscess


Disposition:  01 HOME, SELF-CARE


Condition:  Stable





Departure-Patient Inst.


Decision time for Depature:  10:59


Referrals:  


VERONICA MENDES MD (PCP/Family)


Primary Care Physician


Patient Instructions:  Skin Abscess





Add. Discharge Instructions:  


1. Antibiotics as directed


2. Return to ER for any worsening


3. Follow-up with your doctor next week for recheck. If this becomes softer than

it could be drained, currently there is nothing to drain.


Scripts


Sulfamethoxazole/Trimethoprim (Bactrim Ds Tablet) 1 Each Tablet


1 EACH PO BID, #20 TAB


   Prov: MARAT BOONE         10/9/19











MARTA BOONE              Oct 9, 2019 11:00

## 2019-10-14 ENCOUNTER — HOSPITAL ENCOUNTER (EMERGENCY)
Dept: HOSPITAL 75 - ER | Age: 29
Discharge: HOME | End: 2019-10-14
Payer: MEDICAID

## 2019-10-14 VITALS — SYSTOLIC BLOOD PRESSURE: 126 MMHG | DIASTOLIC BLOOD PRESSURE: 86 MMHG

## 2019-10-14 VITALS — WEIGHT: 164.24 LBS | BODY MASS INDEX: 21.3 KG/M2 | HEIGHT: 73.62 IN

## 2019-10-14 DIAGNOSIS — E11.40: ICD-10-CM

## 2019-10-14 DIAGNOSIS — K21.9: ICD-10-CM

## 2019-10-14 DIAGNOSIS — Z79.4: ICD-10-CM

## 2019-10-14 DIAGNOSIS — J45.909: ICD-10-CM

## 2019-10-14 DIAGNOSIS — Z85.841: ICD-10-CM

## 2019-10-14 DIAGNOSIS — F41.9: ICD-10-CM

## 2019-10-14 DIAGNOSIS — F31.9: ICD-10-CM

## 2019-10-14 DIAGNOSIS — F17.210: ICD-10-CM

## 2019-10-14 DIAGNOSIS — Z79.01: ICD-10-CM

## 2019-10-14 DIAGNOSIS — Z88.8: ICD-10-CM

## 2019-10-14 DIAGNOSIS — L02.211: Primary | ICD-10-CM

## 2019-10-14 DIAGNOSIS — Z88.5: ICD-10-CM

## 2019-10-14 PROCEDURE — 87070 CULTURE OTHR SPECIMN AEROBIC: CPT

## 2019-10-14 PROCEDURE — 87186 SC STD MICRODIL/AGAR DIL: CPT

## 2019-10-14 PROCEDURE — 87205 SMEAR GRAM STAIN: CPT

## 2019-10-14 PROCEDURE — 10060 I&D ABSCESS SIMPLE/SINGLE: CPT

## 2019-10-14 PROCEDURE — 87077 CULTURE AEROBIC IDENTIFY: CPT

## 2019-10-14 NOTE — ED INTEGUMENTARY GENERAL
General


Chief Complaint:  Skin/Wound Problems


Stated Complaint:  ABSCESS ON LOWER LEFT BACK





History of Present Illness


Date Seen by Provider:  Oct 14, 2019


Time Seen by Provider:  17:20


Initial Comments


29 year old male presents with abscess to left flank. He was started on Bactrim 

DS on 10/9/19. He reports she is taking his medication as prescribed. He has not

followed up with his primary care provider. No other complaints at this time, 

reports using his insulin pump and glucose running 120-200.


Timing/Duration:  getting worse


Severity:  moderate


Location:  torso (left flank)


Associated Symptoms:  denies symptoms





Allergies and Home Medications


Allergies


Coded Allergies:  


     fluoxetine (Verified  Allergy, Unknown, 6/22/09)


     trazodone (Unverified  Allergy, Unknown, 11/30/14)





Home Medications


Bisacodyl 5 Mg Tablet.dr, 5 MG PO DAILY PRN for CONSTIPATION-1ST LINE


   Prescribed by: LLUVIA GA on 5/9/17 1120


Cephalexin 500 Mg Capsule, 500 MG PO TID


   Prescribed by: MARTA BOONE on 7/3/19 1657


Hydrocodone Bit/Acetaminophen 1 Each Tablet, 1-2 TAB PO Q6H PRN for PAIN-

MODERATE TO SEVERE


   Prescribed by: LLUVIA GA on 5/9/17 1120


Insulin Regular, Human 100 Unit/1 Ml Vial, 10-15 UNIT SQ AC, (Reported)


   10-15 UNITS BASED ON CARB COUNTING 


Mupirocin 22 Gm Oint...g., 22 GM TP TID


   Prescribed by: DEMI SWANSON on 10/14/19 1805


Sulfamethoxazole/Trimethoprim 1 Each Tablet, 1 EACH PO BID


   Prescribed by: ANDI RILEY on 9/8/18 1627


Sulfamethoxazole/Trimethoprim 1 Each Tablet, 1 EACH PO BID


   Prescribed by: MARTA BOONE on 10/9/19 1100


Warfarin Sodium 5 Mg Tablet, 5 MG PO DAILY@1800


   Prescribed by: LLUVIA GA on 5/9/17 1120





Patient Home Medication List


Home Medication List Reviewed:  Yes





Review of Systems


Review of Systems


Constitutional:  no symptoms reported, see HPI


Skin:  see HPI, other (abscess left flank)





All Other Systems Reviewed


Negative Unless Noted:  Yes





Past Medical-Social-Family Hx


Past Med/Social Hx:  Reviewed Nursing Past Med/Soc Hx


Patient Social History


Alcohol Use:  Denies Use


Recreational Drug Use:  Yes


Drug of Choice:  MARIJUANA-EVERY ONCE IN AWHILE, past hx meth


Smoking Status:  Current Everyday Smoker


Type Used:  Cigarettes


2nd Hand Smoke Exposure:  Yes


Recent Foreign Travel:  No


Contact w/Someone Who Travel:  No


Recent Hopitalizations:  No


Physical Abuse:  No


Sexual Abuse:  No


Mistreated:  No


Fear:  No





Immunizations Up To Date


Tetanus Booster (TDap):  Unknown


PED Vaccines UTD:  Yes





Seasonal Allergies


Seasonal Allergies:  No





Past Medical History


Surgeries:  Yes (EGD)


Respiratory:  Yes


Asthma


Currently Using CPAP:  No


Currently Using BIPAP:  No


Cardiac:  No


Neurological:  Yes


Neuropathy


Reproductive Disorders:  No


Sexually Transmitted Disease:  No


HIV/AIDS:  No


Genitourinary:  Yes


Prostate Problems


Gastrointestinal:  Yes (current constipation)


Gastroesophageal Reflux, Liver Disease/Jaundice


Musculoskeletal:  No


Endocrine:  Yes (Type I)


Diabetes, Insulin dep


HEENT:  No


Cancer:  No


Psychosocial:  Yes


Anxiety, Bipolar, Depression


Integumentary:  Yes


Eczema


Blood Disorders:  No


Adverse Reaction/Blood Tranf:  No





Family Medical History





Diabetes mellitus


  19 FATHER


  UNCLE


FH: COPD (chronic obstructive pulmonary disease)


  19 MOTHER


FH: brain cancer


  AUNT


FH: diverticulitis


  19 FATHER


Thyroid disease


  19 MOTHER


No Pertinent Family Hx





Physical Exam


Vital Signs





Vital Signs - First Documented








 10/14/19





 17:24


 


Temp 36.2


 


Pulse 101


 


Resp 20


 


B/P (MAP) 133/95 (108)


 


Pulse Ox 99





Capillary Refill :


General Appearance:  WD/WN, no apparent distress


Cardiovascular:  normal peripheral pulses, regular rate, rhythm


Respiratory:  chest non-tender, lungs clear


Neurologic/Psychiatric:  alert, normal mood/affect, oriented x 3


Skin:  normal color, warm/dry


Skin Problem Location:  torso (and left flank)


Skin Problem Character:  abscess (3X3 cm with erythema, central fluctuance, 

marked tenderness. )





Procedures/Interventions


I&D :  


   Site:  left flank


   Blade Size:  11


   I & D Procedure:  betadine prep


Progress


Skin cleaned with Betadine, 8 mL's of 1% lidocaine used for local anesthetic, 

I&D performed, proximally 30 ML's of purulent drainage. Culture obtained. Wound 

copiously irrigated with 500 ML's of sterile saline with Hibiclens. Triple 

antibiotic ointment and a bulky sterile dressing applied. Patient tolerated 

procedure well





Progress/Results/Core Measures


Results/Orders


My Orders





Orders - DEMI SWANSON


Lidocaine 1% Inj 20 Ml (Xylocaine 1% Inj (10/14/19 17:30)


Wound Culture (10/14/19 18:01)





Medications Given in ED





Current Medications








 Medications  Dose


 Ordered  Sig/Dara


 Route  Start Time


 Stop Time Status Last Admin


Dose Admin


 


 Lidocaine HCl  20 ml  ONCE  ONCE


 INJ  10/14/19 17:30


 10/14/19 17:32 DC 10/14/19 17:35


20 ML








Vital Signs/I&O











 10/14/19 10/14/19





 17:24 18:18


 


Temp 36.2 


 


Pulse 101 92


 


Resp 20 22


 


B/P (MAP) 133/95 (108) 126/86


 


Pulse Ox 99 98











Departure


Impression





   Primary Impression:  


   Abscess of flank


Disposition:  01 HOME, SELF-CARE


Condition:  Improved





Departure-Patient Inst.


Decision time for Depature:  18:00


Referrals:  


VERONICA MENDES MD (PCP/Family)


Primary Care Physician


Patient Instructions:  Abscess Incision and Drainage (DC)





Add. Discharge Instructions:  


Leave dressing in place for next 12 hours, then irrigate with peroxide and apply

Bactroban ointment. Clean and apply three times daily. 





Take antibiotics as previously prescribed.


Follow-up with Dr. Mendes later this week.


Return to emergency department for new, urgent health care problems.





All discharge instructions reviewed with patient and/or family. Voiced 

understanding.


Scripts


Mupirocin (Mupirocin) 22 Gm Oint...g.


22 GM TP TID, #1 TUBE 1 Refill


   Prov: DEMI SWANSON         10/14/19





Copy


Copies To 1:   VERONICA MENDES MD, AMY ARNP                  Oct 14, 2019 17:36

## 2019-10-28 ENCOUNTER — HOSPITAL ENCOUNTER (EMERGENCY)
Dept: HOSPITAL 75 - ER | Age: 29
Discharge: HOME | End: 2019-10-28
Payer: MEDICAID

## 2019-10-28 VITALS — BODY MASS INDEX: 21.3 KG/M2 | HEIGHT: 73.62 IN | WEIGHT: 164.24 LBS

## 2019-10-28 VITALS — DIASTOLIC BLOOD PRESSURE: 90 MMHG | SYSTOLIC BLOOD PRESSURE: 119 MMHG

## 2019-10-28 DIAGNOSIS — Z80.8: ICD-10-CM

## 2019-10-28 DIAGNOSIS — K21.9: ICD-10-CM

## 2019-10-28 DIAGNOSIS — F41.9: ICD-10-CM

## 2019-10-28 DIAGNOSIS — Z90.89: ICD-10-CM

## 2019-10-28 DIAGNOSIS — Z88.5: ICD-10-CM

## 2019-10-28 DIAGNOSIS — Z79.01: ICD-10-CM

## 2019-10-28 DIAGNOSIS — E10.40: Primary | ICD-10-CM

## 2019-10-28 DIAGNOSIS — E86.0: ICD-10-CM

## 2019-10-28 DIAGNOSIS — J45.909: ICD-10-CM

## 2019-10-28 DIAGNOSIS — F31.9: ICD-10-CM

## 2019-10-28 DIAGNOSIS — R11.2: ICD-10-CM

## 2019-10-28 DIAGNOSIS — F17.210: ICD-10-CM

## 2019-10-28 DIAGNOSIS — Z88.8: ICD-10-CM

## 2019-10-28 DIAGNOSIS — Z79.4: ICD-10-CM

## 2019-10-28 LAB
ALBUMIN SERPL-MCNC: 4.4 GM/DL (ref 3.2–4.5)
ALP SERPL-CCNC: 114 U/L (ref 40–136)
ALT SERPL-CCNC: 12 U/L (ref 0–55)
APTT PPP: YELLOW S
BACTERIA #/AREA URNS HPF: NEGATIVE /HPF
BASOPHILS # BLD AUTO: 0.1 10^3/UL (ref 0–0.1)
BASOPHILS NFR BLD AUTO: 1 % (ref 0–10)
BILIRUB SERPL-MCNC: 0.7 MG/DL (ref 0.1–1)
BILIRUB UR QL STRIP: NEGATIVE
BUN/CREAT SERPL: 11
CALCIUM SERPL-MCNC: 9.7 MG/DL (ref 8.5–10.1)
CHLORIDE SERPL-SCNC: 100 MMOL/L (ref 98–107)
CO2 SERPL-SCNC: 19 MMOL/L (ref 21–32)
CREAT SERPL-MCNC: 1.22 MG/DL (ref 0.6–1.3)
EOSINOPHIL # BLD AUTO: 0.3 10^3/UL (ref 0–0.3)
EOSINOPHIL NFR BLD AUTO: 3 % (ref 0–10)
ERYTHROCYTE [DISTWIDTH] IN BLOOD BY AUTOMATED COUNT: 13.3 % (ref 10–14.5)
FIBRINOGEN PPP-MCNC: CLEAR MG/DL
GFR SERPLBLD BASED ON 1.73 SQ M-ARVRAT: > 60 ML/MIN
GLUCOSE SERPL-MCNC: 341 MG/DL (ref 70–105)
GLUCOSE UR STRIP-MCNC: (no result) MG/DL
HCT VFR BLD CALC: 45 % (ref 40–54)
HGB BLD-MCNC: 16.1 G/DL (ref 13.3–17.7)
KETONES UR QL STRIP: (no result)
LEUKOCYTE ESTERASE UR QL STRIP: NEGATIVE
LYMPHOCYTES # BLD AUTO: 2.9 X 10^3 (ref 1–4)
LYMPHOCYTES NFR BLD AUTO: 30 % (ref 12–44)
MANUAL DIFFERENTIAL PERFORMED BLD QL: NO
MCH RBC QN AUTO: 30 PG (ref 25–34)
MCHC RBC AUTO-ENTMCNC: 36 G/DL (ref 32–36)
MCV RBC AUTO: 85 FL (ref 80–99)
MONOCYTES # BLD AUTO: 0.8 X 10^3 (ref 0–1)
MONOCYTES NFR BLD AUTO: 8 % (ref 0–12)
NEUTROPHILS # BLD AUTO: 5.6 X 10^3 (ref 1.8–7.8)
NEUTROPHILS NFR BLD AUTO: 59 % (ref 42–75)
NITRITE UR QL STRIP: NEGATIVE
PH UR STRIP: 5 [PH] (ref 5–9)
PLATELET # BLD: 328 10^3/UL (ref 130–400)
PMV BLD AUTO: 10.5 FL (ref 7.4–10.4)
POTASSIUM SERPL-SCNC: 4 MMOL/L (ref 3.6–5)
PROT SERPL-MCNC: 7.6 GM/DL (ref 6.4–8.2)
PROT UR QL STRIP: (no result)
RBC #/AREA URNS HPF: (no result) /HPF
SODIUM SERPL-SCNC: 134 MMOL/L (ref 135–145)
SP GR UR STRIP: 1.02 (ref 1.02–1.02)
SQUAMOUS #/AREA URNS HPF: (no result) /HPF
WBC # BLD AUTO: 9.6 10^3/UL (ref 4.3–11)
WBC #/AREA URNS HPF: (no result) /HPF

## 2019-10-28 PROCEDURE — 85025 COMPLETE CBC W/AUTO DIFF WBC: CPT

## 2019-10-28 PROCEDURE — 96374 THER/PROPH/DIAG INJ IV PUSH: CPT

## 2019-10-28 PROCEDURE — 82962 GLUCOSE BLOOD TEST: CPT

## 2019-10-28 PROCEDURE — 96361 HYDRATE IV INFUSION ADD-ON: CPT

## 2019-10-28 PROCEDURE — 81000 URINALYSIS NONAUTO W/SCOPE: CPT

## 2019-10-28 PROCEDURE — 80053 COMPREHEN METABOLIC PANEL: CPT

## 2019-10-28 PROCEDURE — 36415 COLL VENOUS BLD VENIPUNCTURE: CPT

## 2019-10-28 NOTE — ED GI
General


Chief Complaint:  Abdominal/GI Problems


Stated Complaint:  VOMITING


Nursing Triage Note:  


PT STATES NAUSEA AND VOMITING THAT STARTED LAST NIGHT.


Sepsis Screen:  No Definite Risk





History of Present Illness


Date Seen by Provider:  Oct 28, 2019


Time Seen by Provider:  11:10


Initial Comments


29-year-old  male who is an insulin-dependent diabetic using a home 

reports he began vomiting at approximately 2200 yesterday. He last vomited 30 

minutes prior to arrival. He has had nothing to eat or drink this morning and 

has not taken anything for the nausea and vomiting.


Timing/Duration:  12-24 Hours


Severity/Quality:  Mild


Location:  Generalized Abdomen


Associated Symptoms:  No Back Pain, No Chest Pain, No Diaphoresis, No 

Fever/Chills, No Fatigue, No Headache, No Heartburn; Nausea/Vomiting; No Rash, 

No Shortness of Air, No Swelling/Mass in Abdomen, No Syncope; Weakness





Allergies and Home Medications


Allergies


Coded Allergies:  


     fluoxetine (Verified  Allergy, Unknown, 6/22/09)


     trazodone (Unverified  Allergy, Unknown, 11/30/14)





Home Medications


Bisacodyl 5 Mg Tablet.dr, 5 MG PO DAILY PRN for CONSTIPATION-1ST LINE


   Prescribed by: LLUVIA GA on 5/9/17 1120


Cephalexin 500 Mg Capsule, 500 MG PO TID


   Prescribed by: MARTA BOONE on 7/3/19 1657


Hydrocodone Bit/Acetaminophen 1 Each Tablet, 1-2 TAB PO Q6H PRN for PAIN-

MODERATE TO SEVERE


   Prescribed by: LLUVIA GA on 5/9/17 1120


Insulin Regular, Human 100 Unit/1 Ml Vial, 10-15 UNIT SQ AC, (Reported)


   10-15 UNITS BASED ON CARB COUNTING 


Mupirocin 22 Gm Oint...g., 22 GM TP TID


   Prescribed by: DEMI SWANSON on 10/14/19 1805


Ondansetron 4 Mg Tab.rapdis, 4 MG PO Q6H PRN for NAUSEA/VOMITING


   Prescribed by: DEMI SWANSON on 10/28/19 1312


Sulfamethoxazole/Trimethoprim 1 Each Tablet, 1 EACH PO BID


   Prescribed by: ANDI RILEY on 9/8/18 1627


Sulfamethoxazole/Trimethoprim 1 Each Tablet, 1 EACH PO BID


   Prescribed by: MARTA BOONE on 10/9/19 1100


Warfarin Sodium 5 Mg Tablet, 5 MG PO DAILY@1800


   Prescribed by: LLUIVA GA on 5/9/17 1120





Patient Home Medication List


Home Medication List Reviewed:  Yes





Review of Systems


Review of Systems


Constitutional:  no symptoms reported, see HPI


Gastrointestinal:  See HPI, Abdominal Pain; Denies Constipated, Denies Diarrhea,

Denies Difficulty Swallowing; Nausea, Poor Appetite, Poor Fluid Intake, Vomiting





All Other Systems Reviewed


Negative Unless Noted:  Yes





Past Medical-Social-Family Hx


Past Med/Social Hx:  Reviewed Nursing Past Med/Soc Hx


Patient Social History


Alcohol Use:  Denies Use


Recreational Drug Use:  Yes


Drug of Choice:  MARIJUANA-EVERY ONCE IN AWHILE, past hx meth


Smoking Status:  Current Someday Smoker


Type Used:  Cigarettes


2nd Hand Smoke Exposure:  Yes


Recent Foreign Travel:  No


Contact w/Someone Who Travel:  No


Recent Infectious Disease Expo:  No


Recent Hopitalizations:  No


Physical Abuse:  No


Sexual Abuse:  No


Mistreated:  No


Fear:  No





Immunizations Up To Date


Tetanus Booster (TDap):  Unknown


PED Vaccines UTD:  Yes





Seasonal Allergies


Seasonal Allergies:  No





Past Medical History


Surgeries:  Yes (EGD, LT HIP FX)


Orthopedic, Tonsillectomy


Respiratory:  Yes


Asthma


Currently Using CPAP:  No


Currently Using BIPAP:  No


Cardiac:  No


Neurological:  Yes


Neuropathy


Reproductive Disorders:  No


Sexually Transmitted Disease:  No


HIV/AIDS:  No


Genitourinary:  Yes


Prostate Problems


Gastrointestinal:  Yes (current constipation)


Gastroesophageal Reflux, Liver Disease/Jaundice


Musculoskeletal:  No


Endocrine:  Yes (Type I)


Diabetes, Insulin dep


HEENT:  No


Cancer:  No


Psychosocial:  Yes


Anxiety, Bipolar, Depression


Integumentary:  Yes


Eczema


Blood Disorders:  No


Adverse Reaction/Blood Tranf:  No





Family Medical History





Diabetes mellitus


  19 FATHER


  UNCLE


FH: COPD (chronic obstructive pulmonary disease)


  19 MOTHER


FH: brain cancer


  AUNT


FH: diverticulitis


  19 FATHER


Thyroid disease


  19 MOTHER


No Pertinent Family Hx





Physical Exam


Vital Signs





Vital Signs - First Documented








 10/28/19





 11:10


 


Temp 35.7


 


Pulse 116


 


Resp 20


 


B/P (MAP) 129/98 (108)


 


Pulse Ox 96


 


O2 Delivery Room Air





Capillary Refill : Less Than 3 Seconds


Height/Weight/BMI


Height: 5'10.00"


Weight: 160lbs. 3.0oz. [id number]; 21.00 BMI


Method:Stated


General Appearance:  WD/WN, no apparent distress


HEENT:  PERRL/EOMI, pharynx normal, other (oromucosa pink and dry)


Neck:  non-tender, full range of motion, supple, normal inspection


Respiratory:  chest non-tender, lungs clear, normal breath sounds


Cardiovascular:  normal peripheral pulses, regular rate, rhythm, no edema, no 

murmur


Gastrointestinal:  normal bowel sounds, non tender, soft


Extremities:  normal range of motion, non-tender, normal inspection


Neurologic/Psychiatric:  no motor/sensory deficits, alert, normal mood/affect, 

oriented x 3


Skin:  normal color, warm/dry





Progress/Results/Core Measures


Results/Orders


Lab Results





Laboratory Tests








Test


 10/28/19


11:15 10/28/19


11:18 10/28/19


13:12 Range/Units


 


 


White Blood Count


 9.6 


 


 


 4.3-11.0


10^3/uL


 


Red Blood Count


 5.36 


 


 


 4.35-5.85


10^6/uL


 


Hemoglobin 16.1    13.3-17.7  G/DL


 


Hematocrit 45    40-54  %


 


Mean Corpuscular Volume 85    80-99  FL


 


Mean Corpuscular Hemoglobin 30    25-34  PG


 


Mean Corpuscular Hemoglobin


Concent 36 


 


 


 32-36  G/DL





 


Red Cell Distribution Width 13.3    10.0-14.5  %


 


Platelet Count


 328 


 


 


 130-400


10^3/uL


 


Mean Platelet Volume 10.5 H   7.4-10.4  FL


 


Neutrophils (%) (Auto) 59    42-75  %


 


Lymphocytes (%) (Auto) 30    12-44  %


 


Monocytes (%) (Auto) 8    0-12  %


 


Eosinophils (%) (Auto) 3    0-10  %


 


Basophils (%) (Auto) 1    0-10  %


 


Neutrophils # (Auto) 5.6    1.8-7.8  X 10^3


 


Lymphocytes # (Auto) 2.9    1.0-4.0  X 10^3


 


Monocytes # (Auto) 0.8    0.0-1.0  X 10^3


 


Eosinophils # (Auto)


 0.3 


 


 


 0.0-0.3


10^3/uL


 


Basophils # (Auto)


 0.1 


 


 


 0.0-0.1


10^3/uL


 


Sodium Level 134 L   135-145  MMOL/L


 


Potassium Level 4.0    3.6-5.0  MMOL/L


 


Chloride Level 100      MMOL/L


 


Carbon Dioxide Level 19 L   21-32  MMOL/L


 


Anion Gap 15 H   5-14  MMOL/L


 


Blood Urea Nitrogen 14    7-18  MG/DL


 


Creatinine


 1.22 


 


 


 0.60-1.30


MG/DL


 


Estimat Glomerular Filtration


Rate > 60 


 


 


  





 


BUN/Creatinine Ratio 11     


 


Glucose Level 341 H     MG/DL


 


Glucometer 307 H  129 H   MG/DL


 


Calcium Level 9.7    8.5-10.1  MG/DL


 


Corrected Calcium 9.4    8.5-10.1  MG/DL


 


Total Bilirubin 0.7    0.1-1.0  MG/DL


 


Aspartate Amino Transf


(AST/SGOT) 18 


 


 


 5-34  U/L





 


Alanine Aminotransferase


(ALT/SGPT) 12 


 


 


 0-55  U/L





 


Alkaline Phosphatase 114      U/L


 


Total Protein 7.6    6.4-8.2  GM/DL


 


Albumin 4.4    3.2-4.5  GM/DL


 


Urine Color  YELLOW    


 


Urine Clarity  CLEAR    


 


Urine pH  5   5-9  


 


Urine Specific Gravity  1.020   1.016-1.022  


 


Urine Protein  1+ H  NEGATIVE  


 


Urine Glucose (UA)  4+ H  NEGATIVE  


 


Urine Ketones  4+ H  NEGATIVE  


 


Urine Nitrite  NEGATIVE   NEGATIVE  


 


Urine Bilirubin  NEGATIVE   NEGATIVE  


 


Urine Urobilinogen  NORMAL   NORMAL  MG/DL


 


Urine Leukocyte Esterase  NEGATIVE   NEGATIVE  


 


Urine RBC (Auto)  NEGATIVE   NEGATIVE  


 


Urine RBC  RARE    /HPF


 


Urine WBC  RARE    /HPF


 


Urine Squamous Epithelial


Cells 


 2-5 


 


  /HPF





 


Urine Crystals  NONE    /LPF


 


Urine Bacteria  NEGATIVE    /HPF


 


Urine Casts  NONE    /LPF


 


Urine Mucus  NEGATIVE    /LPF


 


Urine Culture Indicated  NO    








My Orders





Orders - DEMI SWANSON


Accucheck Stat ONCE (10/28/19 11:04)


Cbc With Automated Diff (10/28/19 11:04)


Comprehensive Metabolic Panel (10/28/19 11:04)


Ua Culture If Indicated (10/28/19 11:04)


Ed Iv/Invasive Line Start (10/28/19 11:18)


Ns Iv 1000 Ml (Sodium Chloride 0.9%) (10/28/19 11:18)


Ondansetron Injection (Zofran Injectio (10/28/19 11:30)


Ed Iv/Invasive Line Start (10/28/19 12:25)


Ns Iv 1000 Ml (Sodium Chloride 0.9%) (10/28/19 12:25)


Accucheck Stat ONCE (10/28/19 12:46)





Medications Given in ED





Current Medications








 Medications  Dose


 Ordered  Sig/Dara


 Route  Start Time


 Stop Time Status Last Admin


Dose Admin


 


 Ondansetron HCl  4 mg  ONCE  ONCE


 IVP  10/28/19 11:30


 10/28/19 11:31 DC 10/28/19 11:49


4 MG








Vital Signs/I&O











 10/28/19 10/28/19





 11:10 13:26


 


Temp 35.7 35.7


 


Pulse 116 89


 


Resp 20 20


 


B/P (MAP) 129/98 (108) 119/90 (108)


 


Pulse Ox 96 96


 


O2 Delivery Room Air Room Air














Blood Pressure Mean:                    108


POS








FSBG Bedside Testing


Finger Stick Blood Glucose:  307


Blood Glucose Action Taken:  DEMI SWANSON INFORMED





Progress


Progress Note :  


   Time:  11:10


Progress Note


Patient seen and evaluated, will obtain labs, normal saline 1 L per IV and 

Zofran 4 mg IV for nausea.


1200 patient reports nausea is improving, taking sips of water. Ketones 4+.


1215 Will give second liter of normal saline per IV.


1230 patient drinking water, no nausea or vomiting.


1300 Accu-Chek 129. Patient reports to be feeling much better. Has urinated one 

time.


1310 discharge instructions and return precautions reviewed with patient. All qu

estions answered.





Departure


Impression





   Primary Impression:  


   Type I diabetes mellitus


   Qualified Codes:  E10.9 - Type 1 diabetes mellitus without complications


   Additional Impressions:  


   Nausea and vomiting


   Qualified Codes:  R11.2 - Nausea with vomiting, unspecified


   Dehydration


Disposition:  01 HOME, SELF-CARE


Condition:  Improved





Departure-Patient Inst.


Decision time for Depature:  13:10


Referrals:  


VERONICA MENDES MD (PCP/Family)


Primary Care Physician


Patient Instructions:  Diabetes Type 1, Adult (DC), Nausea and Vomiting, Adult 

(DC)





Add. Discharge Instructions:  


Clear liquids diet for the next 4-6 hours, advance to bland diet as tolerated.


No greasy, fried or spicy foods today.


Continue to monitor your blood glucose every 2-3 hours and adjust her insulin as

needed.


Use Zofran every 6 hours as needed for nausea and vomiting.


Follow-up with your primary care provider in 2-3 days if symptoms do not improve

or worsen.


Return to the emergency department for persistent nausea and vomiting despite 

the Zofran, fever greater than 101, or new concerns.





All discharge instructions reviewed with patient and/or family. Voiced underst

anding.


Scripts


Ondansetron (Ondansetron Odt) 4 Mg Tab.rapdis


4 MG PO Q6H PRN for NAUSEA/VOMITING, #8 TAB 0 Refills


   Prov: DEMI SWANSON         10/28/19











DEMI SWANSON                  Oct 28, 2019 12:03


POS

## 2019-11-02 ENCOUNTER — HOSPITAL ENCOUNTER (EMERGENCY)
Dept: HOSPITAL 75 - ER | Age: 29
Discharge: HOME | End: 2019-11-02
Payer: MEDICAID

## 2019-11-02 VITALS — BODY MASS INDEX: 24.04 KG/M2 | WEIGHT: 166.01 LBS | HEIGHT: 69.69 IN

## 2019-11-02 VITALS — DIASTOLIC BLOOD PRESSURE: 90 MMHG | SYSTOLIC BLOOD PRESSURE: 142 MMHG

## 2019-11-02 DIAGNOSIS — Z88.8: ICD-10-CM

## 2019-11-02 DIAGNOSIS — E10.40: ICD-10-CM

## 2019-11-02 DIAGNOSIS — F41.9: ICD-10-CM

## 2019-11-02 DIAGNOSIS — K21.9: ICD-10-CM

## 2019-11-02 DIAGNOSIS — Z87.891: ICD-10-CM

## 2019-11-02 DIAGNOSIS — J45.909: ICD-10-CM

## 2019-11-02 DIAGNOSIS — Z79.4: ICD-10-CM

## 2019-11-02 DIAGNOSIS — F31.9: ICD-10-CM

## 2019-11-02 DIAGNOSIS — Z80.8: ICD-10-CM

## 2019-11-02 DIAGNOSIS — Z79.01: ICD-10-CM

## 2019-11-02 DIAGNOSIS — L02.211: Primary | ICD-10-CM

## 2019-11-02 DIAGNOSIS — Z88.5: ICD-10-CM

## 2019-11-02 DIAGNOSIS — Z90.49: ICD-10-CM

## 2019-11-02 DIAGNOSIS — Z77.22: ICD-10-CM

## 2019-11-02 PROCEDURE — 87205 SMEAR GRAM STAIN: CPT

## 2019-11-02 PROCEDURE — 10060 I&D ABSCESS SIMPLE/SINGLE: CPT

## 2019-11-02 PROCEDURE — 87077 CULTURE AEROBIC IDENTIFY: CPT

## 2019-11-02 PROCEDURE — 87070 CULTURE OTHR SPECIMN AEROBIC: CPT

## 2019-11-02 PROCEDURE — 87186 SC STD MICRODIL/AGAR DIL: CPT

## 2019-11-02 NOTE — ED INTEGUMENTARY GENERAL
General


Chief Complaint:  Skin/Wound Problems


Stated Complaint:  BACK ABSCESS


Nursing Triage Note:  


ABSCESS TO BACK X2 DAYS.


Source:  patient


Exam Limitations:  no limitations





History of Present Illness


Date Seen by Provider:  Nov 2, 2019


Time Seen by Provider:  13:46


Initial Comments


To ER with an abscess to the right flank for about 3-4 days, recently here for 

an abscess to the left flank. That wound has healed. He denies fevers chills or 

systemic symptoms. Insulin-dependent diabetic.


Timing/Duration:  just prior to arrival


Severity:  moderate


Associated Symptoms:  denies symptoms





Allergies and Home Medications


Allergies


Coded Allergies:  


     fluoxetine (Verified  Allergy, Unknown, 6/22/09)


     trazodone (Unverified  Allergy, Unknown, 11/30/14)





Home Medications


Bisacodyl 5 Mg Tablet.dr, 5 MG PO DAILY PRN for CONSTIPATION-1ST LINE


   Prescribed by: LLUVIA GA on 5/9/17 1120


Insulin Regular, Human 100 Unit/1 Ml Vial, 10-15 UNIT SQ AC, (Reported)


   10-15 UNITS BASED ON CARB COUNTING 


Mupirocin 22 Gm Oint...g., 22 GM TP TID


   Prescribed by: DEMI SWANSON on 10/14/19 1805


Warfarin Sodium 5 Mg Tablet, 5 MG PO DAILY@1800


   Prescribed by: LLUVIA GA on 5/9/17 1120





Patient Home Medication List


Home Medication List Reviewed:  Yes





Review of Systems


Review of Systems


Constitutional:  see HPI


EENTM:  see HPI


Respiratory:  no symptoms reported


Cardiovascular:  no symptoms reported


Genitourinary:  no symptoms reported


Musculoskeletal:  no symptoms reported


Skin:  no symptoms reported


Psychiatric/Neurological:  No Symptoms Reported


Endocrine:  No Symptoms Reported


Hematologic/Lymphatic:  No Symptoms Reported





Past Medical-Social-Family Hx


Patient Social History


Alcohol Use:  Denies Use


Recreational Drug Use:  No


Drug of Choice:  MARIJUANA-EVERY ONCE IN AWHILE, past hx meth


Smoking Status:  Former Smoker


Type Used:  Cigarettes


2nd Hand Smoke Exposure:  Yes


Recent Foreign Travel:  No


Contact w/Someone Who Travel:  No


Recent Infectious Disease Expo:  No


Recent Hopitalizations:  No





Immunizations Up To Date


Tetanus Booster (TDap):  Unknown


PED Vaccines UTD:  Yes





Seasonal Allergies


Seasonal Allergies:  No





Past Medical History


Surgeries:  Yes (EGD, LT HIP FX)


Orthopedic, Tonsillectomy


Respiratory:  Yes


Asthma


Currently Using CPAP:  No


Currently Using BIPAP:  No


Cardiac:  No


Neurological:  Yes


Neuropathy


Reproductive Disorders:  No


Sexually Transmitted Disease:  No


HIV/AIDS:  No


Genitourinary:  Yes


Prostate Problems


Gastrointestinal:  Yes (current constipation)


Gastroesophageal Reflux, Liver Disease/Jaundice


Musculoskeletal:  No


Endocrine:  Yes (Type I)


Diabetes, Insulin dep


HEENT:  No


Cancer:  No


Psychosocial:  Yes


Anxiety, Bipolar, Depression


Integumentary:  Yes


Eczema


Blood Disorders:  No


Adverse Reaction/Blood Tranf:  No





Family Medical History





Diabetes mellitus


  19 FATHER


  UNCLE


FH: COPD (chronic obstructive pulmonary disease)


  19 MOTHER


FH: brain cancer


  AUNT


FH: diverticulitis


  19 FATHER


Thyroid disease


  19 MOTHER


No Pertinent Family Hx





Physical Exam


Vital Signs





Vital Signs - First Documented








 11/2/19





 13:19


 


Temp 36.5


 


Pulse 93


 


Resp 16


 


B/P (MAP) 142/90 (107)


 


Pulse Ox 98


 


O2 Delivery Room Air





Capillary Refill : Less Than 3 Seconds


General Appearance:  WD/WN, no apparent distress


HEENT:  PERRL/EOMI, normal ENT inspection


Neck:  non-tender, full range of motion


Respiratory:  normal breath sounds, no respiratory distress, no accessory muscle

use


Gastrointestinal:  normal bowel sounds, non tender


Neurologic/Psychiatric:  alert, normal mood/affect, oriented x 3


Skin:  normal color, warm/dry


Skin Problem Location:  torso (right flank about 3 cm circular fluctuant)


Skin Problem Character:  abscess





Procedures/Interventions


I&D :  


   Blade Size:  11





Progress/Results/Core Measures


Results/Orders


My Orders





Orders - MARTA BOONE


Wound Culture (11/2/19 13:28)


Hydrocodone/Apap 5/325 Tablet (Lortab 5 (11/2/19 13:30)


Sulfamethoxazole/Trimet Ds Tab (Bactrim (11/2/19 13:30)


Lidocaine 1% Inj 20 Ml (Xylocaine 1% Inj (11/2/19 13:30)


Sodium Bicarbonate 8.4% Vial (Sodium Bic (11/2/19 13:30)





Medications Given in ED





Current Medications








 Medications  Dose


 Ordered  Sig/Dara


 Route  Start Time


 Stop Time Status Last Admin


Dose Admin


 


 Acetaminophen/


 Hydrocodone Bitart  1 tab  ONCE  ONCE


 PO  11/2/19 13:30


 11/2/19 13:31 DC 11/2/19 13:36


1 TAB


 


 Lidocaine HCl  20 ml  ONCE  ONCE


 INJ  11/2/19 13:30


 11/2/19 13:31 DC 11/2/19 13:37


20 ML


 


 Sodium Bicarbonate  50 meq  ONCE  ONCE


 IR  11/2/19 13:30


 11/2/19 13:31 DC 11/2/19 13:37


50 MEQ


 


 Trimethoprim/


 Sulfamethoxazole  1 ea  ONCE  ONCE


 PO  11/2/19 13:30


 11/2/19 13:31 DC 11/2/19 13:36


1 EA








Vital Signs/I&O











 11/2/19





 13:19


 


Temp 36.5


 


Pulse 93


 


Resp 16


 


B/P (MAP) 142/90 (107)


 


Pulse Ox 98


 


O2 Delivery Room Air














Blood Pressure Mean:                    107


POS








Departure


Impression





   Primary Impression:  


   Abscess


Disposition:  01 HOME, SELF-CARE


Condition:  Stable





Departure-Patient Inst.


Decision time for Depature:  13:48


Referrals:  


VERONICA MENDES MD (PCP/Family)


Primary Care Physician


Patient Instructions:  Abscess Incision and Drainage (DC)





Add. Discharge Instructions:  


1. Antibiotic as directed return to ER for any concerns


2. Follow-up with your doctor next week


3. All discharge instructions reviewed with patient and/or family. Voiced 

understanding.


Scripts


Doxycycline Hyclate (Doxycycline Hyclate) 100 Mg Tablet


100 MG PO BID, #20 TAB 0 Refills


   Prov: MARTA BOONE         11/2/19











MARTA BOONE              Nov 2, 2019 13:49


POS

## 2019-11-18 ENCOUNTER — HOSPITAL ENCOUNTER (EMERGENCY)
Dept: HOSPITAL 75 - ER | Age: 29
Discharge: HOME | End: 2019-11-18
Payer: MEDICAID

## 2019-11-18 VITALS — SYSTOLIC BLOOD PRESSURE: 133 MMHG | DIASTOLIC BLOOD PRESSURE: 74 MMHG

## 2019-11-18 VITALS — WEIGHT: 166.01 LBS | HEIGHT: 71.97 IN | BODY MASS INDEX: 22.48 KG/M2

## 2019-11-18 DIAGNOSIS — J20.9: ICD-10-CM

## 2019-11-18 DIAGNOSIS — E10.40: ICD-10-CM

## 2019-11-18 DIAGNOSIS — Z88.8: ICD-10-CM

## 2019-11-18 DIAGNOSIS — J06.9: Primary | ICD-10-CM

## 2019-11-18 DIAGNOSIS — Z88.5: ICD-10-CM

## 2019-11-18 DIAGNOSIS — Z90.89: ICD-10-CM

## 2019-11-18 DIAGNOSIS — K21.9: ICD-10-CM

## 2019-11-18 DIAGNOSIS — F31.9: ICD-10-CM

## 2019-11-18 DIAGNOSIS — J45.909: ICD-10-CM

## 2019-11-18 DIAGNOSIS — Z77.22: ICD-10-CM

## 2019-11-18 DIAGNOSIS — Z79.01: ICD-10-CM

## 2019-11-18 DIAGNOSIS — Z80.8: ICD-10-CM

## 2019-11-18 DIAGNOSIS — F41.9: ICD-10-CM

## 2019-11-18 DIAGNOSIS — Z79.4: ICD-10-CM

## 2019-11-18 LAB
ALBUMIN SERPL-MCNC: 4.2 GM/DL (ref 3.2–4.5)
ALP SERPL-CCNC: 118 U/L (ref 40–136)
ALT SERPL-CCNC: 13 U/L (ref 0–55)
BASOPHILS # BLD AUTO: 0 10^3/UL (ref 0–0.1)
BASOPHILS NFR BLD AUTO: 0 % (ref 0–10)
BILIRUB SERPL-MCNC: 0.3 MG/DL (ref 0.1–1)
BUN/CREAT SERPL: 7
CALCIUM SERPL-MCNC: 9.2 MG/DL (ref 8.5–10.1)
CHLORIDE SERPL-SCNC: 108 MMOL/L (ref 98–107)
CO2 SERPL-SCNC: 20 MMOL/L (ref 21–32)
CREAT SERPL-MCNC: 0.85 MG/DL (ref 0.6–1.3)
EOSINOPHIL # BLD AUTO: 0.8 10^3/UL (ref 0–0.3)
EOSINOPHIL NFR BLD AUTO: 8 % (ref 0–10)
ERYTHROCYTE [DISTWIDTH] IN BLOOD BY AUTOMATED COUNT: 13.3 % (ref 10–14.5)
GFR SERPLBLD BASED ON 1.73 SQ M-ARVRAT: > 60 ML/MIN
GLUCOSE SERPL-MCNC: 77 MG/DL (ref 70–105)
HCT VFR BLD CALC: 43 % (ref 40–54)
HGB BLD-MCNC: 15 G/DL (ref 13.3–17.7)
LYMPHOCYTES # BLD AUTO: 5.1 X 10^3 (ref 1–4)
LYMPHOCYTES NFR BLD AUTO: 49 % (ref 12–44)
MANUAL DIFFERENTIAL PERFORMED BLD QL: NO
MCH RBC QN AUTO: 30 PG (ref 25–34)
MCHC RBC AUTO-ENTMCNC: 35 G/DL (ref 32–36)
MCV RBC AUTO: 85 FL (ref 80–99)
MONOCYTES # BLD AUTO: 0.8 X 10^3 (ref 0–1)
MONOCYTES NFR BLD AUTO: 7 % (ref 0–12)
NEUTROPHILS # BLD AUTO: 3.8 X 10^3 (ref 1.8–7.8)
NEUTROPHILS NFR BLD AUTO: 36 % (ref 42–75)
PLATELET # BLD: 319 10^3/UL (ref 130–400)
PMV BLD AUTO: 10.2 FL (ref 7.4–10.4)
POTASSIUM SERPL-SCNC: 3.5 MMOL/L (ref 3.6–5)
PROT SERPL-MCNC: 7.6 GM/DL (ref 6.4–8.2)
SODIUM SERPL-SCNC: 141 MMOL/L (ref 135–145)
WBC # BLD AUTO: 10.5 10^3/UL (ref 4.3–11)

## 2019-11-18 PROCEDURE — 94640 AIRWAY INHALATION TREATMENT: CPT

## 2019-11-18 PROCEDURE — 71046 X-RAY EXAM CHEST 2 VIEWS: CPT

## 2019-11-18 PROCEDURE — 82962 GLUCOSE BLOOD TEST: CPT

## 2019-11-18 PROCEDURE — 36415 COLL VENOUS BLD VENIPUNCTURE: CPT

## 2019-11-18 PROCEDURE — 87804 INFLUENZA ASSAY W/OPTIC: CPT

## 2019-11-18 PROCEDURE — 85025 COMPLETE CBC W/AUTO DIFF WBC: CPT

## 2019-11-18 PROCEDURE — 80053 COMPREHEN METABOLIC PANEL: CPT

## 2019-11-18 NOTE — ED RESPIRATORY
General


Chief Complaint:  Respiratory Problems


Stated Complaint:  TROUBLE BREATHING


Source:  patient


Exam Limitations:  no limitations


 (ALISSA ALATORRE [facility])





History of Present Illness


Date Seen by Provider:  Nov 18, 2019


Time Seen by Provider:  02:01


Initial Comments


pt is a 28 y/o white male w/ PMH of Insulin dependent DMI who presents to the ED

w/  SOA and general chest pressure x 2hrs. Pt states he has been having 

productive cough w/ "milky-white to green-joanna" mucus, chills feeling 

"hot-and-cold", nasal congestion, and rhinorrhea. states his sx have worsened 

over the past day and a half and the current SOA onset just 2 hrs PTA and has 

worsened since onset. States his most recent blood sugar level (few hrs prior to

onset of sx) was "260s".  Has taken Mucinex with some relief.





Pt has hx of smoking but states he quit a month ago. Denies ETOH use but does 

admit to occasional Marijuana use (last used 1 month ago). Has hx of asthma but 

states he has not had exacerbations in years.


Timing/Duration:  other (SOA onset 2hrs  ago)


Severity:  moderate


Prior Episodes/Possible Cause:  occasional episodes


Modifying Factors:  Improves With Albuterol Nebulizer


Associated Symptoms:  cough, earache, fever/chills, nasal congestion, nasal 

drainage, shortness of breath, wheezing, other (chest pressure) 

(ALISSA ALATORRE [facility])





Allergies and Home Medications


Allergies


Coded Allergies:  


     fluoxetine (Verified  Allergy, Unknown, 6/22/09)


     trazodone (Unverified  Allergy, Unknown, 11/30/14)





Home Medications


Bisacodyl 5 Mg Tablet.dr, 5 MG PO DAILY PRN for CONSTIPATION-1ST LINE


   Prescribed by: LLUVIA GA on 5/9/17 1120


Doxycycline Hyclate 100 Mg Tablet, 100 MG PO BID


   Prescribed by: MARTA BOONE on 11/2/19 1349


Insulin Regular, Human 100 Unit/1 Ml Vial, 10-15 UNIT SQ AC, (Reported)


   10-15 UNITS BASED ON CARB COUNTING 


Mupirocin 22 Gm Oint...g., 22 GM TP TID


   Prescribed by: DEMI WSANSON on 10/14/19 1805


Warfarin Sodium 5 Mg Tablet, 5 MG PO DAILY@1800


   Prescribed by: LLUVIA GA on 5/9/17 1120





Patient Home Medication List


Home Medication List Reviewed:  Yes


 (MARY LE MD)





Review of Systems


Review of Systems


Constitutional:  chills, fever (subjective)


EENTM:  ear pain (bilat), nose congestion


Respiratory:  cough, phlegm, short of breath, wheezing


Cardiovascular:  No chest pain, No edema, No palpitations, No syncope


Gastrointestinal:  No abdominal pain, No constipation, No diarrhea, No 

hematemesis, No nausea, No vomiting


Genitourinary:  no symptoms reported


Musculoskeletal:  no symptoms reported


Skin:  No pruritus, No rash


Psychiatric/Neurological:  No Symptoms Reported (ALISSA ALATORRE [facility])





All Other Systems Reviewed


Negative Unless Noted:  Yes


 (MARY LE MD)





Past Medical-Social-Family Hx


Past Med/Social Hx:  Reviewed Nursing Past Med/Soc Hx


 (MARY LE MD)


Patient Social History


Drug of Choice:  MARIJUANA-EVERY ONCE IN AWHILE, past hx meth


Type Used:  Cigarettes


2nd Hand Smoke Exposure:  Yes


Recent Foreign Travel:  No


Contact w/Someone Who Travel:  No


Recent Hopitalizations:  No


 (ALISSA ALATORRE [facility])





Immunizations Up To Date


Tetanus Booster (TDap):  Unknown


PED Vaccines UTD:  Yes


 (ALISSA ALATORRE [facility])





Seasonal Allergies


Seasonal Allergies:  No


 (ALISSA ALATORRE [facility])





Past Medical History


Surgeries:  Yes (EGD, LT HIP FX)


Orthopedic, Tonsillectomy


Respiratory:  Yes


Asthma


Currently Using CPAP:  No


Currently Using BIPAP:  No


Cardiac:  No


Neurological:  Yes


Neuropathy


Reproductive Disorders:  No


Sexually Transmitted Disease:  No


HIV/AIDS:  No


Genitourinary:  Yes


Prostate Problems


Gastrointestinal:  Yes (current constipation)


Gastroesophageal Reflux, Liver Disease/Jaundice


Musculoskeletal:  No


Endocrine:  Yes (Type I)


Diabetes, Insulin dep


HEENT:  No


Cancer:  No


Psychosocial:  Yes


Anxiety, Bipolar, Depression


Integumentary:  Yes


Eczema


Blood Disorders:  No


Adverse Reaction/Blood Tranf:  No


 (ALISSA ALATORRE [facility])





Family Medical History


Reviewed Nursing Family Hx


 (MARY LE MD)





Diabetes mellitus


  19 FATHER


  UNCLE


FH: COPD (chronic obstructive pulmonary disease)


  19 MOTHER


FH: brain cancer


  AUNT


FH: diverticulitis


  19 FATHER


Thyroid disease


  19 MOTHER


No Pertinent Family Hx


 (ALISSA ALATORRE [facility])





Physical Exam





Vital Signs - First Documented








 11/18/19 11/18/19





 02:05 02:19


 


Temp 35.9 


 


Pulse 107 


 


Resp 20 


 


B/P (MAP) 118/95 (103) 


 


Pulse Ox  95


 


O2 Delivery Room Air 








 (MARY LE MD)


Capillary Refill :  


 (ALISSA ALATORRE [facility])


Height: 5'10.00"


Weight: 160lbs. 3.0oz. [id number]; 24.00 BMI


Method:Stated


General Appearance:  WD/WN, moderate distress


Eyes:  Bilateral Eye Normal Inspection, Bilateral Eye PERRL, Bilateral Eye EOMI


HEENT:  PERRL/EOMI, normal ENT inspection, TMs normal, pharynx normal


Neck:  non-tender, full range of motion, supple, normal inspection


Respiratory:  chest non-tender, no accessory muscle use, respiratory distress; 

No crackles, No rales; wheezing, expiration


Cardiovascular:  normal peripheral pulses, regular rate, rhythm, no edema, no 

gallop, no JVD, no murmur


Gastrointestinal:  normal bowel sounds, non tender, soft, no organomegaly, no 

pulsatile mass


Extremities:  normal inspection, no pedal edema


Neurologic/Psychiatric:  alert, oriented x 3


Skin:  normal color, warm/dry


Lymphatic:  no adenopathy (ALISSA ALATORRE [facility])


General Appearance:  WD/WN, mild distress


Neck:  full range of motion, supple


Respiratory:  respiratory distress, wheezing, expiration


Cardiovascular:  no murmur, tachycardia


Gastrointestinal:  non tender, soft


Neurologic/Psychiatric:  alert, oriented x 3


Skin:  normal color, warm/dry (MARY LE MD)





Progress/Results/Core Measures


Suspected Sepsis


SIRS


Temperature: 


Pulse:  


Respiratory Rate: 


 


Laboratory Tests


11/18/19 02:27: 


Blood Pressure  / 


Mean: 


 





Laboratory Tests


11/18/19 02:27: 


 (ALISSA ALATORRE [facility])





Results/Orders


Lab Results





Laboratory Tests








Test


 11/18/19


02:09 11/18/19


02:27 Range/Units


 


 


Glucometer 91     MG/DL


 


White Blood Count


 


 10.5 


 4.3-11.0


10^3/uL


 


Red Blood Count


 


 5.06 


 4.35-5.85


10^6/uL


 


Hemoglobin  15.0  13.3-17.7  G/DL


 


Hematocrit  43  40-54  %


 


Mean Corpuscular Volume  85  80-99  FL


 


Mean Corpuscular Hemoglobin  30  25-34  PG


 


Mean Corpuscular Hemoglobin


Concent 


 35 


 32-36  G/DL





 


Red Cell Distribution Width  13.3  10.0-14.5  %


 


Platelet Count


 


 319 


 130-400


10^3/uL


 


Mean Platelet Volume  10.2  7.4-10.4  FL


 


Neutrophils (%) (Auto)  36 L 42-75  %


 


Lymphocytes (%) (Auto)  49 H 12-44  %


 


Monocytes (%) (Auto)  7  0-12  %


 


Eosinophils (%) (Auto)  8  0-10  %


 


Basophils (%) (Auto)  0  0-10  %


 


Neutrophils # (Auto)  3.8  1.8-7.8  X 10^3


 


Lymphocytes # (Auto)  5.1 H 1.0-4.0  X 10^3


 


Monocytes # (Auto)  0.8  0.0-1.0  X 10^3


 


Eosinophils # (Auto)


 


 0.8 H


 0.0-0.3


10^3/uL


 


Basophils # (Auto)


 


 0.0 


 0.0-0.1


10^3/uL


 


Sodium Level  141  135-145  MMOL/L


 


Potassium Level  3.5 L 3.6-5.0  MMOL/L


 


Chloride Level  108 H   MMOL/L


 


Carbon Dioxide Level  20 L 21-32  MMOL/L


 


Anion Gap  13  5-14  MMOL/L


 


Blood Urea Nitrogen  6 L 7-18  MG/DL


 


Creatinine


 


 0.85 


 0.60-1.30


MG/DL


 


Estimat Glomerular Filtration


Rate 


 > 60 


  





 


BUN/Creatinine Ratio  7   


 


Glucose Level  77    MG/DL


 


Calcium Level  9.2  8.5-10.1  MG/DL


 


Corrected Calcium  9.0  8.5-10.1  MG/DL


 


Total Bilirubin  0.3  0.1-1.0  MG/DL


 


Aspartate Amino Transf


(AST/SGOT) 


 19 


 5-34  U/L





 


Alanine Aminotransferase


(ALT/SGPT) 


 13 


 0-55  U/L





 


Alkaline Phosphatase  118    U/L


 


Total Protein  7.6  6.4-8.2  GM/DL


 


Albumin  4.2  3.2-4.5  GM/DL





 (MARY LE MD)


Micro Results





Microbiology


11/18/19 Influenza Types A,B Antigen (MILY) - Final, Complete


           


 (MARY LE MD)


My Orders





Orders - MARY LE MD


Cbc With Automated Diff (11/18/19 02:14)


Comprehensive Metabolic Panel (11/18/19 02:14)


Influenza A And B Antigens (11/18/19 02:14)


Ed Iv/Invasive Line Start (11/18/19 02:14)


Ns Iv 1000 Ml (Sodium Chloride 0.9%) (11/18/19 02:14)


Albuterol/Ipra Inhalation Soln (Duoneb I (11/18/19 02:15)


Chest Pa/Lat (2 View) (11/18/19 02:14)


Svn Small Volume Nebulizer (11/18/19 02:14)


Albuterol/Ipra Inhalation Soln (Duoneb I (11/18/19 02:13)


Albuterol  Pre-Mix Nebs (Rt) (Proventil (11/18/19 02:31)


Svn Small Volume Nebulizer (11/18/19 02:31)


Oxymetazoline 0.05% Nasal Spry (Afrin 0. (11/18/19 03:53)


Rx-Albuterol Inhaler (Rx-Proair) (11/18/19 04:00)


 (MARY LE MD)


Medications Given in ED





Current Medications








 Medications  Dose


 Ordered  Sig/Dara


 Route  Start Time


 Stop Time Status Last Admin


Dose Admin


 


 Albuterol/


 Ipratropium  3 ml  ONCE  ONCE


 INH  11/18/19 02:15


 11/18/19 02:16 DC 11/18/19 02:18


3 ML


 


 Sodium Chloride  1,000 ml @ 


 0 mls/hr  Q0M ONCE


 IV  11/18/19 02:14


 11/18/19 02:16 DC 11/18/19 02:29


0 MLS/HR





 (MARY LE MD)


Vital Signs/I&O











 11/18/19 11/18/19 11/18/19





 02:05 02:19 02:38


 


Temp 35.9  


 


Pulse 107  


 


Resp 20  


 


B/P (MAP) 118/95 (103)  


 


Pulse Ox  95 100


 


O2 Delivery Room Air Room Air Room Air








 (MARY LE MD)


Vital Signs/I&O


Capillary Refill :  


 (ALISSA ALATORRE [facility])


Progress Note :  


   Time:  02:01


Progress Note


Seen and Evaluated. DDx include Bronchitis, Pneumonia, asthma exacerbation, 

common cold/viral URI, influenza. Will order CXR, CBC, CMP, Flu swab,and 

breathing treatment w/ Duo-Nebs. Will start IVF 1L NS. 0220: RT reports pt is 

doing better but does need more breathing tx; will order albuterol Neb. Monitor 

pt. 0247: pt feels better now, reports less chest pressure. feels light-headed. 

states he took 7 units of insulin 1.5 hrs ago. Will order food for pt. 


 (ALISSA ALATORRE [facility])


Progress Note :  


Progress Note


I have seen and evaluated the patient and agree with above except as indicated. 

I have directed the plan of care. Patient is well-known to me. He is here with 

report of wheezing and cough with difficulty breathing. This is been going on 

for the last week but has worsened this evening. Does have history of asthma in 

his past. He does not have inhalers. He is insulin-dependent diabetic. Current 

blood sugar in the 90s. We will go ahead and start an IV and give will saline 1 

L bolus as well as get labs and chest x-ray. Duo neb ordered and this did help 

some. Albuterol neb given afterwards and this did give him significant relief. 

Two-view chest x-ray ordered and does not show any significant findings. 0355: 

Patient states overall doing better. We will give Afrin nasal spray 3 sprays to 

each nostril now and then twice a day for 3 days only and then stop. Also will 

give albuterol MDI go pack. Discharged home with return precautions. Patient 

verbalize understanding instructions and agreement with plan.


 (MARY LE MD)





Diagnostic Imaging





   Diagonstic Imaging:  Xray


   Plain Films/CT/US/NM/MRI:  chest


Comments


No acute findings on two-view chest x-ray


   Reviewed:  Reviewed by Me


 (MARY LE MD)





Departure


Impression





   Primary Impression:  


   Viral upper respiratory infection


   Additional Impression:  


   Bronchitis, acute


   Qualified Codes:  J20.9 - Acute bronchitis, unspecified


Disposition:  01 HOME, SELF-CARE


Condition:  Improved





Departure-Patient Inst.


Decision time for Depature:  04:00


 (MARY LE MD)


Referrals:  


VERONICA MENDES MD (PCP/Family)


Primary Care Physician


Patient Instructions:  Acute Bronchitis, Adult (DC), Viral Upper Respiratory 

Infection, Adult (DC)





Add. Discharge Instructions:  


All discharge instructions reviewed with patient and/or family. Voiced 

understanding.





Use the Afrin nasal spray 2-3 sprays to each nostril twice daily for 3 days only

and then stop. Do not use more than 3 days. You may take Tylenol/acetaminophen 

1000 mg every 6-8 hours as needed for body aches or fever. Use albuterol inhaler

2 puffs every 4 hours as needed for wheezing. Follow up with your doctor this 

week for recheck and further evaluation. Return for worse pain, fever, vomiting,

weakness, breathing problems or other concerns as needed.











ALISSA ALATORRE [facility]  Nov 18, 2019 02:29


MARY DESAI MD       Nov 18, 2019 04:01


POS

## 2020-01-05 ENCOUNTER — HOSPITAL ENCOUNTER (EMERGENCY)
Dept: HOSPITAL 75 - ER | Age: 30
Discharge: LEFT BEFORE DISCHARGE | End: 2020-01-05
Payer: MEDICAID

## 2020-01-05 DIAGNOSIS — M54.2: Primary | ICD-10-CM

## 2020-01-05 DIAGNOSIS — R22.1: ICD-10-CM

## 2020-01-09 ENCOUNTER — HOSPITAL ENCOUNTER (INPATIENT)
Dept: HOSPITAL 75 - ER | Age: 30
LOS: 2 days | Discharge: HOME | DRG: 854 | End: 2020-01-11
Attending: INTERNAL MEDICINE | Admitting: FAMILY MEDICINE
Payer: MEDICAID

## 2020-01-09 VITALS — DIASTOLIC BLOOD PRESSURE: 76 MMHG | SYSTOLIC BLOOD PRESSURE: 125 MMHG

## 2020-01-09 VITALS — DIASTOLIC BLOOD PRESSURE: 83 MMHG | SYSTOLIC BLOOD PRESSURE: 121 MMHG

## 2020-01-09 VITALS — WEIGHT: 142.2 LBS | BODY MASS INDEX: 17.68 KG/M2 | HEIGHT: 75 IN

## 2020-01-09 VITALS — DIASTOLIC BLOOD PRESSURE: 70 MMHG | SYSTOLIC BLOOD PRESSURE: 114 MMHG

## 2020-01-09 VITALS — SYSTOLIC BLOOD PRESSURE: 125 MMHG | DIASTOLIC BLOOD PRESSURE: 76 MMHG

## 2020-01-09 DIAGNOSIS — E87.1: ICD-10-CM

## 2020-01-09 DIAGNOSIS — Z79.4: ICD-10-CM

## 2020-01-09 DIAGNOSIS — E10.40: ICD-10-CM

## 2020-01-09 DIAGNOSIS — B95.61: ICD-10-CM

## 2020-01-09 DIAGNOSIS — R64: ICD-10-CM

## 2020-01-09 DIAGNOSIS — Z91.19: ICD-10-CM

## 2020-01-09 DIAGNOSIS — F17.210: ICD-10-CM

## 2020-01-09 DIAGNOSIS — L03.221: ICD-10-CM

## 2020-01-09 DIAGNOSIS — L02.11: ICD-10-CM

## 2020-01-09 DIAGNOSIS — E10.65: ICD-10-CM

## 2020-01-09 DIAGNOSIS — A41.9: Primary | ICD-10-CM

## 2020-01-09 DIAGNOSIS — F41.9: ICD-10-CM

## 2020-01-09 DIAGNOSIS — F15.90: ICD-10-CM

## 2020-01-09 DIAGNOSIS — F31.9: ICD-10-CM

## 2020-01-09 DIAGNOSIS — J45.909: ICD-10-CM

## 2020-01-09 DIAGNOSIS — K21.9: ICD-10-CM

## 2020-01-09 LAB
ALBUMIN SERPL-MCNC: 3.7 GM/DL (ref 3.2–4.5)
ALP SERPL-CCNC: 181 U/L (ref 40–136)
ALT SERPL-CCNC: 11 U/L (ref 0–55)
APTT BLD: 33 SEC (ref 24–35)
APTT PPP: YELLOW S
BACTERIA #/AREA URNS HPF: NEGATIVE /HPF
BARBITURATES UR QL: NEGATIVE
BASOPHILS # BLD AUTO: 0 10^3/UL (ref 0–0.1)
BASOPHILS NFR BLD AUTO: 0 % (ref 0–10)
BENZODIAZ UR QL SCN: NEGATIVE
BILIRUB SERPL-MCNC: 0.3 MG/DL (ref 0.1–1)
BILIRUB UR QL STRIP: NEGATIVE
BUN/CREAT SERPL: 4
CALCIUM SERPL-MCNC: 9.1 MG/DL (ref 8.5–10.1)
CHLORIDE SERPL-SCNC: 96 MMOL/L (ref 98–107)
CO2 SERPL-SCNC: 26 MMOL/L (ref 21–32)
COCAINE UR QL: NEGATIVE
CREAT SERPL-MCNC: 1.01 MG/DL (ref 0.6–1.3)
EOSINOPHIL # BLD AUTO: 0.2 10^3/UL (ref 0–0.3)
EOSINOPHIL NFR BLD AUTO: 1 % (ref 0–10)
ERYTHROCYTE [DISTWIDTH] IN BLOOD BY AUTOMATED COUNT: 13 % (ref 10–14.5)
FIBRINOGEN PPP-MCNC: CLEAR MG/DL
GFR SERPLBLD BASED ON 1.73 SQ M-ARVRAT: > 60 ML/MIN
GLUCOSE SERPL-MCNC: 415 MG/DL (ref 70–105)
GLUCOSE UR STRIP-MCNC: (no result) MG/DL
HCT VFR BLD CALC: 44 % (ref 40–54)
HGB BLD-MCNC: 15.2 G/DL (ref 13.3–17.7)
INR PPP: 1 (ref 0.8–1.4)
KETONES UR QL STRIP: NEGATIVE
LEUKOCYTE ESTERASE UR QL STRIP: NEGATIVE
LYMPHOCYTES # BLD AUTO: 1.9 X 10^3 (ref 1–4)
LYMPHOCYTES NFR BLD AUTO: 12 % (ref 12–44)
MANUAL DIFFERENTIAL PERFORMED BLD QL: YES
MCH RBC QN AUTO: 29 PG (ref 25–34)
MCHC RBC AUTO-ENTMCNC: 35 G/DL (ref 32–36)
MCV RBC AUTO: 84 FL (ref 80–99)
METHADONE UR QL SCN: NEGATIVE
METHAMPHETAMINE SCREEN URINE S: POSITIVE
MONOCYTES # BLD AUTO: 1.5 X 10^3 (ref 0–1)
MONOCYTES NFR BLD AUTO: 9 % (ref 0–12)
MONOCYTES NFR BLD: 5 %
NEUTROPHILS # BLD AUTO: 12.8 X 10^3 (ref 1.8–7.8)
NEUTROPHILS NFR BLD AUTO: 78 % (ref 42–75)
NEUTS BAND NFR BLD MANUAL: 85 %
NITRITE UR QL STRIP: NEGATIVE
OPIATES UR QL SCN: NEGATIVE
OXYCODONE UR QL: NEGATIVE
PH UR STRIP: 6.5 [PH] (ref 5–9)
PLATELET # BLD: 363 10^3/UL (ref 130–400)
PMV BLD AUTO: 10.3 FL (ref 7.4–10.4)
POTASSIUM SERPL-SCNC: 3.9 MMOL/L (ref 3.6–5)
PROPOXYPH UR QL: NEGATIVE
PROT SERPL-MCNC: 7 GM/DL (ref 6.4–8.2)
PROT UR QL STRIP: NEGATIVE
PROTHROMBIN TIME: 13.1 SEC (ref 12.2–14.7)
RBC #/AREA URNS HPF: (no result) /HPF
RBC MORPH BLD: NORMAL
SODIUM SERPL-SCNC: 134 MMOL/L (ref 135–145)
SP GR UR STRIP: <=1.005 (ref 1.02–1.02)
SQUAMOUS #/AREA URNS HPF: (no result) /HPF
TRICYCLICS UR QL SCN: NEGATIVE
VARIANT LYMPHS NFR BLD MANUAL: 10 %
WBC # BLD AUTO: 16.5 10^3/UL (ref 4.3–11)
WBC #/AREA URNS HPF: (no result) /HPF

## 2020-01-09 PROCEDURE — 85610 PROTHROMBIN TIME: CPT

## 2020-01-09 PROCEDURE — 80202 ASSAY OF VANCOMYCIN: CPT

## 2020-01-09 PROCEDURE — 80048 BASIC METABOLIC PNL TOTAL CA: CPT

## 2020-01-09 PROCEDURE — 87205 SMEAR GRAM STAIN: CPT

## 2020-01-09 PROCEDURE — 85007 BL SMEAR W/DIFF WBC COUNT: CPT

## 2020-01-09 PROCEDURE — 87070 CULTURE OTHR SPECIMN AEROBIC: CPT

## 2020-01-09 PROCEDURE — 80053 COMPREHEN METABOLIC PANEL: CPT

## 2020-01-09 PROCEDURE — 70491 CT SOFT TISSUE NECK W/DYE: CPT

## 2020-01-09 PROCEDURE — 85027 COMPLETE CBC AUTOMATED: CPT

## 2020-01-09 PROCEDURE — 96367 TX/PROPH/DG ADDL SEQ IV INF: CPT

## 2020-01-09 PROCEDURE — 81000 URINALYSIS NONAUTO W/SCOPE: CPT

## 2020-01-09 PROCEDURE — 87075 CULTR BACTERIA EXCEPT BLOOD: CPT

## 2020-01-09 PROCEDURE — 85730 THROMBOPLASTIN TIME PARTIAL: CPT

## 2020-01-09 PROCEDURE — 83605 ASSAY OF LACTIC ACID: CPT

## 2020-01-09 PROCEDURE — 36415 COLL VENOUS BLD VENIPUNCTURE: CPT

## 2020-01-09 PROCEDURE — 87186 SC STD MICRODIL/AGAR DIL: CPT

## 2020-01-09 PROCEDURE — 87077 CULTURE AEROBIC IDENTIFY: CPT

## 2020-01-09 PROCEDURE — 96375 TX/PRO/DX INJ NEW DRUG ADDON: CPT

## 2020-01-09 PROCEDURE — 80306 DRUG TEST PRSMV INSTRMNT: CPT

## 2020-01-09 PROCEDURE — 82962 GLUCOSE BLOOD TEST: CPT

## 2020-01-09 PROCEDURE — 93041 RHYTHM ECG TRACING: CPT

## 2020-01-09 PROCEDURE — 96365 THER/PROPH/DIAG IV INF INIT: CPT

## 2020-01-09 PROCEDURE — 83036 HEMOGLOBIN GLYCOSYLATED A1C: CPT

## 2020-01-09 PROCEDURE — 85025 COMPLETE CBC W/AUTO DIFF WBC: CPT

## 2020-01-09 PROCEDURE — 87040 BLOOD CULTURE FOR BACTERIA: CPT

## 2020-01-09 RX ADMIN — INSULIN ASPART SCH UNIT: 100 INJECTION, SOLUTION INTRAVENOUS; SUBCUTANEOUS at 16:26

## 2020-01-09 RX ADMIN — VANCOMYCIN HYDROCHLORIDE SCH MLS/HR: 500 INJECTION, POWDER, LYOPHILIZED, FOR SOLUTION INTRAVENOUS at 21:43

## 2020-01-09 RX ADMIN — SODIUM CHLORIDE SCH MLS/HR: 900 INJECTION, SOLUTION INTRAVENOUS at 23:56

## 2020-01-09 RX ADMIN — KETOROLAC TROMETHAMINE PRN MG: 30 INJECTION, SOLUTION INTRAMUSCULAR; INTRAVENOUS at 20:20

## 2020-01-09 RX ADMIN — INSULIN ASPART SCH UNIT: 100 INJECTION, SOLUTION INTRAVENOUS; SUBCUTANEOUS at 21:05

## 2020-01-09 RX ADMIN — SODIUM CHLORIDE SCH MLS/HR: 900 INJECTION, SOLUTION INTRAVENOUS at 18:06

## 2020-01-09 RX ADMIN — ACETAMINOPHEN PRN MG: 500 TABLET ORAL at 18:06

## 2020-01-09 RX ADMIN — SODIUM CHLORIDE SCH MLS/HR: 900 INJECTION, SOLUTION INTRAVENOUS at 20:15

## 2020-01-09 NOTE — NUR
Patient ambulatory to restroom to provide sample for UA and UDS with nurse assistance. THIS RN WITNESSED PT PROGRAM PERSONAL INSULIN PUMP TO ADMINISTER 5UNITS OF INSULIN. THIS RN 
INSTRUCTED PT TO INFORM SOMEONE IF HE ADMINISTERED ANYMORE INSULIN.

## 2020-01-09 NOTE — ED INTEGUMENTARY GENERAL
General


Chief Complaint:  Skin/Wound Problems


Stated Complaint:  NECK STIFFNESS


Nursing Triage Note:  


PT HAS LARGE BUMP, APPEARS TO BE ABCESS TO R-SIDE BACK OF HEAD. PT. STATES HE'S 


HAD IT FOR @ A WEEK. STATES IT IS HARD FOR HIM TO TURN HEAD AND SITE DOES HURT. 


PT. DENIES FEVER OR ILLNESS DURING THIS TIME.


Source:  patient, old records





History of Present Illness


Date Seen by Provider:  Jan 9, 2020


Time Seen by Provider:  10:44


Initial Comments


PT ARRIVES VIA POV 


PT STATES THAT HE THINKS HE HAS AN ABSCESS ON THE RIGHT POSTERIOR SCALP--STATES 

IT HAS BEEN PRESENT FOR AT LEAST A WEEK


STATES THE AREA IS PAINFUL, AND IT IS NOW CAUSING HIM PAIN TO TURN HIS HEAD TO 

THE RIGHT


NO FEVER


NO KNOWN INJURY





HAS NOT TAKEN ANYTHING FOR SYMPTOMS 


SYMPTOMS NO DIFFERENT TODAY


HAS NOT SEEN  BEFORE TODAY FOR THIS, AND HAS NOT ATTEMPTED TO MAKE AN 

APPOINTMENT WITH HIS PCP / KLAUDIA T ANY TIME FOR THIS. 


DID COME TO ER 01/05/20 FOR THIS COMPLAINT, BUT LEFT WITHOUT BEING SEEN FROM THE

WAITING ROOM. 





HAS HISTORY OF ABSCESSES IN OTHER AREAS OF BODY, WITH MULTIPLE ER VISITS IN 

OCTOBER AND NOVEMBER FOR RIGHT FLANK ABSCESS





PT WITH A MULTITUDE OF VISITS FOR VARIOUS COMPLAINTS


PT IS TYPE 1 DIABETIC, CURRENTLY HAS AN INSULIN PUMP.. PT DOES NOT CHECK HIS 

BLOOD GLUCOSE


EXTREME NON-COMPLIANCE IN ALL ASPECTS OF CARE. 


PT FREQUENTLY LEAVES AMA


PT ALWAYS DEMANDS PAIN MEDICATIONS AS SOON AS HE ARRIVES, AND DOES SO TODAY





PCP: KLAUDIA, DR. MENDES





Allergies and Home Medications


Allergies


Coded Allergies:  


     fluoxetine (Verified  Allergy, Unknown, 1/9/20)


     trazodone (Verified  Allergy, Unknown, 1/9/20)





Home Medications


Insulin Lispro 100 Unit/1 Ml Vial, SC UD, (Reported)


   80 UNITS PER DAY PER INSULIN PUMP 





Patient Home Medication List


Home Medication List Reviewed:  Yes





Review of Systems


Review of Systems


Constitutional:  No fever


Respiratory:  no symptoms reported


Cardiovascular:  no symptoms reported


Gastrointestinal:  no symptoms reported


Skin:  see HPI


Psychiatric/Neurological:  No Symptoms Reported


Endocrine:  See HPI





Past Medical-Social-Family Hx


Past Med/Social Hx:  Reviewed and Corrections made


Patient Social History


Alcohol Use:  Denies Use


Recreational Drug Use:  Yes (THC, SMOKES METH--DENIES IV USE)


Drug of Choice:  THC, SMOKES METH-DENIES IV USE


Smoking Status:  Current Everyday Smoker (1 PPD)


Type Used:  Cigarettes


2nd Hand Smoke Exposure:  Yes


Recent Foreign Travel:  No


Contact w/Someone Who Travel:  No


Recent Infectious Disease Expo:  No


Recent Hopitalizations:  No





Immunizations Up To Date


Tetanus Booster (TDap):  Unknown


PED Vaccines UTD:  Yes





Seasonal Allergies


Seasonal Allergies:  No





Past Medical History


Surgeries:  Yes (EGD; LEFT  HIP FX/ORIF 2017)


Orthopedic, Tonsillectomy


Respiratory:  Yes


Asthma


Currently Using CPAP:  No


Currently Using BIPAP:  No


Cardiac:  No


Neurological:  Yes


Neuropathy


Reproductive Disorders:  No


Sexually Transmitted Disease:  No


HIV/AIDS:  No


Genitourinary:  Yes


Prostate Problems


Gastrointestinal:  Yes


Gastroesophageal Reflux, Liver Disease/Jaundice, Chronic Constipation


Musculoskeletal:  Yes (LEFT HIP FX/ORIF 2017)


Fractures


Endocrine:  Yes (TYPE 1 DIABETES, WITH INSULIN PUMP AS OF 01/09/20-EXTREME NON-

COMPLIANCE)


Diabetes, Insulin dep


HEENT:  Yes (DENTAL CARIES)


Cancer:  No


Psychosocial:  Yes (POLYSUBSTANCE)


Anxiety, Bipolar, Depression


Integumentary:  Yes


Eczema


Blood Disorders:  No


Adverse Reaction/Blood Tranf:  No





Family Medical History





Diabetes mellitus


  19 FATHER


  UNCLE


FH: COPD (chronic obstructive pulmonary disease)


  19 MOTHER


FH: brain cancer


  AUNT


FH: diverticulitis


  19 FATHER


Thyroid disease


  19 MOTHER


No Pertinent Family Hx





Physical Exam


Vital Signs





Vital Signs - First Documented








 1/9/20





 09:39


 


Temp 36.5


 


Pulse 134


 


Resp 20


 


B/P (MAP) 123/88 (100)


 


Pulse Ox 99


 


O2 Delivery Room Air





Capillary Refill : Less Than 3 Seconds


General Appearance:  cachetic, other (FILTHY, EXTREMELY MALODOROUS. DOES NOT 

APPEAR TO BE IN ANY DISCOMFORT OR DISTRESS. BELLIGERENT AND CURSING DURING MOST 

OF ER STAY)


HEENT:  PERRL/EOMI, other (APPROX 10 CM DIAMETER, TENDER, ERYTHEMATOUS, SLIGHTLY

FIRM MASS TO RIGHT OCCIPITAL SCALP/UPPER NECK/POST AURICULAR AREA. NO DRAINAGE. 

EXTENSIVE DENTAL CARIES)


Cardiovascular:  regular rate, rhythm, no murmur


Respiratory:  normal breath sounds


Extremities:  normal inspection


Neurologic/Psychiatric:  CNs II-XII nml as tested, no motor/sensory deficits, 

alert, oriented x 3


Skin:  normal color, warm/dry


Skin Problem Location:  scalp


Skin Problem Character:  abscess





Progress/Results/Core Measures


Results/Orders


Lab Results





Laboratory Tests








Test


 1/9/20


11:41 1/9/20


11:42 1/9/20


12:00 Range/Units


 


 


Glucometer 381 H     MG/DL


 


Lactic Acid Level


 


 1.78 


 


 0.50-2.00


MMOL/L


 


White Blood Count


 


 


 16.5 H


 4.3-11.0


10^3/uL


 


Red Blood Count


 


 


 5.17 


 4.35-5.85


10^6/uL


 


Hemoglobin   15.2  13.3-17.7  G/DL


 


Hematocrit   44  40-54  %


 


Mean Corpuscular Volume   84  80-99  FL


 


Mean Corpuscular Hemoglobin   29  25-34  PG


 


Mean Corpuscular Hemoglobin


Concent 


 


 35 


 32-36  G/DL





 


Red Cell Distribution Width   13.0  10.0-14.5  %


 


Platelet Count


 


 


 363 


 130-400


10^3/uL


 


Mean Platelet Volume   10.3  7.4-10.4  FL


 


Neutrophils (%) (Auto)   78 H 42-75  %


 


Lymphocytes (%) (Auto)   12  12-44  %


 


Monocytes (%) (Auto)   9  0-12  %


 


Eosinophils (%) (Auto)   1  0-10  %


 


Basophils (%) (Auto)   0  0-10  %


 


Neutrophils # (Auto)   12.8 H 1.8-7.8  X 10^3


 


Lymphocytes # (Auto)   1.9  1.0-4.0  X 10^3


 


Monocytes # (Auto)   1.5 H 0.0-1.0  X 10^3


 


Eosinophils # (Auto)


 


 


 0.2 


 0.0-0.3


10^3/uL


 


Basophils # (Auto)


 


 


 0.0 


 0.0-0.1


10^3/uL


 


Neutrophils % (Manual)   85   %


 


Lymphocytes % (Manual)   10   %


 


Monocytes % (Manual)   5   %


 


Blood Morphology Comment   NORMAL   


 


Prothrombin Time   13.1  12.2-14.7  SEC


 


INR Comment   1.0  0.8-1.4  


 


Activated Partial


Thromboplast Time 


 


 33 


 24-35  SEC





 


Urine Color   YELLOW   


 


Urine Clarity   CLEAR   


 


Urine pH   6.5  5-9  


 


Urine Specific Gravity   <=1.005  1.016-1.022  


 


Urine Protein   NEGATIVE  NEGATIVE  


 


Urine Glucose (UA)   3+ H NEGATIVE  


 


Urine Ketones   NEGATIVE  NEGATIVE  


 


Urine Nitrite   NEGATIVE  NEGATIVE  


 


Urine Bilirubin   NEGATIVE  NEGATIVE  


 


Urine Urobilinogen   0.2  < = 1.0  MG/DL


 


Urine Leukocyte Esterase   NEGATIVE  NEGATIVE  


 


Urine RBC (Auto)   TRACE-L  NEGATIVE  


 


Urine RBC   RARE   /HPF


 


Urine WBC   NONE   /HPF


 


Urine Squamous Epithelial


Cells 


 


 0-2 


  /HPF





 


Urine Crystals   NONE   /LPF


 


Urine Bacteria   NEGATIVE   /HPF


 


Urine Casts   NONE   /LPF


 


Urine Mucus   NEGATIVE   /LPF


 


Urine Culture Indicated   NO   


 


Sodium Level   134 L 135-145  MMOL/L


 


Potassium Level   3.9  3.6-5.0  MMOL/L


 


Chloride Level   96 L   MMOL/L


 


Carbon Dioxide Level   26  21-32  MMOL/L


 


Anion Gap   12  5-14  MMOL/L


 


Blood Urea Nitrogen   4 L 7-18  MG/DL


 


Creatinine


 


 


 1.01 


 0.60-1.30


MG/DL


 


Estimat Glomerular Filtration


Rate 


 


 > 60 


  





 


BUN/Creatinine Ratio   4   


 


Glucose Level   415 *H   MG/DL


 


Calcium Level   9.1  8.5-10.1  MG/DL


 


Corrected Calcium   9.3  8.5-10.1  MG/DL


 


Total Bilirubin   0.3  0.1-1.0  MG/DL


 


Aspartate Amino Transf


(AST/SGOT) 


 


 7 


 5-34  U/L





 


Alanine Aminotransferase


(ALT/SGPT) 


 


 11 


 0-55  U/L





 


Alkaline Phosphatase   181 H   U/L


 


Total Protein   7.0  6.4-8.2  GM/DL


 


Albumin   3.7  3.2-4.5  GM/DL


 


Urine Opiates Screen   NEGATIVE  NEGATIVE  


 


Urine Oxycodone Screen   NEGATIVE  NEGATIVE  


 


Urine Methadone Screen   NEGATIVE  NEGATIVE  


 


Urine Propoxyphene Screen   NEGATIVE  NEGATIVE  


 


Urine Barbiturates Screen   NEGATIVE  NEGATIVE  


 


Ur Tricyclic Antidepressants


Screen 


 


 NEGATIVE 


 NEGATIVE  





 


Urine Phencyclidine Screen   NEGATIVE  NEGATIVE  


 


Urine Amphetamines Screen   POSITIVE H NEGATIVE  


 


Urine Methamphetamines Screen   POSITIVE H NEGATIVE  


 


Urine Benzodiazepines Screen   NEGATIVE  NEGATIVE  


 


Urine Cocaine Screen   NEGATIVE  NEGATIVE  


 


Urine Cannabinoids Screen   NEGATIVE  NEGATIVE  








My Orders





Orders - VIOLETTA,JAIME K DO


Accucheck Stat ONCE (1/9/20 10:47)


Monitor-Rhythm Ecg Trace Only (1/9/20 10:47)


Cbc With Automated Diff (1/9/20 10:47)


Comprehensive Metabolic Panel (1/9/20 10:47)


Drug Screen Stat (Urine) (1/9/20 10:47)


Lactic Acid Analyzer (1/9/20 10:47)


Protime With Inr (1/9/20 10:47)


Partial Thromboplastin Time (1/9/20 10:47)


Blood Culture (1/9/20 10:47)


Ct Neck (Soft Tissue) W (1/9/20 10:47)


Iohexol Injection (Omnipaque 350 Mg/Ml 1 (1/9/20 11:00)


Received Contrast (Hold Metformin- Contr (1/9/20 11:00)


Sodium Chloride Flush (Catheter Flush Sy (1/9/20 11:00)


Ns (Ivpb) (Sodium Chloride 0.9% Ivpb Bag (1/9/20 11:00)


Arterial Blood Gas (1/9/20 12:08)


Manual Differential (1/9/20 12:00)


Piperacillin Sodium/Tazobactam (Zosyn Vi (1/9/20 12:30)


Ketorolac Injection (Toradol Injection) (1/9/20 12:30)


Vancomycin Injection (Vancomycin Injecti (1/9/20 12:30)





Medications Given in ED





Current Medications








 Medications  Dose


 Ordered  Sig/Dara


 Route  Start Time


 Stop Time Status Last Admin


Dose Admin


 


 Iohexol  75 ml  ONCE  ONCE


 IV  1/9/20 11:00


 1/9/20 11:01 DC 1/9/20 11:22


75 ML


 


 Ketorolac


 Tromethamine  30 mg  ONCE  ONCE


 IVP  1/9/20 12:30


 1/9/20 12:31 DC 1/9/20 12:43


30 MG


 


 Piperacillin Sod/


 Tazobactam Sod


 4.5 gm/Sodium


 Chloride  100 ml @ 


 200 mls/hr  ONCE  ONCE


 IV  1/9/20 12:30


 1/9/20 12:59 DC 1/9/20 12:57


200 MLS/HR


 


 Sodium Chloride  10 ml  AS NEEDED  PRN


 IV  1/9/20 11:00


    1/9/20 11:22


10 ML


 


 Sodium Chloride  100 ml  ONCE  ONCE


 IV  1/9/20 11:00


 1/9/20 11:01 DC 1/9/20 11:22


80 ML








Vital Signs/I&O











 1/9/20





 09:39


 


Temp 36.5


 


Pulse 134


 


Resp 20


 


B/P (MAP) 123/88 (100)


 


Pulse Ox 99


 


O2 Delivery Room Air














Blood Pressure Mean:                    100











Progress


Progress Note :  


Progress Note


NO DETERIORATION IN PT'S CONDITION DURING ER STAY





PT CONTINUED TO BE BELLIGERENT AND CURSING THROUGHOUT ER STAY


PT REFUSES ABG'S. 


DEMANDING PAIN MEDICATION, YET HAS NOT TAKEN ANYTHING FOR PAIN AT ANY TIME AT 

HOME.





Diagnostic Imaging





Comments


CT NECK SOFT TISSUES--LARGE COMPLEX FLUID COLLECTION--C/W LARGE ABSCESS TO RIGHT

POSTERIOR NECK--PER RADIOLOGIST REPORT AT 1155


   Reviewed:  Reviewed by Me





Departure


Communication (Admissions)


1245--SPOKE WITH DR. GASPAR, ON CALL FOR [facility], ACCEPTS PT FOR ADMIT. WILL 

CONSULT SURGERY.





Impression





   Primary Impression:  


   HEAD/NECK ABSCESS


   Additional Impressions:  


   Sepsis


   Illicit drug use


   Methamphetamine use


   Non-compliance


   Hyponatremia


   Uncontrolled type 1 diabetes mellitus


Disposition:  09 ADMITTED AS INPATIENT


Condition:  Stable





Admissions


Decision to Admit Reason:  Admit from ER (General)


Decision to Admit/Date:  Jan 9, 2020


Time/Decision to Admit Time:  12:45





Departure-Patient Inst.


Referrals:  


VERONICA MENDES MD (PCP/Family)


Primary Care Physician











JAIME SHIPLEY DO                  Jan 9, 2020 11:44

## 2020-01-09 NOTE — NUR
Pt refused second set of cultures to be drawn by . Pt cursing at lab 
tech and this RN. Provider notified.

## 2020-01-09 NOTE — NUR
BRIDGETT MICHAUD admitted to room 415-1, with an admitting diagnosis of sepsis and scalp and 
neck abscess, on 01/09/20 from ER via w/chuy, accompanied by WANDY Marsh. BRIDGETT MICHAUD 
introduced to surroundings, call light, bed controls, phone, TV, temperature control, 
lights, meal times, smoking policy, visitor policy, side rail policy, bathrooms and showers. 
 Patient Rights given to patient in the handbook. BRIDGETT MICHAUD verbalizes understanding 
that [facility] is not responsible for the loss or damage to any personal effects or 
valuables that are kept in the patients posession during their hospitalization.  
BRIDGETT MICHAUD verbalizes understanding of Interdisciplinary Patient Education. Patient 
and/or family were informed about the Rapid Response Team and its purpose.

## 2020-01-09 NOTE — CONSULTATION - SURGERY
SHANA NUNEZ [facility] 20 1725:


History of Present Illness


History of Present Illness


Patient Consulted On(gracie/time)


20


 17:19


Date Seen by Provider:  2020


Time Seen by Provider:  16:55


History of Present Illness


Pt reports that over the last week he has had a painful swollen mass on the back

of the right side of his head develop. He states it has been so painful this 

morning that he was unable to to turn his head. He states he has had abscesses 

on his back and shoulders in the past.





Allergies and Home Medications


Allergies


Coded Allergies:  


     fluoxetine (Verified  Allergy, Unknown, 20)


     trazodone (Verified  Allergy, Unknown, 20)





Home Medications


Insulin Lispro 100 Unit/1 Ml Vial, SC UD, (Reported)


   80 UNITS PER DAY PER INSULIN PUMP 





Past Medical-Social-Family Hx


Patient Social History


Alcohol Use:  Denies Use


Recreational Drug Use:  No


Drug of Choice:  MARIJUANA-EVERY ONCE IN AWHILE, past hx meth, relapse 2 wks ago

(20)


Smoking Status:  Current Everyday Smoker


Former Smoker, Quit:  2019


Type Used:  Cigarettes


2nd Hand Smoke Exposure:  Yes


Recent Foreign Travel:  No


Contact w/Someone Who Travel:  No


Recent Infectious Disease Expo:  No


Recent Hopitalizations:  No





Immunizations Up To Date


Tetanus Booster (TDap):  Unknown


PED Vaccines UTD:  Yes





Seasonal Allergies


Seasonal Allergies:  No





Surgeries


History of Surgeries:  Yes (EGD, LT HIP FX)


Surgeries:  Orthopedic, Tonsillectomy





Respiratory


History of Respiratory Disorde:  Yes


Respiratory Disorders:  Asthma





Cardiovascular


History of Cardiac Disorders:  No





Neurological


History of Neurological Disord:  Yes


Neurological Disorders:  Neuropathy





Reproductive System


Hx Reproductive Disorders:  No


Sexually Transmitted Disease:  No


HIV/AIDS:  No





Genitourinary


History of Genitourinary Disor:  Yes


Genitourinary Disorders:  Prostate Problems





Gastrointestinal


History of Gastrointestinal Di:  Yes (current constipation)


Gastrointestinal Disorders:  Gastroesophageal Reflux, Liver Disease/Jaundice





Musculoskeletal


History of Musculoskeletal Dis:  No





Endocrine


History of Endocrine Disorders:  Yes (Type I)


Endocrine Disorders:  Diabetes, Insulin dep





HEENT


History of HEENT Disorders:  No





Cancer


History of Cancer:  No





Psychosocial


History of Psychiatric Problem:  Yes


Behavioral Health Disorders:  Anxiety, Bipolar, Depression





Integumentary


History of Skin or Integumenta:  Yes


Skin/Integumentary Disorders:  Eczema





Blood Transfusions


History of Blood Disorders:  No


Adverse Reaction to a Blood Tr:  No





Family Medical History


Family Medial History:  


Diabetes mellitus


  19 FATHER


  UNCLE


FH: COPD (chronic obstructive pulmonary disease)


  19 MOTHER


FH: brain cancer


  AUNT


FH: diverticulitis


  19 FATHER


Thyroid disease


  19 MOTHER





Review of Systems-General


Constitutional:  No chills; fever


Skin:  see HPI





Physical Exam-General Problems


Physical Exam


Vital Signs





Vital Signs - First Documented








 20





 09:39


 


Temp 36.5


 


Pulse 134


 


Resp 20


 


B/P (MAP) 123/88 (100)


 


Pulse Ox 99


 


O2 Delivery Room Air





Capillary Refill : Less Than 3 Seconds


General Appearance:  WD/WN, no apparent distress


HEENT:  PERRL/EOMI; No scleral icterus (R), No scleral icterus (L); other 

(Dental Caries)


Neck:  supple, limited range of motion (Due to pain from neck abscess)


Respiratory:  no respiratory distress, no accessory muscle use


Cardiovascular:  normal peripheral pulses


Peripheral Pulses:  2+ Radial Pulses (R), 2+ Radial Pulses (L)


Gastrointestinal:  soft


Extremities:  no pedal edema, no calf tenderness


Neurologic/Psychiatric:  alert, oriented x 3


Skin:  normal color, warm/dry, other (Erythema, tenderness over posterior right 

neck abscess)





Data Review


Labs


Laboratory Tests


20 11:41: Glucometer 381H


20 11:42: Lactic Acid Level 1.78


20 12:00: 


White Blood Count 16.5H, Red Blood Count 5.17, Hemoglobin 15.2, Hematocrit 44, 

Mean Corpuscular Volume 84, Mean Corpuscular Hemoglobin 29, Mean Corpuscular 

Hemoglobin Concent 35, Red Cell Distribution Width 13.0, Platelet Count 363, 

Mean Platelet Volume 10.3, Neutrophils (%) (Auto) 78H, Lymphocytes (%) (Auto) 

12, Monocytes (%) (Auto) 9, Eosinophils (%) (Auto) 1, Basophils (%) (Auto) 0, 

Neutrophils # (Auto) 12.8H, Lymphocytes # (Auto) 1.9, Monocytes # (Auto) 1.5H, 

Eosinophils # (Auto) 0.2, Basophils # (Auto) 0.0, Neutrophils % (Manual) 85, Ly

mphocytes % (Manual) 10, Monocytes % (Manual) 5, Blood Morphology Comment 

NORMAL, Prothrombin Time 13.1, INR Comment 1.0, Activated Partial Thromboplast 

Time 33, Urine Color YELLOW, Urine Clarity CLEAR, Urine pH 6.5, Urine Specific 

Gravity <=1.005, Urine Protein NEGATIVE, Urine Glucose (UA) 3+H, Urine Ketones 

NEGATIVE, Urine Nitrite NEGATIVE, Urine Bilirubin NEGATIVE, Urine Urobilinogen 

0.2, Urine Leukocyte Esterase NEGATIVE, Urine RBC (Auto) TRACE-L, Urine RBC 

RARE, Urine WBC NONE, Urine Squamous Epithelial Cells 0-2, Urine Crystals NONE, 

Urine Bacteria NEGATIVE, Urine Casts NONE, Urine Mucus NEGATIVE, Urine Culture 

Indicated NO, Sodium Level 134L, Potassium Level 3.9, Chloride Level 96L, Carbon

Dioxide Level 26, Anion Gap 12, Blood Urea Nitrogen 4L, Creatinine 1.01, Estimat

Glomerular Filtration Rate > 60, BUN/Creatinine Ratio 4, Glucose Level 415*H, 

Calcium Level 9.1, Corrected Calcium 9.3, Total Bilirubin 0.3, Aspartate Amino 

Transf (AST/SGOT) 7, Alanine Aminotransferase (ALT/SGPT) 11, Alkaline 

Phosphatase 181H, Total Protein 7.0, Albumin 3.7, Urine Opiates Screen NEGATIVE,

Urine Oxycodone Screen NEGATIVE, Urine Methadone Screen NEGATIVE, Urine 

Propoxyphene Screen NEGATIVE, Urine Barbiturates Screen NEGATIVE, Ur Tricyclic 

Antidepressants Screen NEGATIVE, Urine Phencyclidine Screen NEGATIVE, Urine 

Amphetamines Screen POSITIVEH, Urine Methamphetamines Screen POSITIVEH, Urine 

Benzodiazepines Screen NEGATIVE, Urine Cocaine Screen NEGATIVE, Urine 

Cannabinoids Screen NEGATIVE


20 15:48: Glucometer 307H





Assessment/Plan


Diabetes 


Methamphetamine use


Incision drainage performed at bedside with culture of posterior right neck 

abscess





Clinical Quality Measures


DVT/VTE Risk/Contraindication:


Risk Factor Score Per Nursin


RFS Level Per Nursing on Admit:  1=Low/No VTE PPX





NGOC CHANG DO 20 2018:


History of Present Illness


Consult requested by Dr. Wynne for neck abscess.  Patient is a 29 year old male 

who has pain and swelling to right back of neck.  Moderate pain.  Worse with 

moving or touching.  Nothing has made it better.  He has had this for about a 

week.  Not done anything for it.  He states he has not had a fever.  He states 

he is diabetic.  He had a ct scan of neck demonstrating complex fluid collection

consistent with abscess.  Denies sweats chills shortness of breath or chest 

pain.





Allergies and Home Medications


Allergies


Coded Allergies:  


     fluoxetine (Verified  Allergy, Unknown, 20)


     trazodone (Verified  Allergy, Unknown, 20)





Home Medications


Insulin Lispro 100 Unit/1 Ml Vial, SC UD, (Reported)


   80 UNITS PER DAY PER INSULIN PUMP 





Patient Home Medication List


Home Medication List Reviewed:  Yes





Past Medical-Social-Family Hx


Surgeries


History of Surgeries:  Yes (abscess drainage)


Surgeries:  Orthopedic





Respiratory


History of Respiratory Disorde:  No





Cardiovascular


History of Cardiac Disorders:  No





Neurological


History of Neurological Disord:  No





Reviewed Nursing Assessment


Reviewed/Agree w Nursing PMH:  Yes





Family Medical History


Significant Family History:  No Pertinent Family Hx


Family Medial History:  


Diabetes mellitus


  19 FATHER


  UNCLE


FH: COPD (chronic obstructive pulmonary disease)


  19 MOTHER


FH: brain cancer


  AUNT


FH: diverticulitis


  19 FATHER


Thyroid disease


  19 MOTHER





Review of Systems-General


Constitutional:  No chills, No fever


EENTM:  No hearing loss, No ear pain, No blurred vision


Respiratory:  no symptoms reported; No cough, No dyspnea on exertion


Cardiovascular:  no symptoms reported; No chest pain, No edema


Gastrointestinal:  no symptoms reported; No abdominal pain, No dysphagia


Genitourinary:  no symptoms reported; No decreased output, No discharge


Musculoskeletal:  no symptoms reported, neck pain


Skin:  change in color (erythema neck)


Psychiatric/Neurological:  Denies Anxiety, Denies Depressed





Physical Exam-General Problems


Physical Exam


General Appearance:  WD/WN, no apparent distress


HEENT:  PERRL/EOMI, other (Dental Caries)


Neck:  supple, limited range of motion (Due to pain from neck abscess)


Respiratory:  chest non-tender, no respiratory distress, no accessory muscle use


Cardiovascular:  normal peripheral pulses, regular rate, rhythm


Gastrointestinal:  non tender, soft


Rectal:  deferred


Back:  no CVA tenderness


Extremities:  no pedal edema, no calf tenderness


Neurologic/Psychiatric:  alert, normal mood/affect, oriented x 3


Skin:  other (Erythema, tenderness over posterior right neck abscess with 

fluctuance)


Lymphatic:  no adenopathy





Assessment/Plan


right posterior neck abscess


diabetes


methamphetamine use


leukocytosis





patient on vanc/zosyn


patient discussed risks and benefits of incision and drainage of right posterior

neck abscess and he understands and wishes to proceed.


patient understands need for dressing changes and wound care.


cultures to be obtained


will follow





Supervisory-Addendum Brief


Verification & Attestation


Participated in pt care:  history, MDM, physical


Personally performed:  exam, history, MDM, supervision of care


Care discussed with:  Medical Student


Procedures:  n/a


Results interpretation:  Verified all documentation


Verification and Attestation of Medical Student E/M Service





A medical student performed and documented this service in my presence. I 

reviewed and verified all information documented by the medical student and made

modifications to such information, when appropriate. I personally performed the 

physical exam and medical decision making. 





 Ngoc Chang, 2020,20:22











SHANA NUNEZ [facility]        2020 17:25


NGOC CHANG DO               2020 20:18

## 2020-01-09 NOTE — NUR
PT STATED HE WANTS TO USE INSULIN FROM INSULIN PUMP. THIS RN CONTACTED DR. GASPAR TO ASK IF 
IT IS ACCEPTABLE FOR PT TO USE HIS INSULIN PUMP. DR. GASPAR CONFIRMED IT IS OKAY FOR PT TO 
USE OWN INSULIN PUMP.

## 2020-01-09 NOTE — NUR
VANCOMYCIN DOSING:  SCr 1.01, CrCl 127.8, WEIGHT 64.5MG

LOADING DOSE 1250MG 1-9 @ 1334 (20MG/KG X 64.5KG ~ 1250MG)

MAINTENANCE DOSE 15MG/KG X 64.5KG ~ 1000MG Q8H

VANCOMYCIN TROUGH DUE 1/10 @ 1230; IF TROUGH >20, HOLD 1/10 1330 DOSE

## 2020-01-09 NOTE — HISTORY & PHYSICAL
HPI


History of Present Illness:


Abscess on back of head/neck, started about a week ago. At first he thought he 

had just popped his neck moving it, but then started to have swelling there the 

next day. Got extremely large over the last week, was going to come in 2-3 days 

after it started but ER was very busy. Denies fever, nausea, vomiting. He has ty

pe I DM and uses a pump. He reports he checks blood sugar 3-4 times per day at 

home, lowest it has been recently is 230, but he states for him that is kind of 

good.


Source:  patient


Date seen by provider:  2020


Time Seen by Provider:  17:14


Attending Physician


Farideh Wynne MD


PCP


Cb Mendes MD


Consult





Date of Admission


2020 at 12:45





Home Medications


Home Medications


Reviewed patient Home Medication Reconciliation performed by pharmacy medication

reconciliations technician and/or nursing.


Patients Allergies have been reviewed.





Allergies


Coded Allergies:  


     fluoxetine (Verified  Allergy, Unknown, 20)


     trazodone (Verified  Allergy, Unknown, 20)





PMH-Social-Family Hx


Patient Social History


Alcohol Use:  Denies Use


Recreational Drug Use:  No


Drug of Choice:  MARIJUANA-EVERY ONCE IN AWHILE, past hx meth, relapse 2 wks ago

(20)


Smoking Status:  Current Everyday Smoker


Type Used:  Cigarettes


2nd Hand Smoke Exposure:  Yes


Recent Foreign Travel:  No


Contact w/other who traveled:  No


Recent Hopitalizations:  No


Recent Infectious Disease Expo:  No





Immunizations Up To Date


Tetanus Booster (TDap):  Unknown





Past Medical History





PMHx:


DMI


Asthma


Anxiety


Depression





SurgHx:


Left hip reconstruction





Family Medical History


Family History:  


Diabetes mellitus


  19 FATHER


  UNCLE


FH: COPD (chronic obstructive pulmonary disease)


  19 MOTHER


FH: brain cancer


  AUNT


FH: diverticulitis


  19 FATHER


Thyroid disease


  19 MOTHER





Review of Systems ([facility])


Constitutional:  No fever


EENTM:  No nose congestion, No throat pain


Respiratory:  cough (occasional)


Cardiovascular:  No chest pain


Gastrointestinal:  abdominal pain (occasional), constipation, diarrhea; No 

nausea, No vomiting


Genitourinary:  No dysuria


Musculoskeletal:  joint pain (hip and back at times)


Skin:  see HPI


Psychiatric/Neurological:  Anxiety, Depressed





Reviewed Test Results


Reviewed Test Results


Lab





Laboratory Tests








Test


 20


11:41 20


11:42 20


12:00 20


15:48 Range/Units


 


 


Glucometer 381 H   307 H   MG/DL


 


Lactic Acid Level


 


 1.78 


 


 


 0.50-2.00


MMOL/L


 


White Blood Count


 


 


 16.5 H


 


 4.3-11.0


10^3/uL


 


Red Blood Count


 


 


 5.17 


 


 4.35-5.85


10^6/uL


 


Hemoglobin   15.2   13.3-17.7  G/DL


 


Hematocrit   44   40-54  %


 


Mean Corpuscular Volume   84   80-99  FL


 


Mean Corpuscular Hemoglobin   29   25-34  PG


 


Mean Corpuscular Hemoglobin


Concent 


 


 35 


 


 32-36  G/DL





 


Red Cell Distribution Width   13.0   10.0-14.5  %


 


Platelet Count


 


 


 363 


 


 130-400


10^3/uL


 


Mean Platelet Volume   10.3   7.4-10.4  FL


 


Neutrophils (%) (Auto)   78 H  42-75  %


 


Lymphocytes (%) (Auto)   12   12-44  %


 


Monocytes (%) (Auto)   9   0-12  %


 


Eosinophils (%) (Auto)   1   0-10  %


 


Basophils (%) (Auto)   0   0-10  %


 


Neutrophils # (Auto)   12.8 H  1.8-7.8  X 10^3


 


Lymphocytes # (Auto)   1.9   1.0-4.0  X 10^3


 


Monocytes # (Auto)   1.5 H  0.0-1.0  X 10^3


 


Eosinophils # (Auto)


 


 


 0.2 


 


 0.0-0.3


10^3/uL


 


Basophils # (Auto)


 


 


 0.0 


 


 0.0-0.1


10^3/uL


 


Neutrophils % (Manual)   85    %


 


Lymphocytes % (Manual)   10    %


 


Monocytes % (Manual)   5    %


 


Blood Morphology Comment   NORMAL    


 


Prothrombin Time   13.1   12.2-14.7  SEC


 


INR Comment   1.0   0.8-1.4  


 


Activated Partial


Thromboplast Time 


 


 33 


 


 24-35  SEC





 


Urine Color   YELLOW    


 


Urine Clarity   CLEAR    


 


Urine pH   6.5   5-9  


 


Urine Specific Gravity   <=1.005   1.016-1.022  


 


Urine Protein   NEGATIVE   NEGATIVE  


 


Urine Glucose (UA)   3+ H  NEGATIVE  


 


Urine Ketones   NEGATIVE   NEGATIVE  


 


Urine Nitrite   NEGATIVE   NEGATIVE  


 


Urine Bilirubin   NEGATIVE   NEGATIVE  


 


Urine Urobilinogen   0.2   < = 1.0  MG/DL


 


Urine Leukocyte Esterase   NEGATIVE   NEGATIVE  


 


Urine RBC (Auto)   TRACE-L   NEGATIVE  


 


Urine RBC   RARE    /HPF


 


Urine WBC   NONE    /HPF


 


Urine Squamous Epithelial


Cells 


 


 0-2 


 


  /HPF





 


Urine Crystals   NONE    /LPF


 


Urine Bacteria   NEGATIVE    /HPF


 


Urine Casts   NONE    /LPF


 


Urine Mucus   NEGATIVE    /LPF


 


Urine Culture Indicated   NO    


 


Sodium Level   134 L  135-145  MMOL/L


 


Potassium Level   3.9   3.6-5.0  MMOL/L


 


Chloride Level   96 L    MMOL/L


 


Carbon Dioxide Level   26   21-32  MMOL/L


 


Anion Gap   12   5-14  MMOL/L


 


Blood Urea Nitrogen   4 L  7-18  MG/DL


 


Creatinine


 


 


 1.01 


 


 0.60-1.30


MG/DL


 


Estimat Glomerular Filtration


Rate 


 


 > 60 


 


  





 


BUN/Creatinine Ratio   4    


 


Glucose Level   415 *H    MG/DL


 


Calcium Level   9.1   8.5-10.1  MG/DL


 


Corrected Calcium   9.3   8.5-10.1  MG/DL


 


Total Bilirubin   0.3   0.1-1.0  MG/DL


 


Aspartate Amino Transf


(AST/SGOT) 


 


 7 


 


 5-34  U/L





 


Alanine Aminotransferase


(ALT/SGPT) 


 


 11 


 


 0-55  U/L





 


Alkaline Phosphatase   181 H    U/L


 


Total Protein   7.0   6.4-8.2  GM/DL


 


Albumin   3.7   3.2-4.5  GM/DL


 


Urine Opiates Screen   NEGATIVE   NEGATIVE  


 


Urine Oxycodone Screen   NEGATIVE   NEGATIVE  


 


Urine Methadone Screen   NEGATIVE   NEGATIVE  


 


Urine Propoxyphene Screen   NEGATIVE   NEGATIVE  


 


Urine Barbiturates Screen   NEGATIVE   NEGATIVE  


 


Ur Tricyclic Antidepressants


Screen 


 


 NEGATIVE 


 


 NEGATIVE  





 


Urine Phencyclidine Screen   NEGATIVE   NEGATIVE  


 


Urine Amphetamines Screen   POSITIVE H  NEGATIVE  


 


Urine Methamphetamines Screen   POSITIVE H  NEGATIVE  


 


Urine Benzodiazepines Screen   NEGATIVE   NEGATIVE  


 


Urine Cocaine Screen   NEGATIVE   NEGATIVE  


 


Urine Cannabinoids Screen   NEGATIVE   NEGATIVE  











Physical Exam-([facility])


Physical Exam


Vital Signs





                          VS - Last 72 Hours, by Label








 20





 09:39 14:15 14:51 15:15


 


Temp 36.5 36.7 37.2 37.2


 


Pulse 134 125 104 104


 


Resp 20 17 20 20


 


B/P (MAP) 123/88 (100) 110/87 (100) 125/76 125/76


 


Pulse Ox 99 99 99 99


 


O2 Delivery Room Air Room Air Room Air Room Air


 


    





 20 





 15:18 16:00 17:09 


 


Temp  37.5  


 


Pulse  122 120 


 


Resp  20  


 


B/P (MAP)  121/83 (96)  


 


Pulse Ox 99 99  


 


O2 Delivery Room Air Room Air  





Capillary Refill : Less Than 3 Seconds


General Appearance:  WD/WN, no apparent distress


Respiratory:  lungs clear, normal breath sounds


Cardiovascular:  regular rate, rhythm, no murmur


Gastrointestinal:  normal bowel sounds, non tender, soft


Extremities:  No pedal edema


Neurologic/Psychiatric:  alert, normal mood/affect


Skin:  other (dressing on posterior neck with no drainage)





Assessment/Plan


Assessment/Plan


Admission Status:  Inpatient Order (span 2 midnights)


Reason for Inpatient Admission:  


Sepsis with uncontrolled diabetes





(1) Sepsis


Status:  Acute


Assessment & Plan:  Secondary to abscess/cellulitis. Tachycardia and 

leukocytosis. Lactic acid okay. Zosyn, vancomycin.





(2) Abscess


Status:  Acute


Assessment & Plan:  Surgery consulted due to large size, drained this evening.





(3) Hyponatremia


Status:  Acute


Assessment & Plan:  Pseudohyponatremia due to hyperglycemia





(4) Methamphetamine use


Status:  Acute


Assessment & Plan:  Advised cessation.





(5) Uncontrolled type 1 diabetes mellitus


Status:  Acute


Assessment & Plan:  Sliding scale insulin.


Qualifiers:  


   Qualified Codes:  E10.65 - Type 1 diabetes mellitus with hyperglycemia


(6) DVT prophylaxis


Status:  Acute


Assessment & Plan:  SCDs








Clinical Quality Measures


DVT/VTE Risk/Contraindication:


Risk Factor Score Per Nursin


RFS Level Per Nursing on Admit:  1=Low/No VTE PPX





Copy


Copies To 1:   CB MENDES MD, BETHANY N MD              2020 17:18

## 2020-01-09 NOTE — DIAGNOSTIC IMAGING REPORT
PROCEDURE: CT neck soft tissue with contrast.



TECHNIQUE: Multiple contiguous axial images were obtained through

the neck after the administration of contrast. Auto Exposure

Controls were utilized during the CT exam to meet ALARA standards

for radiation dose reduction. 



INDICATION: Right-sided posterior neck lump.



COMPARISON: No prior studies are available for comparison.



FINDINGS: There is a heterogeneous, mixed density mass at the

area of palpable abnormality in the right posterior neck. This

commences near the base of the skull and extends inferiorly to

the level of the submandibular glands. This measures 8.4 cm

cephalocaudal x 5.9 cm transverse x 3.7 cm AP. This shows mixed

density with internal enhancing septations most likely

representing a subcutaneous abscess. No internal gas is

identified. Visualized intracranial structures are unremarkable.

Posterior nasopharynx and oropharynx are unremarkable.

Parapharyngeal fat planes are unremarkable. Submandibular and

parotid glands appear to be symmetric. There are bilateral

jugulodigastric and posterior cervical nodes which are likely

reactive. No other fluid collections are seen.



IMPRESSION: Complex, primarily fluid containing low-density mass

at the area of palpable abnormality in the posterior right neck.

This most likely represents a large abscess. No other significant

abnormality is detected.



Dictated by: 



  Dictated on workstation # [id number]

## 2020-01-09 NOTE — NUR
THIS RN NOTICED THAT ZOSYN 4.5G SCANNED AT 1806 WAS NOT RAN THROUGH IV PUMP. THIS RN STARTED 
VANCOMYCIN 1G AND ZOSYN 4.5G SIMULTANEOUSLY AT 2143.

## 2020-01-10 VITALS — DIASTOLIC BLOOD PRESSURE: 75 MMHG | SYSTOLIC BLOOD PRESSURE: 123 MMHG

## 2020-01-10 VITALS — DIASTOLIC BLOOD PRESSURE: 82 MMHG | SYSTOLIC BLOOD PRESSURE: 122 MMHG

## 2020-01-10 VITALS — SYSTOLIC BLOOD PRESSURE: 131 MMHG | DIASTOLIC BLOOD PRESSURE: 72 MMHG

## 2020-01-10 VITALS — DIASTOLIC BLOOD PRESSURE: 81 MMHG | SYSTOLIC BLOOD PRESSURE: 121 MMHG

## 2020-01-10 VITALS — DIASTOLIC BLOOD PRESSURE: 57 MMHG | SYSTOLIC BLOOD PRESSURE: 95 MMHG

## 2020-01-10 VITALS — DIASTOLIC BLOOD PRESSURE: 74 MMHG | SYSTOLIC BLOOD PRESSURE: 123 MMHG

## 2020-01-10 LAB
ALBUMIN SERPL-MCNC: 2.9 GM/DL (ref 3.2–4.5)
ALP SERPL-CCNC: 135 U/L (ref 40–136)
ALT SERPL-CCNC: 9 U/L (ref 0–55)
BASOPHILS # BLD AUTO: 0 10^3/UL (ref 0–0.1)
BASOPHILS NFR BLD AUTO: 0 % (ref 0–10)
BILIRUB SERPL-MCNC: 0.3 MG/DL (ref 0.1–1)
BUN/CREAT SERPL: 12
CALCIUM SERPL-MCNC: 7.7 MG/DL (ref 8.5–10.1)
CHLORIDE SERPL-SCNC: 101 MMOL/L (ref 98–107)
CO2 SERPL-SCNC: 24 MMOL/L (ref 21–32)
CREAT SERPL-MCNC: 0.82 MG/DL (ref 0.6–1.3)
EOSINOPHIL # BLD AUTO: 0.5 10^3/UL (ref 0–0.3)
EOSINOPHIL NFR BLD AUTO: 5 % (ref 0–10)
ERYTHROCYTE [DISTWIDTH] IN BLOOD BY AUTOMATED COUNT: 12.8 % (ref 10–14.5)
GFR SERPLBLD BASED ON 1.73 SQ M-ARVRAT: > 60 ML/MIN
GLUCOSE SERPL-MCNC: 267 MG/DL (ref 70–105)
HCT VFR BLD CALC: 37 % (ref 40–54)
HGB BLD-MCNC: 12.7 G/DL (ref 13.3–17.7)
LYMPHOCYTES # BLD AUTO: 2.1 X 10^3 (ref 1–4)
LYMPHOCYTES NFR BLD AUTO: 18 % (ref 12–44)
MANUAL DIFFERENTIAL PERFORMED BLD QL: NO
MCH RBC QN AUTO: 29 PG (ref 25–34)
MCHC RBC AUTO-ENTMCNC: 34 G/DL (ref 32–36)
MCV RBC AUTO: 85 FL (ref 80–99)
MONOCYTES # BLD AUTO: 1.4 X 10^3 (ref 0–1)
MONOCYTES NFR BLD AUTO: 12 % (ref 0–12)
NEUTROPHILS # BLD AUTO: 7.9 X 10^3 (ref 1.8–7.8)
NEUTROPHILS NFR BLD AUTO: 66 % (ref 42–75)
PLATELET # BLD: 277 10^3/UL (ref 130–400)
PMV BLD AUTO: 10.3 FL (ref 7.4–10.4)
POTASSIUM SERPL-SCNC: 3.6 MMOL/L (ref 3.6–5)
PROT SERPL-MCNC: 5.4 GM/DL (ref 6.4–8.2)
SODIUM SERPL-SCNC: 134 MMOL/L (ref 135–145)
WBC # BLD AUTO: 11.9 10^3/UL (ref 4.3–11)

## 2020-01-10 PROCEDURE — 0J940ZZ DRAINAGE OF RIGHT NECK SUBCUTANEOUS TISSUE AND FASCIA, OPEN APPROACH: ICD-10-PCS | Performed by: SURGERY

## 2020-01-10 RX ADMIN — KETOROLAC TROMETHAMINE PRN MG: 30 INJECTION, SOLUTION INTRAMUSCULAR; INTRAVENOUS at 04:53

## 2020-01-10 RX ADMIN — VANCOMYCIN HYDROCHLORIDE SCH MLS/HR: 500 INJECTION, POWDER, LYOPHILIZED, FOR SOLUTION INTRAVENOUS at 04:47

## 2020-01-10 RX ADMIN — INSULIN ASPART SCH UNIT: 100 INJECTION, SOLUTION INTRAVENOUS; SUBCUTANEOUS at 11:31

## 2020-01-10 RX ADMIN — SODIUM CHLORIDE SCH MLS/HR: 900 INJECTION, SOLUTION INTRAVENOUS at 10:35

## 2020-01-10 RX ADMIN — SODIUM CHLORIDE SCH MLS/HR: 900 INJECTION, SOLUTION INTRAVENOUS at 11:31

## 2020-01-10 RX ADMIN — INSULIN ASPART SCH UNIT: 100 INJECTION, SOLUTION INTRAVENOUS; SUBCUTANEOUS at 16:30

## 2020-01-10 RX ADMIN — INSULIN ASPART SCH UNIT: 100 INJECTION, SOLUTION INTRAVENOUS; SUBCUTANEOUS at 05:23

## 2020-01-10 RX ADMIN — KETOROLAC TROMETHAMINE PRN MG: 30 INJECTION, SOLUTION INTRAMUSCULAR; INTRAVENOUS at 11:42

## 2020-01-10 RX ADMIN — SODIUM CHLORIDE SCH MLS/HR: 900 INJECTION, SOLUTION INTRAVENOUS at 04:22

## 2020-01-10 RX ADMIN — KETOROLAC TROMETHAMINE PRN MG: 30 INJECTION, SOLUTION INTRAMUSCULAR; INTRAVENOUS at 17:42

## 2020-01-10 RX ADMIN — SODIUM CHLORIDE SCH MLS/HR: 900 INJECTION, SOLUTION INTRAVENOUS at 18:46

## 2020-01-10 RX ADMIN — VANCOMYCIN HYDROCHLORIDE SCH MLS/HR: 500 INJECTION, POWDER, LYOPHILIZED, FOR SOLUTION INTRAVENOUS at 22:48

## 2020-01-10 RX ADMIN — ACETAMINOPHEN PRN MG: 500 TABLET ORAL at 22:52

## 2020-01-10 RX ADMIN — SODIUM CHLORIDE SCH MLS/HR: 900 INJECTION, SOLUTION INTRAVENOUS at 05:49

## 2020-01-10 RX ADMIN — VANCOMYCIN HYDROCHLORIDE SCH MLS/HR: 500 INJECTION, POWDER, LYOPHILIZED, FOR SOLUTION INTRAVENOUS at 14:58

## 2020-01-10 RX ADMIN — INSULIN ASPART SCH UNIT: 100 INJECTION, SOLUTION INTRAVENOUS; SUBCUTANEOUS at 20:51

## 2020-01-10 RX ADMIN — ACETAMINOPHEN PRN MG: 500 TABLET ORAL at 10:04

## 2020-01-10 RX ADMIN — SODIUM CHLORIDE SCH MLS/HR: 900 INJECTION, SOLUTION INTRAVENOUS at 16:19

## 2020-01-10 NOTE — PROGRESS NOTE - SURGERY
SHANA NUNEZ [facility] 1/10/20 0715:


Subjective


Date Seen by a Provider:  Salvador 10, 2020


Time Seen by a Provider:  06:55


Subjective/Events-last exam


Pt reports having some tenderness still over the draining abscess, but his neck 

movement has become less painful. He denies any other associated symptoms. The 

abscess drainage site looks improved with decreased erythema from pre incision 

and drainage. It is still draining fluid, but not soaking through the gauze.


Review of Systems


General:  No Chills, No Fatigue


HEENT:  No Head Aches, No Visual Changes


Pulmonary:  No Dyspnea, No Cough


Cardiovascular:  No: Chest Pain, Palpitations


Gastrointestinal:  No: Nausea, Vomiting, Abdominal Pain


Musculoskeletal:  neck pain; No: back pain


Neurological:  No: Weakness, Numbness





Focused Exam


Lactate Level


20 11:42: Lactic Acid Level 1.78





Objective


Exam





Vital Signs








  Date Time  Temp Pulse Resp B/P (MAP) Pulse Ox O2 Delivery O2 Flow Rate FiO2


 


1/10/20 04:00 36.5 88 20 122/82 (95) 97 Room Air  


 


1/10/20 01:00  82      


 


1/10/20 00:00 36.4 84 18 95/57 (70) 98 Room Air  


 


20 20:00 36.8 117 20 114/70 (85) 97 Room Air  


 


20 20:00      Room Air  


 


20 19:00  120      


 


20 17:09  120      


 


20 16:00 37.5 122 20 121/83 (96) 99 Room Air  


 


20 15:18     99 Room Air  


 


20 15:15 37.2 104 20 125/76 99 Room Air  


 


20 14:51 37.2 104 20 125/76 99 Room Air  


 


20 14:15 36.7 125 17 110/87 (100) 99 Room Air  


 


20 09:39 36.5 134 20 123/88 (100) 99 Room Air  














I & O 


 


 1/10/20





 07:00


 


Intake Total 4198.5 ml


 


Output Total 725 ml


 


Balance 3473.5 ml





Capillary Refill : Less Than 3 Seconds


General Appearance:  No Apparent Distress, WD/WN


HEENT:  PERRL/EOMI; No Scleral Icterus (L), No Scleral Icterus (R)


Neck:  Supple, Other (Tender over abscess drainage site)


Respiratory:  Chest Non Tender, Lungs Clear, Normal Breath Sounds, No Accessory 

Muscle Use, No Respiratory Distress


Cardiovascular:  Regular Rate, Rhythm, No Edema, Normal Peripheral Pulses


Peripheral Pulses:  2+ Dorsalis Pedis (R), 2+ Left Dors-Pedis (L), 2+ Radial 

Pulses (R), 2+ Radial Pulses (L)


Gastrointestinal:  normal bowel sounds, non tender, soft


Extremity:  No Calf Tenderness, No Pedal Edema


Neurologic/Psychiatric:  Alert, Oriented x3, Normal Mood/Affect


Skin:  Normal Color, Warm/Dry


Lymphatic:  No Adenopathy





Results


Lab


Laboratory Tests


20 11:41: Glucometer 381H


20 11:42: Lactic Acid Level 1.78


20 12:00: 


White Blood Count 16.5H, Red Blood Count 5.17, Hemoglobin 15.2, Hematocrit 44, 

Mean Corpuscular Volume 84, Mean Corpuscular Hemoglobin 29, Mean Corpuscular 

Hemoglobin Concent 35, Red Cell Distribution Width 13.0, Platelet Count 363, 

Mean Platelet Volume 10.3, Neutrophils (%) (Auto) 78H, Lymphocytes (%) (Auto) 

12, Monocytes (%) (Auto) 9, Eosinophils (%) (Auto) 1, Basophils (%) (Auto) 0, 

Neutrophils # (Auto) 12.8H, Lymphocytes # (Auto) 1.9, Monocytes # (Auto) 1.5H, 

Eosinophils # (Auto) 0.2, Basophils # (Auto) 0.0, Neutrophils % (Manual) 85, Lym

phocytes % (Manual) 10, Monocytes % (Manual) 5, Blood Morphology Comment NORMAL,

Prothrombin Time 13.1, INR Comment 1.0, Activated Partial Thromboplast Time 33, 

Urine Color YELLOW, Urine Clarity CLEAR, Urine pH 6.5, Urine Specific Gravity 

<=1.005, Urine Protein NEGATIVE, Urine Glucose (UA) 3+H, Urine Ketones NEGATIVE,

Urine Nitrite NEGATIVE, Urine Bilirubin NEGATIVE, Urine Urobilinogen 0.2, Urine 

Leukocyte Esterase NEGATIVE, Urine RBC (Auto) TRACE-L, Urine RBC RARE, Urine WBC

NONE, Urine Squamous Epithelial Cells 0-2, Urine Crystals NONE, Urine Bacteria 

NEGATIVE, Urine Casts NONE, Urine Mucus NEGATIVE, Urine Culture Indicated NO, 

Sodium Level 134L, Potassium Level 3.9, Chloride Level 96L, Carbon Dioxide Level

26, Anion Gap 12, Blood Urea Nitrogen 4L, Creatinine 1.01, Estimat Glomerular 

Filtration Rate > 60, BUN/Creatinine Ratio 4, Glucose Level 415*H, Calcium Level

9.1, Corrected Calcium 9.3, Total Bilirubin 0.3, Aspartate Amino Transf 

(AST/SGOT) 7, Alanine Aminotransferase (ALT/SGPT) 11, Alkaline Phosphatase 181H,

Total Protein 7.0, Albumin 3.7, Urine Opiates Screen NEGATIVE, Urine Oxycodone 

Screen NEGATIVE, Urine Methadone Screen NEGATIVE, Urine Propoxyphene Screen 

NEGATIVE, Urine Barbiturates Screen NEGATIVE, Ur Tricyclic Antidepressants 

Screen NEGATIVE, Urine Phencyclidine Screen NEGATIVE, Urine Amphetamines Screen 

POSITIVEH, Urine Methamphetamines Screen POSITIVEH, Urine Benzodiazepines Screen

NEGATIVE, Urine Cocaine Screen NEGATIVE, Urine Cannabinoids Screen NEGATIVE


20 15:48: Glucometer 307H


20 19:30: Glucometer 172H


20 20:40: Glucometer 291H


1/10/20 00:16: Glucometer 300H


1/10/20 05:00: 


White Blood Count 11.9H, Red Blood Count 4.35, Hemoglobin 12.7L, Hematocrit 37L,

Mean Corpuscular Volume 85, Mean Corpuscular Hemoglobin 29, Mean Corpuscular 

Hemoglobin Concent 34, Red Cell Distribution Width 12.8, Platelet Count 277, 

Mean Platelet Volume 10.3, Neutrophils (%) (Auto) 66, Lymphocytes (%) (Auto) 18,

Monocytes (%) (Auto) 12, Eosinophils (%) (Auto) 5, Basophils (%) (Auto) 0, 

Neutrophils # (Auto) 7.9H, Lymphocytes # (Auto) 2.1, Monocytes # (Auto) 1.4H, 

Eosinophils # (Auto) 0.5H, Basophils # (Auto) 0.0, Sodium Level 134L, Potassium 

Level 3.6, Chloride Level 101, Carbon Dioxide Level 24, Anion Gap 9, Blood Urea 

Nitrogen 10, Creatinine 0.82, Estimat Glomerular Filtration Rate > 60, 

BUN/Creatinine Ratio 12, Glucose Level 267H, Calcium Level 7.7L, Corrected 

Calcium 8.6, Total Bilirubin 0.3, Aspartate Amino Transf (AST/SGOT) 9, Alanine 

Aminotransferase (ALT/SGPT) 9, Alkaline Phosphatase 135, Total Protein 5.4L, 

Albumin 2.9L


1/10/20 05:15: Glucometer 232H





Assessment/Plan


right posterior neck abscess


diabetes


methamphetamine use


leukocytosis





patient on vanc/zosyn


patient understands need for dressing changes and wound care.


cultures to be obtained


will follow





Clinical Quality Measures


DVT/VTE Risk/Contraindication:


Risk Factor Score Per Nursin


RFS Level Per Nursing on Admit:  1=Low/No VTE PPX





NGOC CHANG DO 1/10/20 2017:


Subjective


Subjective/Events-last exam


Less pain.  feeling better.  feels like having some chills at times.  pain 

better. May have fever.  No new complaints.  Denies n/v sweats shortness of 

breath or chest pain.





Objective


Exam


General Appearance:  No Apparent Distress


HEENT:  PERRL/EOMI


Neck:  Supple, Other (Tender over abscess drainage site right posterior neck)


Respiratory:  Chest Non Tender, No Accessory Muscle Use, No Respiratory Distress


Cardiovascular:  Regular Rate, Rhythm


Gastrointestinal:  normal bowel sounds, non tender, soft


Extremity:  Non Tender, No Pedal Edema


Neurologic/Psychiatric:  Alert, Oriented x3


Skin:  Warm/Dry, Other (minimal erythema right posterior neck,  wound packed,  

tender dealing with dressing.)





Assessment/Plan


right posterior neck abscess


diabetes


methamphetamine use


leukocytosis


continue daily and prn wound care


await cult/sensitivities 


continue abx





Supervisory-Addendum Brief


Verification & Attestation


Participated in pt care:  history, MDM, physical


Personally performed:  exam, history, MDM, supervision of care


Care discussed with:  Medical Student


Procedures:  n/a


Results interpretation:  Verified all documentation


Verification and Attestation of Medical Student E/M Service





A medical student performed and documented this service in my presence. I 

reviewed and verified all information documented by the medical student and made

modifications to such information, when appropriate. I personally performed the 

physical exam and medical decision making. 





 Ngoc Chang, Salvador 10, 2020,20:16











SHANA NUNEZ [facility]       Salvador 10, 2020 07:15


NGOC CHANG DO              Salvador 10, 2020 20:17

## 2020-01-10 NOTE — OPERATIVE REPORT
DATE OF SERVICE:  01/09/2020



PREOPERATIVE DIAGNOSIS:

Right posterior neck abscess.



POSTOPERATIVE DIAGNOSIS:

Right posterior neck abscess.



PROCEDURE:

Incision and drainage of right posterior neck abscess.



SURGEON:

Ngoc Chang DO



ANESTHESIA:

Local anesthetic.



ESTIMATED BLOOD LOSS:

Minimal.



COMPLICATIONS:

None.



INDICATIONS:

The patient is a 29-year-old male with a right posterior neck abscess.  He

understands risks and benefits of procedure and wished to proceed with

procedure.  Consent was signed in the chart.



DESCRIPTION OF PROCEDURE:

The patient was prepped and draped in sterile fashion.  Timeout was performed. 

Local anesthetic was infiltrated over the area of fluctuance.  A 15 blade

scalpel was used to make a skin incision, purulent material erupted, culture was

obtained.  Hemostat was used to break up all loculations.  Wound was then

irrigated with copious amounts of irrigation.  Wound was then packed with

iodoform gauze and the area was washed and dried and sterile bandage was

applied.  The patient tolerated procedure well without any complications.





Job ID: [id number]

DocumentID: [id number]

Dictated Date:  01/09/2020 21:16:36

Transcription Date: 01/10/2020 04:55:37

Dictated By: NGOC CHANG DO

## 2020-01-10 NOTE — PROGRESS NOTE
Subjective


Subjective/Events-last exam


Afebrile. States he is still having bad chills off and on and just doesn't feel 

well.





Focused Exam


Lactate Level


20 11:42: Lactic Acid Level 1.78





Objective


Exam


Last Set of Vital Signs





Vital Signs








  Date Time  Temp Pulse Resp B/P (MAP) Pulse Ox O2 Delivery O2 Flow Rate FiO2


 


1/10/20 11:58 39.2 105 18 123/74 (90) 96 Room Air  





Capillary Refill : Less Than 3 Seconds


I&O











Intake and Output 


 


 1/10/20





 00:00


 


Intake Total 2938.5 ml


 


Output Total 275 ml


 


Balance 2663.5 ml


 


 


 


Intake Oral 2196 ml


 


IV Total 742.5 ml


 


Output Urine Total 275 ml


 


Daily Weight Change No





 No








General:  Other (drowsy)


Lungs:  Clear to Auscultation, Normal Air Movement


Heart:  Regular Rate, No Murmurs


Neuro:  Normal Speech


Psych/Mental Status:  Mood NL





Results/Procedures


Lab


Laboratory Tests


20 15:48: Glucometer 307H


20 19:30: Glucometer 172H


20 20:40: Glucometer 291H


1/10/20 00:16: Glucometer 300H


1/10/20 05:00: 


White Blood Count 11.9H, Red Blood Count 4.35, Hemoglobin 12.7L, Hematocrit 37L,

Mean Corpuscular Volume 85, Mean Corpuscular Hemoglobin 29, Mean Corpuscular 

Hemoglobin Concent 34, Red Cell Distribution Width 12.8, Platelet Count 277, 

Mean Platelet Volume 10.3, Neutrophils (%) (Auto) 66, Lymphocytes (%) (Auto) 18,

Monocytes (%) (Auto) 12, Eosinophils (%) (Auto) 5, Basophils (%) (Auto) 0, 

Neutrophils # (Auto) 7.9H, Lymphocytes # (Auto) 2.1, Monocytes # (Auto) 1.4H, 

Eosinophils # (Auto) 0.5H, Basophils # (Auto) 0.0, Sodium Level 134L, Potassium 

Level 3.6, Chloride Level 101, Carbon Dioxide Level 24, Anion Gap 9, Blood Urea 

Nitrogen 10, Creatinine 0.82, Estimat Glomerular Filtration Rate > 60, 

BUN/Creatinine Ratio 12, Glucose Level 267H, Calcium Level 7.7L, Corrected 

Calcium 8.6, Total Bilirubin 0.3, Aspartate Amino Transf (AST/SGOT) 9, Alanine 

Aminotransferase (ALT/SGPT) 9, Alkaline Phosphatase 135, Total Protein 5.4L, 

Albumin 2.9L


1/10/20 05:15: Glucometer 232H


1/10/20 11:21: Glucometer 281H


1/10/20 12:40: 





Microbiology


20 Gram Stain, Resulted


         Pending


20 Wound Culture - Preliminary, Resulted


         Staphylococcus aureus





Assessment/Plan


Assessment/Plan





(1) Sepsis


Status:  Acute


Assessment & Plan:  Secondary to abscess/cellulitis. Tachycardia and 

leukocytosis. Lactic acid okay. Zosyn, vancomycin.


1/10 afebrile overnight, continue zosyn and vanc while awaiting culture results





(2) Abscess


Status:  Acute


Assessment & Plan:  Surgery consulted due to large size, drained 1/ pm.





(3) Hyponatremia


Status:  Acute


Assessment & Plan:  Pseudohyponatremia due to hyperglycemia





(4) Methamphetamine use


Status:  Acute


Assessment & Plan:  Advised cessation.





(5) Uncontrolled type 1 diabetes mellitus


Status:  Acute


Assessment & Plan:  Sliding scale insulin. Pt requested to use own insulin pump.


Qualifiers:  


   Qualified Codes:  E10.65 - Type 1 diabetes mellitus with hyperglycemia


(6) DVT prophylaxis


Status:  Acute


Assessment & Plan:  SCDs








Clinical Quality Measures


DVT/VTE Risk/Contraindication:


Risk Factor Score Per Nursin


RFS Level Per Nursing on Admit:  1=Low/No VTE PPX











CHICHI GASPAR MD             Salvador 10, 2020 13:14

## 2020-01-10 NOTE — NUR
THIS RN INFORMED PT OF BLOOD SUGAR LEVEL  AND THAT PER OUR SLIDING SCALE INSULIN, 
3UNITS OF INSULIN IS TO BE ADMINISTERED. THIS RN WITNESSED PT PROGRAM PERSONAL INSULIN PUMP 
TO ADMINISTER 3UNITS OF INSULIN.

## 2020-01-11 VITALS — DIASTOLIC BLOOD PRESSURE: 87 MMHG | SYSTOLIC BLOOD PRESSURE: 134 MMHG

## 2020-01-11 VITALS — DIASTOLIC BLOOD PRESSURE: 71 MMHG | SYSTOLIC BLOOD PRESSURE: 123 MMHG

## 2020-01-11 VITALS — SYSTOLIC BLOOD PRESSURE: 123 MMHG | DIASTOLIC BLOOD PRESSURE: 74 MMHG

## 2020-01-11 VITALS — SYSTOLIC BLOOD PRESSURE: 118 MMHG | DIASTOLIC BLOOD PRESSURE: 79 MMHG

## 2020-01-11 LAB
BUN/CREAT SERPL: 11
CALCIUM SERPL-MCNC: 7.2 MG/DL (ref 8.5–10.1)
CHLORIDE SERPL-SCNC: 107 MMOL/L (ref 98–107)
CO2 SERPL-SCNC: 18 MMOL/L (ref 21–32)
CREAT SERPL-MCNC: 0.74 MG/DL (ref 0.6–1.3)
ERYTHROCYTE [DISTWIDTH] IN BLOOD BY AUTOMATED COUNT: 12.2 % (ref 10–14.5)
GFR SERPLBLD BASED ON 1.73 SQ M-ARVRAT: > 60 ML/MIN
GLUCOSE SERPL-MCNC: 234 MG/DL (ref 70–105)
HCT VFR BLD CALC: 34 % (ref 40–54)
HGB BLD-MCNC: 11.9 G/DL (ref 13.3–17.7)
MCH RBC QN AUTO: 29 PG (ref 25–34)
MCHC RBC AUTO-ENTMCNC: 35 G/DL (ref 32–36)
MCV RBC AUTO: 85 FL (ref 80–99)
PLATELET # BLD: 237 10^3/UL (ref 130–400)
PMV BLD AUTO: 10 FL (ref 7.4–10.4)
POTASSIUM SERPL-SCNC: 3.6 MMOL/L (ref 3.6–5)
SODIUM SERPL-SCNC: 135 MMOL/L (ref 135–145)
WBC # BLD AUTO: 3.7 10^3/UL (ref 4.3–11)

## 2020-01-11 RX ADMIN — SODIUM CHLORIDE SCH MLS/HR: 900 INJECTION, SOLUTION INTRAVENOUS at 10:47

## 2020-01-11 RX ADMIN — SODIUM CHLORIDE SCH MLS/HR: 900 INJECTION, SOLUTION INTRAVENOUS at 01:55

## 2020-01-11 RX ADMIN — VANCOMYCIN HYDROCHLORIDE SCH MLS/HR: 500 INJECTION, POWDER, LYOPHILIZED, FOR SOLUTION INTRAVENOUS at 16:31

## 2020-01-11 RX ADMIN — KETOROLAC TROMETHAMINE PRN MG: 30 INJECTION, SOLUTION INTRAMUSCULAR; INTRAVENOUS at 01:19

## 2020-01-11 RX ADMIN — KETOROLAC TROMETHAMINE PRN MG: 30 INJECTION, SOLUTION INTRAMUSCULAR; INTRAVENOUS at 10:47

## 2020-01-11 RX ADMIN — VANCOMYCIN HYDROCHLORIDE SCH MLS/HR: 500 INJECTION, POWDER, LYOPHILIZED, FOR SOLUTION INTRAVENOUS at 07:01

## 2020-01-11 RX ADMIN — SODIUM CHLORIDE SCH MLS/HR: 900 INJECTION, SOLUTION INTRAVENOUS at 04:55

## 2020-01-11 RX ADMIN — SODIUM CHLORIDE SCH MLS/HR: 900 INJECTION, SOLUTION INTRAVENOUS at 03:00

## 2020-01-11 RX ADMIN — SODIUM CHLORIDE SCH MLS/HR: 900 INJECTION, SOLUTION INTRAVENOUS at 10:56

## 2020-01-11 RX ADMIN — INSULIN ASPART SCH UNIT: 100 INJECTION, SOLUTION INTRAVENOUS; SUBCUTANEOUS at 06:25

## 2020-01-11 RX ADMIN — INSULIN ASPART SCH UNIT: 100 INJECTION, SOLUTION INTRAVENOUS; SUBCUTANEOUS at 10:41

## 2020-01-11 NOTE — PROGRESS NOTE - SURGERY
Subjective


Time Seen by a Provider:  16:10


Subjective/Events-last exam


Pt seen and examined, he wants to go home.  States pain is controlled and he is 

tolerating diet.  He is going on vacation tomorrow and needs to leave.  He 

states he has taken care of abscess before.


Review of Systems


General:  No Chills, No Night Sweats


Cardiovascular:  No: Chest Pain, Palpitations


Gastrointestinal:  No: Nausea, Vomiting





Focused Exam


Lactate Level


20 11:42: Lactic Acid Level 1.78





Objective


Exam





Vital Signs








  Date Time  Temp Pulse Resp B/P (MAP) Pulse Ox O2 Delivery O2 Flow Rate FiO2


 


20 12:29  76      


 


20 11:54 36.9 78 20 134/87 (103) 98 Room Air  


 


20 08:39      Room Air  


 


20 07:57 36.4 83 16 118/79 (92) 97 Room Air  


 


20 07:00  66      


 


20 04:45 36.6 74 18 123/74 (90) 95 Room Air  


 


20 01:00  102      


 


20 00:45 37.5 93 18 123/71 (88) 96 Room Air  


 


1/10/20 20:50      Room Air  


 


1/10/20 20:22 37.7 97 18 131/72 (91) 98 Room Air  


 


1/10/20 19:00  98      


 


1/10/20 18:15 38.5       


 


1/10/20 17:42 38.5       














I & O 


 


 20





 07:00


 


Intake Total 5816 ml


 


Output Total 1650 ml


 


Balance 4166 ml





Capillary Refill : Less Than 3 Seconds


General Appearance:  No Apparent Distress, WD/WN, Thin


HEENT:  PERRL/EOMI, Moist Mucous Membranes


Neck:  Supple, Other (No erythema, but there is ruma purulent drainage from site,

right posterior neck)


Respiratory:  Chest Non Tender, Lungs Clear, Normal Breath Sounds, No Accessory 

Muscle Use, No Respiratory Distress


Cardiovascular:  Regular Rate, Rhythm, No Murmur


Peripheral Pulses:  2+ Dorsalis Pedis (R), 2+ Left Dors-Pedis (L), 2+ Radial 

Pulses (R), 2+ Radial Pulses (L)


Gastrointestinal:  normal bowel sounds, non tender, soft


Extremity:  Non Tender, No Pedal Edema


Skin:  Tattoos/Piercings





Results


Lab


Laboratory Tests


1/10/20 20:37: Glucometer 304H


20 05:32: 


White Blood Count 3.7L, Red Blood Count 4.07L, Hemoglobin 11.9L, Hematocrit 34L,

Mean Corpuscular Volume 85, Mean Corpuscular Hemoglobin 29, Mean Corpuscular 

Hemoglobin Concent 35, Red Cell Distribution Width 12.2, Platelet Count 237, 

Mean Platelet Volume 10.0, Sodium Level 135, Potassium Level 3.6, Chloride Level

107, Carbon Dioxide Level 18L, Anion Gap 10, Blood Urea Nitrogen 8, Creatinine 

0.74, Estimat Glomerular Filtration Rate > 60, BUN/Creatinine Ratio 11, Glucose 

Level 234H, Calcium Level 7.2L


20 05:35: Glucometer 214H


20 10:37: Glucometer 163H





Microbiology


20 Gram Stain - Final, Resulted


         


20 Wound Culture - Preliminary, Resulted


         Staphylococcus aureus


20 Blood Culture - Preliminary, Resulted


         No growth





Assessment/Plan


Right Posterior neck Abscess





The area looks good, drained well and should use warm compresses and keep the 

area clean and packed.  OK to d/c home, needs to continue taking ABX.





Clinical Quality Measures


DVT/VTE Risk/Contraindication:


Risk Factor Score Per Nursin


RFS Level Per Nursing on Admit:  1=Low/No VTE PPX











HETAL CONRAD DO               2020 16:48

## 2020-01-11 NOTE — NUR
PT STATED HE IS GETTING ANXIOUS AND WANTS TO LEAVE. ADVISED HIM AGAIN WE ARE WAITING ON 
SURGEON TO DECIDE ON DISCHARGE. PT STATES HE WILL LEAVE AMA IF HE HAS TO. REQUESTED HE LET 
ME TRY TO CONTACT  



DISCUSSED WITH SHASHANK, CHARGE NX, AND ADVISED HER. SHE SAID [facility] IS COVERING FOR [place] AND 
 IS CURRENTLY IN SURGERY. 



ADVISED PT OF THIS AND HE SAID HE WILL WAIT. HE STATES HE WANTS TO GO AND GET HIS STUFF 
READY FOR HIS TRIP. I REQUESTED HE ALLOW  TO COME SEE HIM.

## 2020-01-11 NOTE — DISCHARGE SUMMARY
Discharge Summary


Hospital Course


Was the Problem List Reviewed?:  Yes


Problems/Dx:  


(1) Sepsis


Status:  Acute


(2) Abscess


Status:  Acute


(3) Uncontrolled type 1 diabetes mellitus


Status:  Acute


Qualifiers:  


   Qualified Codes:  E10.65 - Type 1 diabetes mellitus with hyperglycemia


(4) Illicit drug use


Status:  Acute


(5) Methamphetamine use


Status:  Acute


(6) Non-compliance


Status:  Acute


(7) Marijuana use


Status:  Chronic


Hospital Course


Date of Admission: 2020 at 12:45 


Admission Diagnosis :  





Family Physician/Provider: Cb Zamorano MD  





Date of Discharge: 20 


Discharge Diagnosis: Right posterior scalp abscess, type I diabetes insulin pump

dependent, illicit drug abuse








Hospital Course:


Patient had an uneventful hospital course after 3 days he presented with abscess

of the right posterior scalp requiring incision and drainage MSSA on culture 

placed on vancomycin and broad-spectrum antibiotics transition to Augmentin 875 

twice daily and patient was felt to be stable and will be discharged with 

dressing changes per general surgery and have close follow-up with [facility] in one week.














Labs and Pending Lab Test:


Laboratory Tests


1/10/20 16:41: Glucometer 276H


1/10/20 20:37: Glucometer 304H


20 05:32: 


White Blood Count 3.7L, Red Blood Count 4.07L, Hemoglobin 11.9L, Hematocrit 34L,

Mean Corpuscular Volume 85, Mean Corpuscular Hemoglobin 29, Mean Corpuscular 

Hemoglobin Concent 35, Red Cell Distribution Width 12.2, Platelet Count 237, 

Mean Platelet Volume 10.0, Sodium Level 135, Potassium Level 3.6, Chloride Level

107, Carbon Dioxide Level 18L, Anion Gap 10, Blood Urea Nitrogen 8, Creatinine 

0.74, Estimat Glomerular Filtration Rate > 60, BUN/Creatinine Ratio 11, Glucose 

Level 234H, Calcium Level 7.2L


20 05:35: Glucometer 214H


20 10:37: Glucometer 163H





Microbiology


20 Gram Stain - Final, Resulted


         


20 Wound Culture - Preliminary, Resulted


         Staphylococcus aureus


20 Blood Culture - Preliminary, Resulted


         No growth





Home Meds


Active


Augmentin 875-125 Tablet (Amoxicillin/Potassium Clav) 1 Each Tablet 1 Each PO 

BID


Reported


Humalog (Insulin Lispro) 100 Unit/1 Ml Vial  SC UD


     80 UNITS PER DAY PER INSULIN PUMP


Assessment/Pt Instructions


[facility] in one week


Discharge Planning:  <30 minutes discharge planning





Discharge Instructions


Discharge Diet:  ADA Diet


Activity as Tolerated:  Yes





Discharge Physical Examination


Vital Signs





Vital Signs








  Date Time  Temp Pulse Resp B/P (MAP) Pulse Ox O2 Delivery O2 Flow Rate FiO2


 


20 12:29  76      


 


20 11:54 36.9  20 134/87 (103) 98 Room Air  








General Appearance:  No Apparent Distress, WD/WN, Chronically ill, Thin


Respiratory:  Chest Non Tender, Lungs Clear, Normal Breath Sounds, No Accessory 

Muscle Use, No Respiratory Distress


Cardiovascular:  Regular Rate, Rhythm, No Edema, No Gallop, No JVD, No Murmur, 

Normal Peripheral Pulses


Skin:  Other (right posterior scalp abscess no erythema minimal drainage)


Neurologic/Psychiatric:  Alert, Oriented x3, No Motor/Sensory Deficits, Normal 

Mood/Affect


Allergies:  


Coded Allergies:  


     fluoxetine (Verified  Allergy, Unknown, 20)


     trazodone (Verified  Allergy, Unknown, 20)





Discharge Summary


Date of Admission


2020 at 12:45


Date of Discharge





Discharge Date:  2020





Clinical Quality Measures


DVT/VTE Risk/Contraindication:


Risk Factor Score Per Nursin


RFS Level Per Nursing on Admit:  1=Low/No VTE PPX











OLEG PIERSON DO                2020 13:13

## 2020-01-11 NOTE — NUR
SAW PT AND AGREED TO DISCHARGE. D/C'D IV, CATH INTACT. REVIEWED D/C INSTRUCTION W PT AND 
HIS DAD. NO QUESTIONS FROM EITHER ONE.

## 2020-01-13 NOTE — PHYSICIAN QUERY CLARIFICATION
PQ-Further Specificity


Admission/Discharge


Admission Date: Jan 9, 2020 at 12:45 


Discharge Date:  Jan 11, 2020 at 16:46





The medical record reflects the following clinical scenario:





History/Risk Factors:


Sepsis, Abscess on back of head/neck





Clinical Findings:


Rt posterior neck absces





Treatment:


I&D rt posterior neck abscess. A 15 blade scalpel was used to make a skin 

incision, purulent material erupted, culture was


obtained.  Hemostat was used to break up all loculations.  Wound was then


irrigated with copious amounts of irrigation.  Wound was then packed.





Question:  Can you further specify the depth of I&D at posterior neck per the 

clinical indicators above? 


Please document a response in the Progress Notes or Discharge Summary.





1. Skin





2. Subcutaneous





3. Muscle





4. Other, with explanation of the clinical findings.





5. Clinically undetermined, no explanation for the clinical findings.





PHYSICIAN RESPONSE


Can you specify per above:  2


Please remember a lack of response to the above will prompt a phone page by 

CDI/Coding staff. 





In responding to this query, please exercise your independent professional 

judgment.  The purpose of this communication is to more accurately reflect the 

complexity of your patients condition. The fact that a question is asked does 

not imply that any particular answer is desired or expected.  





Thank you for your timely response to this clarification.      


   


Requestors name: Sabina   





Phone # [phone]








THIS PHYSICIAN QUERY FORM IS A PERMANENT PART OF THE MEDICAL RECORD











JOSIE NG                 Jan 13, 2020 09:02


[facility] DO              Jan 28, 2020 14:53

## 2020-04-27 ENCOUNTER — HOSPITAL ENCOUNTER (EMERGENCY)
Dept: HOSPITAL 75 - ER | Age: 30
End: 2020-04-27
Payer: MEDICAID

## 2020-04-27 DIAGNOSIS — Z77.22: ICD-10-CM

## 2020-04-27 DIAGNOSIS — E11.40: ICD-10-CM

## 2020-04-27 DIAGNOSIS — L02.212: Primary | ICD-10-CM

## 2020-04-27 DIAGNOSIS — Z88.5: ICD-10-CM

## 2020-04-27 DIAGNOSIS — Z79.4: ICD-10-CM

## 2020-04-27 DIAGNOSIS — Z88.8: ICD-10-CM

## 2020-04-27 DIAGNOSIS — Z80.8: ICD-10-CM

## 2020-04-27 NOTE — ED GENERAL
General


Stated Complaint:  ABSCESS ON LOWER BACK


Source of Information:  Patient


Exam Limitations:  No Limitations





History of Present Illness


Date Seen by Provider:  Apr 27, 2020


Time Seen by Provider:  14:04


Initial Comments


To ER with reports abscess on his low back


Timing/Duration:  2-3 Days


Severity:  Moderate





Allergies and Home Medications


Allergies


Coded Allergies:  


     fluoxetine (Verified  Allergy, Unknown, 1/9/20)


     trazodone (Verified  Allergy, Unknown, 1/9/20)





Home Medications


Amoxicillin/Potassium Clav 1 Each Tablet, 1 EACH PO BID


   Prescribed by: OLEG PIERSON on 1/11/20 1312


Hydrocodone/Acetaminophen 1 Each Tablet, 1 EACH PO Q4-6HR PRN for PAIN-MODERATE


   Prescribed by: MARTA BOONE on 4/27/20 1408


Insulin Lispro 100 Unit/1 Ml Vial, SC UD, (Reported)


   80 UNITS PER DAY PER INSULIN PUMP 


Sulfamethoxazole/Trimethoprim 1 Each Tablet, 1 EACH PO BID


   Prescribed by: MARTA BOONE on 4/27/20 1407





Patient Home Medication List


Home Medication List Reviewed:  Yes





Past Medical-Social-Family Hx


Patient Social History


Drug of Choice:  THC, SMOKES METH-DENIES IV USE


Type Used:  Cigarettes


2nd Hand Smoke Exposure:  Yes


Recent Foreign Travel:  No


Contact w/Someone Who Travel:  No


Recent Hopitalizations:  No





Immunizations Up To Date


Tetanus Booster (TDap):  Unknown


PED Vaccines UTD:  Yes





Seasonal Allergies


Seasonal Allergies:  No





Past Medical History


Surgeries:  Yes (abscess drainage)


Orthopedic


Respiratory:  No


Asthma


Currently Using CPAP:  No


Currently Using BIPAP:  No


Cardiac:  No


Neurological:  No


Neuropathy


Reproductive Disorders:  No


Sexually Transmitted Disease:  No


HIV/AIDS:  No


Genitourinary:  Yes


Prostate Problems


Gastrointestinal:  Yes


Gastroesophageal Reflux, Liver Disease/Jaundice, Chronic Constipation


Musculoskeletal:  Yes (LEFT HIP FX/ORIF 2017)


Fractures


Endocrine:  Yes (TYPE 1 DIABETES, WITH INSULIN PUMP AS OF 01/09/20-EXTREME NON-

COMPLIANCE)


Diabetes, Insulin dep


HEENT:  Yes (DENTAL CARIES)


Cancer:  No


Psychosocial:  Yes (POLYSUBSTANCE)


Anxiety, Bipolar, Depression


Integumentary:  Yes


Eczema


Blood Disorders:  No


Adverse Reaction/Blood Tranf:  No





Family Medical History





Diabetes mellitus


  19 FATHER


  UNCLE


FH: COPD (chronic obstructive pulmonary disease)


  19 MOTHER


FH: brain cancer


  AUNT


FH: diverticulitis


  19 FATHER


Thyroid disease


  19 MOTHER


No Pertinent Family Hx





Physical Exam


Vital Signs


Capillary Refill :


Height, Weight, BMI


Height: 5'10.00"


Weight: 160lbs. 3.0oz. [id number]; 17.77 BMI


Method:Stated





Progress/Results/Core Measures


Suspected Sepsis


SIRS


Temperature: 


Pulse:  


Respiratory Rate: 


 


Blood Pressure  / 


Mean:





Results/Orders


Vital Signs/I&O


Capillary Refill :





Departure


Communication (Admissions)


Patient triaged desk, however prior to actually examining him he left. States 

that he'll return. I had already gone ahead and sent him a prescription for 

Bactrim based on previous culture and sensitivity reports and pain medication.





Impression





   Primary Impression:  


   Abscess


Disposition:  07 AGAINST MEDICAL ADVICE


Condition:  Against Medical Advice





Departure-Patient Inst.


Decision time for Depature:  14:04


Referrals:  


VERONICA MENDES MD (PCP/Family)


Primary Care Physician


Patient Instructions:  ABSCESS





Add. Discharge Instructions:  


1. Warm compresses to the area


2. You can shower letting the water run over this starting tonight. Antibiotics 

as directed. He medication as directed. Return here for any worsening.


Scripts


Sulfamethoxazole/Trimethoprim (Bactrim Ds Tablet) 1 Each Tablet


1 EACH PO BID, #14 TAB


   Prov: MARTA BOONE         4/27/20 


Hydrocodone/Acetaminophen (Lorcet 5-325 mg Tablet) 1 Each Tablet


1 EACH PO Q4-6HR PRN for PAIN-MODERATE MDD 10 for 7 Days, #10 TAB


   Prov: MARTA BOONE         4/27/20











MARTA BOONE             Apr 27, 2020 14:08

## 2020-04-28 ENCOUNTER — HOSPITAL ENCOUNTER (EMERGENCY)
Dept: HOSPITAL 75 - ER | Age: 30
Discharge: LEFT BEFORE DISCHARGE | End: 2020-04-28
Payer: MEDICAID

## 2020-04-28 DIAGNOSIS — L02.212: Primary | ICD-10-CM

## 2020-04-28 NOTE — XMS REPORT
[facility]

                             Created on: 2020



Dat Ruiz

External Reference #: [id number]

: 1990

Sex: Male



Demographics





                          Address                   [address] [place]  [address]

 

                          Preferred Language        Unknown

 

                          Marital Status            Unknown

 

                          Mormon Affiliation     Unknown

 

                          Race                      Unknown

 

                          Ethnic Group              Unknown





Author





                          Author                    Dat COSTELLO



 

                          Organization              [facility]

 

                          Address                   [address] [place]  [address]



 

                          Phone                     [phone]







Care Team Providers





                    Care Team Member Name Role                Phone

 

                    ALYSHA COSTELLO Unavailable         [phone]







PROBLEMS





          Type      Condition ICD9-CM Code VOC11-JU Code Onset Dates Condition S

tatus SNOMED 

Code

 

                Problem         Diabetic polyneuropathy associated with type 1 d

iabetes mellitus                 

E10.42                                  Active              37930094

 

          Problem   Type 1 diabetes mellitus without complication           E10.

9               Active    [id number]

 

           Problem    Erectile dysfunction due to diseases classified elsewhere 

           N52.1                 

Active                                  [id number]







ALLERGIES

No Information



ENCOUNTERS





                Encounter       Location        Date            Diagnosis

 

                    [place] [address] [address]                                 

 

                    [facility] [address] [address]                                 

 

                    [place] [address] [address] 10 

Dec, 2019                                

 

                    [facility] [address] [address]                                 

 

                    [place] [address] [address] 08 

2019                               Type 1 diabetes mellitus without complic

ation E10.9 and Long term 

(current) use of insulin Z79.4

 

                    [place] [address] [address]                                 

 

                    [place] [address] [address] 29 

Oct, 2019                                

 

                    [place] [address] [address] 15 

Oct, 2019                                

 

                    [facility] [address] [address] 10 

Oct, 2019                                

 

                    [facility] [address] [facility] [place] [address] 09 

Sep, 2019                                

 

                    [facility] [address] [facility] [place] [address] 09 

Sep, 2019                                

 

                    [facility] [address] [facility] [place] [address]                                Diabetes E11.9

 

                    [facility] [address] [id number] [place] [address]                                 

 

                    [facility] [address] [facility] [place] [address]                                Diabetes E11.9

 

                    [facility] [address] [place] [id number] [place] [address] 06 

May, 2019                                

 

                    [facility] [id number] [place] [address] 05 

Mar, 2019                                

 

                    [facility] [id number] [place] [address]                                 

 

                    [facility] [id number] [place] [address]                                 

 

                    [facility] [id number] [place] [address] 28 

Dec, 2018                               Diabetes E11.9

 

                    [facility] [id number] [place] [address] 25 

Oct, 2018                                

 

                    [facility] [id number] [place] [address] 22 

Oct, 2018                                

 

                    [facility] [address] [facility] [place] [address] 19 

Oct, 2018                               Diabetic polyneuropathy associated with 

type 1 diabetes mellitus 

E10.42

 

                    [facility] [id number] [place] [address] 15 

Oct, 2018                               Diabetes E11.9

 

                    [facility] [id number] [place] [address]                                 

 

                    [facility] [id number] [place] [address]                                Diabetes E11.9

 

                    [facility] [address] [address]                                 

 

                    [facility] [address] [address] 14 

May, 2018                                

 

                    [facility] [address] [address]                                 

 

                    [facility] [address] [address]                                 

 

                    [facility] [address] [address] 20 

Dec, 2017                                

 

                    [facility] [address] [address] 05 

Dec, 2017                                

 

                    [facility] [address] [address]                                 

 

                    [facility] [address] [address]                                 

 

                    [facility] [address] [address]                                Diabetes E11.9

 

                    [facility] [address] [address] 24 

Aug, 2017                               Type 1 diabetes mellitus with other neur

ologic complication E10.49

 

                    [facility] [address] [address] 24 

Aug, 2017                                

 

                    [facility] [address] [address] 04 

Aug, 2017                               Diabetes E11.9 ; Erectile dysfunction du

e to diseases classified 

elsewhere N52.1 ; Diabetic polyneuropathy associated with type 1 diabetes 
mellitus E10.42 and Cigarette nicotine dependence without complication F17.210

 

                    [facility] [id number] [place] [address]                                Hip fracture, left, closed, with routine

 healing, subsequent encounter

S72.002D and Allergy, initial encounter T78.40XA

 

                    [facility] [address] [facility] [place] [address]                                 

 

                    [facility] [id number] [place] [address] 30 

May, 2017                               Femur fracture, left S72.92XA

 

                    [facility] [id number] [place] [address] 30 

May, 2017                               Femur fracture, left S72.92XA

 

                    [facility] [id number] [place] [address] 16 

May, 2017                               Diabetes E11.9 and Femur fracture, left 

S72.92XA

 

                    [facility] [id number]

[place] [id number] 

09 May, 2017                             

 

                          [facility]  [address] [facility] [id number]

[address] [place] 

[address]                31 May, 2016               

 

                    [facility] [id number] [place] [address] 31 

May, 2016                                

 

                    [facility] [id number] [place] [address]                                 

 

                    [facility] [id number] [place] [address]                                 

 

                    [facility] [id number] [place] [address] 24 

Mar, 2015                                

 

                    [facility] [id number] [place] [address] 24 

Mar, 2015                                

 

                    [facility] [id number] [place] [address] 04 

Mar, 2015                                

 

                    [facility] [id number] [place] [address] 04 

Mar, 2015                                

 

                    [facility] [id number] [place] [address] 04 

Mar, 2015                                

 

                    [facility] [id number] [place] [address] 04 

Mar, 2015                                

 

                    [facility] [address] [facility] [place] [address] 30 

Dec, 2014                                

 

                    [facility] [address] [facility] [id number] [place] [address] 30 

Dec, 2014                                

 

                    [facility] [address] [facility] [place] [address] 18 

Dec, 2014                                

 

                    [facility] [address] [facility] [place] [address] 18 

Dec, 2014                                

 

                    [facility] [address] [facility] [place] [address] 15 

Dec, 2014                                

 

                    [facility] [address] [facility] [place] [address] 12 

Dec, 2014                                

 

                    [facility] [address] [facility] [place] [address] 12 

Dec, 2014                                

 

                    [facility] [address] [facility] [place] [address] 24 

Oct, 2014                                

 

                    [facility] [address] [facility] [place] [address] 24 

Oct, 2014                                

 

                    [facility] [address] [facility] [place] [address] 21 

Oct, 2014                                

 

                    [facility] [address] [facility] [place] [address] 21 

Oct, 2014                                

 

                    [facility] [address] [facility] [place] [address] 16 

Sep, 2014                                

 

                    [facility] [address] [facility] [place] [address] 16 

Sep, 2014                                

 

                    [facility] [address] [facility] [place] [address]                                 

 

                    [facility] [address] [facility] [place] [address]                                 

 

                    [facility] [address] [facility] [place] [address] 17 

2014                                

 

                    [facility] [address] [facility] [place] [address] 17 

2014                                

 

                    [facility] [address] [facility] [place] [address] 10 

[place], 2014                                

 

                    [facility] [address] [facility] [place] [address] 10 

[place], 2014                                

 

                    [facility] [address] [facility] [place] [address]                                 

 

                    [facility] [address] [facility] [place] [address]                                 

 

                    [facility] [address] [facility] [place] [address] 15 

2013                                

 

                    [facility] [address] [facility] [place] [address] 14 

2013                                

 

                    [facility] [address] [facility] [place] [address]                                 

 

                    [facility] [address] [facility] [place] [address]                                 

 

                    [facility] [address] [facility] [place] [address]                                 

 

                    [facility] [address] [facility] [place] [address]                                 

 

                    [facility] [address] [facility] [place] [address]                                 

 

                    [facility] [address] [facility] [place] [address] 29 

Oct, 2013                                

 

                    [facility] [address] [facility] [place] [address] 29 

Oct, 2013                                

 

                    [facility] [address] [facility] [place] [address] 13 

Aug, 2013                                

 

                    [facility] [address] [facility] [place] [address] 23 

May, 2013                                

 

                    [facility] [address] [facility] [place] [address]                                 

 

                    [facility] [address] [facility] [place] [address]                                 

 

                    [facility] [address] [facility] [place] [address] 12 

Mar, 2013                                

 

                    [facility] [address] [facility] [place] [address] 05 

Mar, 2013                                

 

                    [facility] [address] [facility] [place] [address]                                 

 

                    [facility] [id number] [place] [address]                                 

 

                    [facility] [address] [facility] [place] [address]                                 

 

                    [facility] [address] [facility] [place] [address] 18 

Dec, 2009                                

 

                    [facility] [address] [facility] [place] [address]                                 

 

                    [facility] [address] [facility] [place] [address] 26 

Oct, 2009                                

 

                    [facility] [address] [facility] [place] [address] 23 

Oct, 2009                                

 

                    [facility] [address] [facility] [place] [address] 14 

Sep, 2009                                







IMMUNIZATIONS

No Known Immunizations



SOCIAL HISTORY

Never Assessed



REASON FOR VISIT





PLAN OF CARE





VITAL SIGNS





MEDICATIONS

Unknown Medications



RESULTS

No Results



PROCEDURES

No Known procedures



INSTRUCTIONS





MEDICATIONS ADMINISTERED

No Known Medications



MEDICAL (GENERAL) HISTORY





                    Type                Description         Date

 

                    Medical History     Diabetes             

 

                    Surgical History    Left hip surgery    2017

 

                    Surgical History    tonsilectomy         

 

                    Hospitalization History  [facility]

7

## 2020-04-28 NOTE — XMS REPORT
[facility]

                             Created on: 2020



Dat Ruiz

External Reference #: [id number]

: 1990

Sex: Male



Demographics





                          Address                   [address] [place]  [address]

 

                          Preferred Language        Unknown

 

                          Marital Status            Unknown

 

                          Scientology Affiliation     Unknown

 

                          Race                      Unknown

 

                          Ethnic Group              Unknown





Author





                          Author                    Dat COSTELLO



 

                          Organization              [facility]

 

                          Address                   [address] [place]  [address]



 

                          Phone                     [phone]







Care Team Providers





                    Care Team Member Name Role                Phone

 

                    ALYSHA COSTELLO Unavailable         [phone]







PROBLEMS





          Type      Condition ICD9-CM Code VSC67-GK Code Onset Dates Condition S

tatus SNOMED 

Code

 

                Problem         Diabetic polyneuropathy associated with type 1 d

iabetes mellitus                 

E10.42                                  Active              [id number]

 

          Problem   Type 1 diabetes mellitus without complication           E10.

9               Active    [id number]

 

           Problem    Erectile dysfunction due to diseases classified elsewhere 

           N52.1                 

Active                                  [id number]







ALLERGIES

No Information



ENCOUNTERS





                Encounter       Location        Date            Diagnosis

 

                    [place] [address] [address]                                 

 

                    [facility] [address] [address]                                 

 

                    [place] [address] [address] 10 

Dec, 2019                                

 

                    [facility] [address] [address]                                 

 

                    [place] [address] [address] 08 

2019                               Type 1 diabetes mellitus without complic

ation E10.9 and Long term 

(current) use of insulin Z79.4

 

                    [facility] [address] [address]                                 

 

                    [place] [address] [address] 29 

Oct, 2019                                

 

                    [place] [address] [address] 15 

Oct, 2019                                

 

                    [facility] [address] [address] 10 

Oct, 2019                                

 

                    [facility] [address] [id number] [place] [address] 09 

Sep, 2019                                

 

                    [facility] [id number] [place] [address] 09 

Sep, 2019                                

 

                    [facility] [id number] [place] [address]                                Diabetes E11.9

 

                    [facility] [id number] [place] [address]                                 

 

                    [facility] [id number] [place] [address]                                Diabetes E11.9

 

                    [facility] [id number] [place] [address] 06 

May, 2019                                

 

                    [facility] [id number] [place] [address] 05 

Mar, 2019                                

 

                    [facility] [id number] [place] [address]                                 

 

                    [facility] [id number] [place] [address]                                 

 

                    [facility] [id number] [place] [address] 28 

Dec, 2018                               Diabetes E11.9

 

                    [facility] [id number] [place] [address] 25 

Oct, 2018                                

 

                    [facility] [id number] [place] [address] 22 

Oct, 2018                                

 

                    [facility] [address] [facility] [id number] [place] [address] 19 

Oct, 2018                               Diabetic polyneuropathy associated with 

type 1 diabetes mellitus 

E10.42

 

                    [facility] [id number] [place] [address] 15 

Oct, 2018                               Diabetes E11.9

 

                    [facility] [id number] [place] [address]                                 

 

                    [facility] [address] [id number] [place] [address]                                Diabetes E11.9

 

                    [facility] [address] [address]                                 

 

                    [facility] [address] [address] 14 

May, 2018                                

 

                    [facility] [address] [address]                                 

 

                    [facility] [address] [address]                                 

 

                    [facility] [address] [address] 20 

Dec, 2017                                

 

                    [facility] [address] [address] 05 

Dec, 2017                                

 

                    [facility] [address] [address]                                 

 

                    [facility] [address] [address]                                 

 

                    [facility] [address] [address]                                Diabetes E11.9

 

                    [facility] [address] [address] 24 

Aug, 2017                               Type 1 diabetes mellitus with other neur

ologic complication E10.49

 

                    [facility] [address] [address] 24 

Aug, 2017                                

 

                    [facility] [address] [address] 04 

Aug, 2017                               Diabetes E11.9 ; Erectile dysfunction du

e to diseases classified 

elsewhere N52.1 ; Diabetic polyneuropathy associated with type 1 diabetes 
mellitus E10.42 and Cigarette nicotine dependence without complication F17.210

 

                    [facility] [address] [id number] [place] [address]                                Hip fracture, left, closed, with routine

 healing, subsequent encounter

S72.002D and Allergy, initial encounter T78.40XA

 

                    [facility] [id number] [place] [address]                                 

 

                    [facility] [id number] [place] [address] 30 

May, 2017                               Femur fracture, left S72.92XA

 

                    [facility] [id number] [place] [address] 30 

May, 2017                               Femur fracture, left S72.92XA

 

                    [facility] [id number] [place] [address] 16 

May, 2017                               Diabetes E11.9 and Femur fracture, left 

S72.92XA

 

                    [facility] [id number]

[place] [id number] 

09 May, 2017                             

 

                          [facility]  [address] [facility] [id number]

[address] [place] 

[address]                31 May, 2016               

 

                    [facility] [id number] [place] [address] 31 

May, 2016                                

 

                    [facility] [id number] [place] [address]                                 

 

                    [facility] [id number] [place] [address]                                 

 

                    [facility] [id number] [place] [address] 24 

Mar, 2015                                

 

                    [facility] [id number] [place] [address] 24 

Mar, 2015                                

 

                    [facility] [id number] [place] [address] 04 

Mar, 2015                                

 

                    [facility] [id number] [place] [address] 04 

Mar, 2015                                

 

                    [facility] [id number] [place] [address] 04 

Mar, 2015                                

 

                    [facility] [id number] [place] [address] 04 

Mar, 2015                                

 

                    [facility] [id number] [place] [address] 30 

Dec, 2014                                

 

                    [facility] [address] [facility] [id number] [place] [address] 30 

Dec, 2014                                

 

                    [facility] [address] [id number] [place] [address] 18 

Dec, 2014                                

 

                    [facility] [id number] [place] [address] 18 

Dec, 2014                                

 

                    [facility] [id number] [place] [address] 15 

Dec, 2014                                

 

                    [facility] [id number] [place] [address] 12 

Dec, 2014                                

 

                    [facility] [id number] [place] [address] 12 

Dec, 2014                                

 

                    [facility] [id number] [place] [address] 24 

Oct, 2014                                

 

                    [facility] [id number] [place] [address] 24 

Oct, 2014                                

 

                    [facility] [id number] [place] [address] 21 

Oct, 2014                                

 

                    [facility] [id number] [place] [address] 21 

Oct, 2014                                

 

                    [facility] [id number] [place] [address] 16 

Sep, 2014                                

 

                    [facility] [id number] [place] [address] 16 

Sep, 2014                                

 

                    [facility] [id number] [place] [address]                                 

 

                    [facility] [id number] [place] [address]                                 

 

                    [facility] [id number] [place] [address] 17 

2014                                

 

                    [facility] [id number] [place] [address] 17 

2014                                

 

                    [facility] [id number] [place] [address] 10 

[place], 2014                                

 

                    [facility] [id number] [place] [address] 10 

[place], 2014                                

 

                    [facility] [id number] [place] [address]                                 

 

                    [facility] [id number] [place] [address]                                 

 

                    [facility] [id number] [place] [address] 15 

2013                                

 

                    [facility] [id number] [place] [address] 14 

2013                                

 

                    [facility] [id number] [place] [address]                                 

 

                    [facility] [id number] [place] [address]                                 

 

                    [facility] [id number] [place] [address]                                 

 

                    [facility] [id number] [place] [address]                                 

 

                    [facility] [id number] [place] [address]                                 

 

                    [facility] [id number] [place] [address] 29 

Oct, 2013                                

 

                    [facility] [id number] [place] [address] 29 

Oct, 2013                                

 

                    [facility] [id number] [place] [address] 13 

Aug, 2013                                

 

                    [facility] [id number] [place] [address] 23 

May, 2013                                

 

                    [facility] [id number] [place] [address]                                 

 

                    [facility] [id number] [place] [address]                                 

 

                    [facility] [id number] [place] [address] 12 

Mar, 2013                                

 

                    [facility] [id number] [place] [address] 05 

Mar, 2013                                

 

                    [facility] [address] [facility] [id number] [place] [address]                                 

 

                    [facility] [id number] [place] [address]                                 

 

                    [facility] [address] [facility] [id number] [place] [address]                                 

 

                    [facility] [id number] [place] [address] 18 

Dec, 2009                                

 

                    [facility] [address] [facility] [id number] [place] [address]                                 

 

                    [facility] [address] [id number] [place] [address] 26 

Oct, 2009                                

 

                    [facility] [address] [id number] [place] [address] 23 

Oct, 2009                                

 

                    [facility] [id number] [place] [address] 14 

Sep, 2009                                







IMMUNIZATIONS

No Known Immunizations



SOCIAL HISTORY

Never Assessed



REASON FOR VISIT





PLAN OF CARE





VITAL SIGNS





MEDICATIONS

Unknown Medications



RESULTS

No Results



PROCEDURES

No Known procedures



INSTRUCTIONS





MEDICATIONS ADMINISTERED

No Known Medications



MEDICAL (GENERAL) HISTORY





                    Type                Description         Date

 

                    Medical History     Diabetes             

 

                    Surgical History    Left hip surgery    2017

 

                    Surgical History    tonsilectomy         

 

                    Hospitalization History  [facility]

7

## 2020-04-28 NOTE — XMS REPORT
[facility]

                             Created on: 2020



Dat Ruiz

External Reference #: [id number]

: 1990

Sex: Male



Demographics





                          Address                   [address] [place]  [address]

 

                          Preferred Language        Unknown

 

                          Marital Status            Unknown

 

                          M[employer]on Affiliation     Unknown

 

                          Race                      Unknown

 

                          Ethnic Group              Unknown





Author





                          Author                    Dat Mcclellan Doctor

 

                          Organization              [facility]

 

                          Address                   Unknown

 

                          Phone                     Unavailable







Care Team Providers





                    Care Team Member Name Role                Phone

 

                    Migration,  Doctor  Unavailable         Unavailable







PROBLEMS





          Type      Condition ICD9-CM Code ZBB67-YI Code Onset Dates Condition S

tatus SNOMED 

Code

 

                Problem         Diabetic polyneuropathy associated with type 1 d

iabetes mellitus                 

E10.42                                  Active              08140408

 

          Problem   Type 1 diabetes mellitus without complication           E10.

9               Active    [id number]

 

           Problem    Erectile dysfunction due to diseases classified elsewhere 

           N52.1                 

Active                                  [id number]







ALLERGIES

No Information



ENCOUNTERS





                Encounter       Location        Date            Diagnosis

 

                    [place] [address] [address]                                 

 

                    [place] [address] [address]                                 

 

                    [place] [address] [address] 10 

Dec, 2019                                

 

                    [place] [address] [address]                                 

 

                    [place] [address] [address]                                Type 1 diabetes mellitus without complic

ation E10.9 and Long term 

(current) use of insulin Z79.4

 

                    [place] [address] [address]                                 

 

                    [facility] [address] [address] 29 

Oct, 2019                                

 

                    [place] [address] [address] 15 

Oct, 2019                                

 

                    [place] [address] [address] 10 

Oct, 2019                                

 

                    [facility] [address] [address] 09 

Sep, 2019                                

 

                    [facility] [id number] [place] [address] 09 

Sep, 2019                                

 

                    [facility] [id number] [place] [address]                                Diabetes E11.9

 

                    [facility] [id number] [place] [address]                                 

 

                    [facility] [id number] [place] [address]                                Diabetes E11.9

 

                    [facility] [id number] [place] [address] 06 

May, 2019                                

 

                    [facility] [id number] [place] [address] 05 

Mar, 2019                                

 

                    [facility] [id number] [place] [address]                                 

 

                    [facility] [id number] [place] [address]                                 

 

                    [facility] [id number] [place] [address] 28 

Dec, 2018                               Diabetes E11.9

 

                    [facility] [id number] [place] [address] 25 

Oct, 2018                                

 

                    [facility] [id number] [place] [address] 22 

Oct, 2018                                

 

                    [facility] [id number] [place] [address] 19 

Oct, 2018                               Diabetic polyneuropathy associated with 

type 1 diabetes mellitus 

E10.42

 

                    [facility] [id number] [place] [address] 15 

Oct, 2018                               Diabetes E11.9

 

                    [facility] [id number] [place] [address]                                 

 

                    [facility] [id number] [place] [address]                                Diabetes E11.9

 

                    [facility] [address] [address]                                 

 

                    [facility] [address] [address] 14 

May, 2018                                

 

                    [facility] [address] [address]                                 

 

                    [facility] [address] [address]                                 

 

                    [facility] [address] [address] 20 

Dec, 2017                                

 

                    [place] [address] [address] 05 

Dec, 2017                                

 

                    [place] [address] [address]                                 

 

                    [place] [address] [address]                                 

 

                    [place] [address] [address]                                Diabetes E11.9

 

                    [place] [address] [address] 24 

Aug, 2017                               Type 1 diabetes mellitus with other neur

ologic complication E10.49

 

                    [place] [address] [address] 24 

Aug, 2017                                

 

                    [place] [address] [address] 04 

Aug, 2017                               Diabetes E11.9 ; Erectile dysfunction du

e to diseases classified 

elsewhere N52.1 ; Diabetic polyneuropathy associated with type 1 diabetes 
mellitus E10.42 and Cigarette nicotine dependence without complication F17.210

 

                    [place] [address] [address]                                Hip fracture, left, closed, with routine

 healing, subsequent encounter

S72.002D and Allergy, initial encounter T78.40XA

 

                    [place] [address] [address]                                 

 

                    [facility] [address] [facility] [place] [address] 30 

May, 2017                               Femur fracture, left S72.92XA

 

                    [facility] [address] [facility] [place] [address] 30 

May, 2017                               Femur fracture, left S72.92XA

 

                    [facility] [address] [facility] [place] [address] 16 

May, 2017                               Diabetes E11.9 and Femur fracture, left 

S72.92XA

 

                    [facility] [id number]

[place] [id number] 

09 May, 2017                             

 

                          [facility]  [address] [facility] [id number]

[address] [place] 

[address]                31 May, 2016               

 

                    [facility] [address] [facility] [place] [address] 31 

May, 2016                                

 

                    [facility] [address] [facility] [place] [address]                                 

 

                    [facility] [address] [facility] [place] [address]                                 

 

                    [facility] [address] [facility] [place] [address] 24 

Mar, 2015                                

 

                    [facility] [address] [facility] [place] [address] 24 

Mar, 2015                                

 

                    [facility] [address] [facility] [place] [address] 04 

Mar, 2015                                

 

                    [facility] [address] [facility] [place] [address] 04 

Mar, 2015                                

 

                    [facility] [address] [facility] [place] [address] 04 

Mar, 2015                                

 

                    [facility] [address] [facility] [place] [address] 04 

Mar, 2015                                

 

                    [facility] [address] [facility] [place] [address] 30 

Dec, 2014                                

 

                    [facility] [address] [facility] [place] [address] 30 

Dec, 2014                                

 

                    [facility] [address] [facility] [place] [address] 18 

Dec, 2014                                

 

                    [facility] [address] [facility] [place] [address] 18 

Dec, 2014                                

 

                    [facility] [address] [facility] [place] [address] 15 

Dec, 2014                                

 

                    [facility] [address] [facility] [place] [address] 12 

Dec, 2014                                

 

                    [facility] [address] [facility] [place] [address] 12 

Dec, 2014                                

 

                    [facility] [address] [facility] [place] [address] 24 

Oct, 2014                                

 

                    [facility] [address] [facility] [place] [address] 24 

Oct, 2014                                

 

                    [facility] [address] [facility] [place] [address] 21 

Oct, 2014                                

 

                    [facility] [address] [facility] [place] [address] 21 

Oct, 2014                                

 

                    [facility] [address] [facility] [place] [address] 16 

Sep, 2014                                

 

                    [facility] [address] [facility] [place] [address] 16 

Sep, 2014                                

 

                    [facility] [address] [facility] [place] [address]                                 

 

                    [facility] [address] [facility] [place] [address] 20 

2014                                

 

                    [facility] [address] [facility] [place] [address] 17 

2014                                

 

                    [facility] [address] [facility] [place] [address] 17 

2014                                

 

                    [facility] [address] [facility] [place] [address] 10 

[place], 2014                                

 

                    [facility] [address] [facility] [place] [address] 10 

[place], 2014                                

 

                    [facility] [address] [facility] [place] [address]                                 

 

                    [facility] [address] [facility] [place] [address]                                 

 

                    [facility] [address] [facility] [place] [address] 15 

Nov, 2013                                

 

                    [facility] [address] [facility] [place] [address]                                 

 

                    [facility] [address] [facility] [place] [address]                                 

 

                    [facility] [address] [facility] [place] [address]                                 

 

                    [facility] [address] [facility] [place] [address]                                 

 

                    [facility] [address] [facility] [place] [address]                                 

 

                    [facility] [address] [facility] [place] [address]                                 

 

                    [facility] [address] [facility] [place] [address] 29 

Oct, 2013                                

 

                    [facility] [address] [facility] [place] [address] 29 

Oct, 2013                                

 

                    [facility] [address] [facility] [place] [address] 13 

Aug, 2013                                

 

                    [facility] [address] [facility] [place] [address] 23 

May, 2013                                

 

                    [facility] [address] [facility] [place] [address]                                 

 

                    [facility] [address] [facility] [place] [address]                                 

 

                    [facility] [address] [facility] [place] [address] 12 

Mar, 2013                                

 

                    [facility] [address] [facility] [place] [address] 05 

Mar, 2013                                

 

                    [facility] [address] [facility] [place] [address]                                 

 

                    [facility] [address] [facility] [place] [address]                                 

 

                    [facility] [id number] [place] [address]                                 

 

                    [facility] [id number] [place] [address] 18 

Dec, 2009                                

 

                    [facility] [id number] [place] [address]                                 

 

                    [facility] [address] [facility] [place] [address] 26 

Oct, 2009                                

 

                    [facility] [address] [facility] [place] [address] 23 

Oct, 2009                                

 

                    [facility] [address] [facility] [place] [address] 14 

Sep, 2009                                







IMMUNIZATIONS

No Known Immunizations



SOCIAL HISTORY

Never Assessed



REASON FOR VISIT





PLAN OF CARE





VITAL SIGNS





MEDICATIONS

Unknown Medications



RESULTS

No Results



PROCEDURES

No Known procedures



INSTRUCTIONS





MEDICATIONS ADMINISTERED

No Known Medications



MEDICAL (GENERAL) HISTORY





                    Type                Description         Date

 

                    Medical History     Diabetes             

 

                    Surgical History    Left hip surgery    

 

                    Surgical History    tonsilectomy         

 

                    Hospitalization History  [facility]

7

## 2020-04-28 NOTE — XMS REPORT
[facility]

                             Created on: 2020



Dat Ruiz

External Reference #: [id number]

: 1990

Sex: Male



Demographics





                          Address                   [address] [place]  [address]

 

                          Preferred Language        Unknown

 

                          Marital Status            Unknown

 

                          Yazidism Affiliation     Unknown

 

                          Race                      Unknown

 

                          Ethnic Group              Unknown





Author





                          Author                    Dat COSTELLO



 

                          Organization              [facility]

 

                          Address                   [address] [place]  [address]



 

                          Phone                     [phone]







Care Team Providers





                    Care Team Member Name Role                Phone

 

                    ALYSHA COSTELLO Unavailable         [phone]







PROBLEMS





          Type      Condition ICD9-CM Code HXY01-CT Code Onset Dates Condition S

tatus SNOMED 

Code

 

                Problem         Diabetic polyneuropathy associated with type 1 d

iabetes mellitus                 

E10.42                                  Active              [id number]

 

          Problem   Type 1 diabetes mellitus without complication           E10.

9               Active    [id number]

 

           Problem    Erectile dysfunction due to diseases classified elsewhere 

           N52.1                 

Active                                  [id number]







ALLERGIES

No Information



ENCOUNTERS





                Encounter       Location        Date            Diagnosis

 

                          [facility]     [address] [id number]

[address] [place] [address]

                                         

 

                          [facility]     [address] [facility] [id number]

[address] [place] [address]

                                         

 

                          [facility]     [address] [id number]

[address] [place] [address]

                          10 Dec, 2019               

 

                          [facility]     [address] [id number]

[address] [place] [address]

                                         

 

                          [facility]     [address] [id number]

[address] [place] [address]

                                        Type 1 diabetes mellitus wit

hout complication E10.9 and Long term 

(current) use of insulin Z79.4

 

                          [facility]     [address] [facility] [id number]

[address] [place] [address]

                                         

 

                          [facility]     [address] [id number]

[address] [place] [address]

                          29 Oct, 2019               

 

                          [facility]     [address] [id number]

[address] [place] [address]

                          15 Oct, 2019               

 

                          [facility]     [address] [id number]

[address] [place] [address]

                          10 Oct, 2019               

 

                          [facility]     [address] [id number]

[address] [place] [address], [place] [address]

                          09 Sep, 2019               

 

                          [facility]     [address] [id number]

[address] [place] [address]

                          09 Sep, 2019               

 

                          [facility]     [address] [id number]

[address] [place] [address]

                                        Diabetes E11.9

 

                          [facility]     [address] [id number]

[address] [place] [address]

                                         

 

                          [facility]     [address] [id number]

[address] [place] [address]

                                        Diabetes E11.9

 

                          [facility]     [address] [id number]

[address] [place] [address]

                          06 May, 2019               

 

                          [facility]     [address] [id number]

[address] [place] [address]

                          05 Mar, 2019               

 

                          [facility]     [address] [id number]

[address] [place] [address]

                                         

 

                          [facility]     [address] [id number]

[address] [place] [address]

                                         

 

                          [facility]     [address] [id number]

[address] [place] [address]

                          28 Dec, 2018              Diabetes E11.9

 

                          [facility]     [address] [id number]

[address] [place] [address]

                          25 Oct, 2018               

 

                          [facility]     [address] [id number]

[address] [place] [address]

                          22 Oct, 2018               

 

                          [facility]     [address] [id number]

[address] [place] [address]

                          19 Oct, 2018              Diabetic polyneuropathy asso

ciated with type 1 diabetes mellitus 

E10.42

 

                          [facility]     [address] [id number]

[address] [place] [address]

                          15 Oct, 2018              Diabetes E11.9

 

                          [facility]     [address] [id number]

[address] [place] [address]

                                         

 

                          [facility]     [address] [id number]

[address] [place] [address]

                                        Diabetes E11.9

 

                          [facility]     [address] [facility] [id number]

[address] [place] [address]

                                         

 

                          [facility]     [address] [id number]

[address] [place] [address]

                          14 May, 2018               

 

                          [facility]     [address] [id number]

[address] [place] [address]

                                         

 

                          [facility]     [address] [id number]

[address] [place] [address]

                                         

 

                          [facility]     [address] [facility] [id number]

[address] [place] [address]

                          20 Dec, 2017               

 

                          [facility]     [address] [id number]

[address] [place] [address]

                          05 Dec, 2017               

 

                          [facility]     [address] [facility] [id number]

[address] [place] [address]

                                         

 

                          [facility]     [address] [facility] [id number]

[address] [place] [address]

                                         

 

                          [facility]     [address] [facility] [id number]

[address] [place] [address]

                                        Diabetes E11.9

 

                          [facility]     [address] [id number]

[address] [place] [address]

                          24 Aug, 2017              Type 1 diabetes mellitus wit

h other neurologic complication E10.49

 

                          [facility]     [address] [id number]

[address] [place] [address]

                          24 Aug, 2017               

 

                          [facility]     [address] [facility] [id number]

[address] [place] [address]

                          04 Aug, 2017              Diabetes E11.9 ; Erectile dy

sfunction due to diseases classified 

elsewhere N52.1 ; Diabetic polyneuropathy associated with type 1 diabetes 
mellitus E10.42 and Cigarette nicotine dependence without complication F17.210

 

                          [facility]     [address] [facility] [id number]

[address] [place] [address]

                                        Hip fracture, left, closed, 

with routine healing, subsequent 

encounter S72.002D and Allergy, initial encounter T78.40XA

 

                          [facility]     [address] [facility] [id number]

[address] [place] [address]

                                         

 

                          [facility]     [address] [id number]

[address] [place] [address]

                          30 May, 2017              Femur fracture, left S72.92X

A

 

                          [address] [address]

                          30 May, 2017              Femur fracture, left S72.92X

A

 

                          [facility]     [address] [address]

                          16 May, 2017              Diabetes E11.9 and Femur fra

cture, left S72.92XA

 

                    [facility] [id number] [address]

[place] [id number] 

09 May, 2017                             

 

                          [facility]  [id number] [place] [address] 

[address]                31 May, 2016               

 

                          [facility]     [address] [facility] [id number]

[address] [place] [address]

                          31 May, 2016               

 

                          [facility]     [address] [facility] [id number]

[address] [place] [address]

                                         

 

                          [facility]     [address] [facility] [id number]

[address] [place] [address]

                                         

 

                          [facility]     [address] [address]

                          24 Mar, 2015               

 

                          [facility]     [address] [facility] [id number]

[address] [place] [address]

                          24 Mar, 2015               

 

                          [facility]     [address] [id number]

[address] [place] [address]

                          04 Mar, 2015               

 

                          [facility]     [address] [id number]

[address] [place] [address], [place] [address]

                          04 Mar,                

 

                          [facility]     [address] [id number]

[address] [place] [address], [place] [address]

                          04 Mar, 2015               

 

                          [facility]     [address] [id number]

[address] [place] [address], [place] [address]

                          04 Mar, 2015               

 

                          [facility]     [address] [id number]

[address] [place] [address], [place] [address]

                          30 Dec, 2014               

 

                          [facility]     [address] [id number]

[address] [place] [address], [place] [address]

                          30 Dec, 2014               

 

                          [facility]     [address] [id number]

[address] [place] [address], [place] [address]

                          18 Dec, 2014               

 

                          [facility]     [address] [id number]

[address] [place] [address], [place] [address]

                          18 Dec, 2014               

 

                          [facility]     [address] [id number]

[address] [place] [address], [place] [address]

                          15 Dec, 2014               

 

                          [facility]     [address] [id number]

[address] [place] [address], [place] [address]

                          12 Dec, 2014               

 

                          [facility]     [address] [id number]

[address] [place] [address], [place] [address]

                          12 Dec, 2014               

 

                          [facility]     [address] [id number]

[address] [place] [address], [place] [address]

                          24 Oct, 2014               

 

                          [facility]     [address] [id number]

[address] [place] [address], [place] [address]

                          24 Oct, 2014               

 

                          [facility]     [address] [id number]

[address] [place] [address], [place] [address]

                          21 Oct, 2014               

 

                          [facility]     [address] [id number]

[address] [place] [address], [place] [address]

                          21 Oct, 2014               

 

                          [facility]     [address] [id number]

[address] [place] [address], [place] [address]

                          16 Sep, 2014               

 

                          [facility]     [address] [id number]

[address] [place] [address]

                          16 Sep, 2014               

 

                          [facility]     [address] [id number]

[address] [place] [address], [place] [address]

                          20 2014               

 

                          [facility]     [address] [id number]

[address] [place] [address], [place] [address]

                          20 2014               

 

                          [facility]     [address] [id number]

[address] [place] [address], [place] [address]

                          17 2014               

 

                          [facility]     [address] [id number]

[address] [place] [address], [place] [address]

                          17 2014               

 

                          [facility]     [address] [id number]

[address] [place] [address], [place] [address]

                          10 2014               

 

                          [facility]     [address] [id number]

[address] [place] [address], [place] [address]

                          10 Wellington, 2014               

 

                          [facility]     [address] [id number]

[address] [place] [address], [place] [address]

                                         

 

                          [facility]     [address] [id number]

[address] [place] [address], [place] [address]

                                         

 

                          [facility]     [address] [id number]

[address] [place] [address], [place] [address]

                          15 2013               

 

                          [facility]     [address] [id number]

[address] [place] [address], [place] [address]

                          14 2013               

 

                          [facility]     [address] [id number]

[address] [place] [address], [place] [address]

                          14 2013               

 

                          [facility]     [address] [id number]

[address] [place] [address], [place] [address]

                          12 2013               

 

                          [facility]     [address] [id number]

[address] [place] [address], [place] [address]

                          12 2013               

 

                          [facility]     [address] [id number]

[address] [place] [address], [place] [address]

                          05 2013               

 

                          [facility]     [address] [id number]

[address] [place] [address], [place] [address]

                          05 2013               

 

                          [facility]     [address] [id number]

[address] [place] [address], [place] [address]

                          29 Oct, 2013               

 

                          [facility]     [address] [id number]

[address] [place] [address], [place] [address]

                          29 Oct, 2013               

 

                          [facility]     [address] [id number]

[address] [place] [address], [place] [address]

                          13 Aug, 2013               

 

                          [facility]     [address] [id number]

[address] [place] [address], [place] [address]

                          23 May, 2013               

 

                          [facility]     [address] [id number]

[address] [place] [address], [place] [address]

                                         

 

                          [facility]     [address] [id number]

[address] [place] [address], [place] [address]

                                         

 

                          [facility]     [address] [id number]

[address] [place] [address], [place] [address]

                          12 Mar, 2013               

 

                          [facility]     [address] [id number]

[address] [place] [address], [place] [address]

                          05 Mar, 2013               

 

                          [facility]     [address] [id number]

[address] [place] [address], [place] [address]

                                         

 

                          [facility]     [address] [id number]

[address] [place] [address], [place] [address]

                                         

 

                          [facility]     [address] [id number]

[address] [place] [address], [place] [address]

                                         

 

                          [facility]     [address] [id number]

[address] [place] [address], [place] [address]

                          18 Dec, 2009               

 

                          [facility]     [address] [id number]

[address] [place] [address], [place] [address]

                                         

 

                          [facility]     [address] [id number]

[address] [place] [address], [place] [address]

                          26 Oct, 2009               

 

                          [facility]     [address] [id number]

[address] [place] [address], [place] [address]

                          23 Oct, 2009               

 

                          [facility]     [address] [id number]

[address] [place] [address], [place] [address]

                          14 Sep, 2009               







IMMUNIZATIONS

No Known Immunizations



SOCIAL HISTORY

Never Assessed



REASON FOR VISIT





PLAN OF CARE





VITAL SIGNS





MEDICATIONS

Unknown Medications



RESULTS

No Results



PROCEDURES

No Known procedures



INSTRUCTIONS





MEDICATIONS ADMINISTERED

No Known Medications



MEDICAL (GENERAL) HISTORY





                    Type                Description         Date

 

                    Medical History     Diabetes             

 

                    Surgical History    Left hip surgery    

 

                    Surgical History    tonsilectomy         

 

                    Hospitalization History  [facility]

7

## 2020-04-28 NOTE — XMS REPORT
[facility]

                             Created on: 01/10/2020



Dat Ruiz

External Reference #: [id number]

: 1990

Sex: Male



Demographics





                          Address                   [address] [place]  [address]

 

                          Preferred Language        Unknown

 

                          Marital Status            Unknown

 

                          Anabaptism Affiliation     Unknown

 

                          Race                      Unknown

 

                          Ethnic Group              Unknown





Author





                          Author                    Dat MENDES

 

                          Organization              [facility]

 

                          Address                   [address] [place]  [address]



 

                          Phone                     [phone]







Care Team Providers





                    Care Team Member Name Role                Phone

 

                    VERONICA MENDES     Unavailable         [phone]







PROBLEMS





          Type      Condition ICD9-CM Code WIC23-IU Code Onset Dates Condition S

tatus SNOMED 

Code

 

                Problem         Diabetic polyneuropathy associated with type 1 d

iabetes mellitus                 

E10.42                                  Active              [id number]

 

          Problem   Type 1 diabetes mellitus without complication           E10.

9               Active    [id number]

 

           Problem    Erectile dysfunction due to diseases classified elsewhere 

           N52.1                 

Active                                  [id number]







ALLERGIES

No Information



ENCOUNTERS





                Encounter       Location        Date            Diagnosis

 

                    [place] [address] [address]                                 

 

                    [facility] [address] [address]                                 

 

                    [facility] [address] [address] 10 

Dec, 2019                                

 

                    [facility] [address] [address]                                 

 

                    [facility] [address] [address]                                Type 1 diabetes mellitus without complic

ation E10.9 and Long term 

(current) use of insulin Z79.4

 

                    [facility] [address] [address]                                 

 

                    [facility] [address] [address] 29 

Oct, 2019                                

 

                    [facility] [address] [address] 15 

Oct, 2019                                

 

                    [facility] [address] [address] 10 

Oct, 2019                                

 

                    [facility] [address] [facility] [place] [address] 09 

Sep, 2019                                

 

                    [facility] [address] [place] [id number] [place] [address] 09 

Sep, 2019                                

 

                    [facility] [address] [facility] [place] [address]                                Diabetes E11.9

 

                    [facility] [address] [place] [id number] [place] [address]                                 

 

                    [facility] [address] [facility] [place] [address]                                Diabetes E11.9

 

                    [facility] [address] [place] [id number] [place] [address] 06 

May, 2019                                

 

                    [facility] [address] [place] [id number] [place] [address] 05 

Mar, 2019                                

 

                    [facility] [address] [place] [id number] [place] [address]                                 

 

                    [facility] [address] [place] [id number] [place] [address]                                 

 

                    [facility] [address] [place] [id number] [place] [address] 28 

Dec, 2018                               Diabetes E11.9

 

                    [facility] [address] [facility] [place] [address] 25 

Oct, 2018                                

 

                    [facility] [address] [place] [id number] [place] [address] 22 

Oct, 2018                                

 

                    [facility] [address] [facility] [place] [address] 19 

Oct, 2018                               Diabetic polyneuropathy associated with 

type 1 diabetes mellitus 

E10.42

 

                    [facility] [address] [place] [id number] [place] [address] 15 

Oct, 2018                               Diabetes E11.9

 

                    [facility] [address] [place] [id number] [place] [address]                                 

 

                    [facility] [address] [address]                                Diabetes E11.9

 

                    [facility] [address] [address]                                 

 

                    [facility] [address] [address] 14 

May, 2018                                

 

                    [facility] [address] [address]                                 

 

                    [facility] [address] [address]                                 

 

                    [facility] [address] [address] 20 

Dec, 2017                                

 

                    [facility] [address] [address] 05 

Dec, 2017                                

 

                    [facility] [address] [address]                                 

 

                    [facility] [address] [address]                                 

 

                    [facility] [address] [address]                                Diabetes E11.9

 

                    [place] [address] [address] 24 

Aug, 2017                               Type 1 diabetes mellitus with other neur

ologic complication E10.49

 

                    [place] [address] [address] 24 

Aug, 2017                                

 

                    [facility] [address] [address] 04 

Aug, 2017                               Diabetes E11.9 ; Erectile dysfunction du

e to diseases classified 

elsewhere N52.1 ; Diabetic polyneuropathy associated with type 1 diabetes 
mellitus E10.42 and Cigarette nicotine dependence without complication F17.210

 

                    [place] [address] [address]                                Hip fracture, left, closed, with routine

 healing, subsequent encounter

S72.002D and Allergy, initial encounter T78.40XA

 

                    [place] [address] [facility] [place] [address]                                 

 

                    [facility] [address] [place] [id number] [place] [address] 30 

May, 2017                               Femur fracture, left S72.92XA

 

                    [facility] [address] [facility] [place] [address] 30 

May, 2017                               Femur fracture, left S72.92XA

 

                    [facility] [address] [place] [id number] [place] [address] 16 

May, 2017                               Diabetes E11.9 and Femur fracture, left 

S72.92XA

 

                    [facility] [id number] [place]

[place] [id number] 

09 May, 2017                             

 

                          [facility]  [address] [facility] [id number]

[address] [place] 

[address]                31 May, 2016               

 

                    [facility] [address] [place] [id number] [place] [address] 31 

May, 2016                                

 

                    [facility] [address] [place] [id number] [place] [address]                                 

 

                    [facility] [address] [place] [id number] [place] [address]                                 

 

                    [facility] [address] [place] [id number] [place] [address] 24 

Mar, 2015                                

 

                    [facility] [address] [place] [id number] [place] [address] 24 

Mar, 2015                                

 

                    [facility] [address] [place] [id number] [place] [address] 04 

Mar, 2015                                

 

                    [facility] [address] [facility] [place] [address] 04 

Mar, 2015                                

 

                    [facility] [address] [place] [id number] [place] [address] 04 

Mar, 2015                                

 

                    [facility] [address] [facility] [place] [address] 04 

Mar, 2015                                

 

                    [facility] [address] [place] [id number] [place] [address] 30 

Dec, 2014                                

 

                    [facility] [address] [facility] [id number] [facility] [place] [address] 30 

Dec, 2014                                

 

                    [facility] [address] [facility] [id number] [place] [address] 18 

Dec, 2014                                

 

                    [facility] [address] [facility] [place] [address] 18 

Dec, 2014                                

 

                    [facility] [address] [facility] [place] [address] 15 

Dec, 2014                                

 

                    [facility] [address] [facility] [id number] [facility] [place] [address] 12 

Dec, 2014                                

 

                    [facility] [address] [facility] [place] [address] 12 

Dec, 2014                                

 

                    [facility] [address] [facility] [place] [address] 24 

Oct, 2014                                

 

                    [facility] [address] [facility] [place] [address] 24 

Oct, 2014                                

 

                    [facility] [address] [facility] [place] [address] 21 

Oct, 2014                                

 

                    [facility] [address] [facility] [place] [address] 21 

Oct, 2014                                

 

                    [facility] [address] [facility] [place] [address] 16 

Sep, 2014                                

 

                    [facility] [address] [facility] [place] [address] 16 

Sep, 2014                                

 

                    [facility] [address] [facility] [place] [address]                                 

 

                    [facility] [address] [facility] [place] [address]                                 

 

                    [facility] [address] [facility] [place] [address] 17 

2014                                

 

                    [facility] [address] [facility] [place] [address] 17 

2014                                

 

                    [facility] [address] [facility] [place] [address] 10 

2014                                

 

                    [facility] [address] [facility] [place] [address] 10 

[place], 2014                                

 

                    [facility] [address] [facility] [place] [address]                                 

 

                    [facility] [address] [facility] [place] [address]                                 

 

                    [facility] [address] [facility] [place] [address] 15 

2013                                

 

                    [facility] [address] [facility] [place] [address] 14 

2013                                

 

                    [facility] [address] [facility] [place] [address]                                 

 

                    [facility] [address] [facility] [place] [address]                                 

 

                    [facility] [address] [facility] [place] [address]                                 

 

                    [facility] [address] [facility] [place] [address]                                 

 

                    [facility] [address] [facility] [place] [address]                                 

 

                    [facility] [address] [facility] [place] [address] 29 

Oct, 2013                                

 

                    [facility] [address] [facility] [place] [address] 29 

Oct, 2013                                

 

                    [facility] [address] [facility] [place] [address] 13 

Aug, 2013                                

 

                    [facility] [address] [facility] [place] [address] 23 

May, 2013                                

 

                    [facility] [address] [facility] [place] [address]                                 

 

                    [facility] [address] [facility] [place] [address]                                 

 

                    [facility] [address] [facility] [place] [address] 12 

Mar, 2013                                

 

                    [facility] [address] [facility] [place] [address] 05 

Mar, 2013                                

 

                    [facility] [address] [facility] [place] [address]                                 

 

                    [facility] [address] [place] [id number] [place] [address]                                 

 

                    [facility] [address] [facility] [place] [address]                                 

 

                    [facility] [address] [place] [id number] [place] [address] 18 

Dec, 2009                                

 

                    [facility] [address] [facility] [place] [address]                                 

 

                    [facility] [address] [facility] [place] [address] 26 

Oct, 2009                                

 

                    [facility] [address] [facility] [place] [address] 23 

Oct, 2009                                

 

                    [facility] [address] [facility] [place] [address] 14 

Sep, 2009                                







IMMUNIZATIONS

No Known Immunizations



SOCIAL HISTORY

Never Assessed



REASON FOR VISIT





PLAN OF CARE





VITAL SIGNS





MEDICATIONS

Unknown Medications



RESULTS

No Results



PROCEDURES

No Known procedures



INSTRUCTIONS





MEDICATIONS ADMINISTERED

No Known Medications



MEDICAL (GENERAL) HISTORY





                    Type                Description         Date

 

                    Medical History     Diabetes             

 

                    Surgical History    Left hip surgery    2017

 

                    Surgical History    tonsilectomy         

 

                    Hospitalization History  [facility]

7

## 2020-04-28 NOTE — XMS REPORT
[facility]

                             Created on: 2020



Dat Ruiz

External Reference #: [id number]

: 1990

Sex: Male



Demographics





                          Address                   [address] [place]  [address]

 

                          Preferred Language        Unknown

 

                          Marital Status            Unknown

 

                          Confucianism Affiliation     Unknown

 

                          Race                      Unknown

 

                          Ethnic Group              Unknown





Author





                          Author                    Dat COSTELLO



 

                          Organization              [facility]

 

                          Address                   [address] [place]  [address]



 

                          Phone                     [phone]







Care Team Providers





                    Care Team Member Name Role                Phone

 

                    ALYSHA COSTELLO Unavailable         [phone]







PROBLEMS





          Type      Condition ICD9-CM Code GYX14-JP Code Onset Dates Condition S

tatus SNOMED 

Code

 

                Problem         Diabetic polyneuropathy associated with type 1 d

iabetes mellitus                 

E10.42                                  Active              [id number]

 

          Problem   Type 1 diabetes mellitus without complication           E10.

9               Active    [id number]

 

           Problem    Erectile dysfunction due to diseases classified elsewhere 

           N52.1                 

Active                                  [id number]







ALLERGIES

No Information



ENCOUNTERS





                Encounter       Location        Date            Diagnosis

 

                          [facility]     [address] [id number]

[address] [place] [address]

                                         

 

                          [facility]     [address] [facility] [id number]

[address] [place] [address]

                                         

 

                          [facility]     [address] [id number]

[address] [place] [address]

                          10 Dec, 2019               

 

                          [facility]     [address] [id number]

[address] [place] [address]

                                         

 

                          [facility]     [address] [id number]

[address] [place] [address]

                                        Type 1 diabetes mellitus wit

hout complication E10.9 and Long term 

(current) use of insulin Z79.4

 

                          [facility]     [address] [facility] [id number]

[address] [place] [address]

                                         

 

                          [facility]     [address] [id number]

[address] [place] [address]

                          29 Oct, 2019               

 

                          [facility]     [address] [id number]

[address] [place] [address]

                          15 Oct, 2019               

 

                          [facility]     [address] [id number]

[address] [place] [address]

                          10 Oct, 2019               

 

                          [facility]     [address] [id number]

[address] [place] [address], [place] [address]

                          09 Sep, 2019               

 

                          [facility]     [address] [id number]

[address] [place] [address]

                          09 Sep, 2019               

 

                          [facility]     [address] [id number]

[address] [place] [address]

                                        Diabetes E11.9

 

                          [facility]     [address] [id number]

[address] [place] [address]

                                         

 

                          [facility]     [address] [id number]

[address] [place] [address]

                                        Diabetes E11.9

 

                          [facility]     [address] [id number]

[address] [place] [address]

                          06 May, 2019               

 

                          [facility]     [address] [id number]

[address] [place] [address]

                          05 Mar, 2019               

 

                          [facility]     [address] [id number]

[address] [place] [address]

                                         

 

                          [facility]     [address] [id number]

[address] [place] [address]

                                         

 

                          [facility]     [address] [id number]

[address] [place] [address]

                          28 Dec, 2018              Diabetes E11.9

 

                          [facility]     [address] [id number]

[address] [place] [address]

                          25 Oct, 2018               

 

                          [facility]     [address] [id number]

[address] [place] [address]

                          22 Oct, 2018               

 

                          [facility]     [address] [id number]

[address] [place] [address]

                          19 Oct, 2018              Diabetic polyneuropathy asso

ciated with type 1 diabetes mellitus 

E10.42

 

                          [facility]     [address] [id number]

[address] [place] [address]

                          15 Oct, 2018              Diabetes E11.9

 

                          [facility]     [address] [id number]

[address] [place] [address]

                                         

 

                          [facility]     [address] [id number]

[address] [place] [address]

                                        Diabetes E11.9

 

                          [facility]     [address] [facility] [id number]

[address] [place] [address]

                                         

 

                          [facility]     [address] [id number]

[address] [place] [address]

                          14 May, 2018               

 

                          [facility]     [address] [id number]

[address] [place] [address]

                                         

 

                          [facility]     [address] [id number]

[address] [place] [address]

                                         

 

                          [facility]     [address] [facility] [id number]

[address] [place] [address]

                          20 Dec, 2017               

 

                          [facility]     [address] [id number]

[address] [place] [address]

                          05 Dec, 2017               

 

                          [facility]     [address] [facility] [id number]

[address] [place] [address]

                                         

 

                          [facility]     [address] [facility] [id number]

[address] [place] [address]

                                         

 

                          [facility]     [address] [facility] [id number]

[address] [place] [address]

                                        Diabetes E11.9

 

                          [facility]     [address] [id number]

[address] [place] [address]

                          24 Aug, 2017              Type 1 diabetes mellitus wit

h other neurologic complication E10.49

 

                          [facility]     [address] [id number]

[address] [place] [address]

                          24 Aug, 2017               

 

                          [facility]     [address] [facility] [id number]

[address] [place] [address]

                          04 Aug, 2017              Diabetes E11.9 ; Erectile dy

sfunction due to diseases classified 

elsewhere N52.1 ; Diabetic polyneuropathy associated with type 1 diabetes 
mellitus E10.42 and Cigarette nicotine dependence without complication F17.210

 

                          [facility]     [address] [facility] [id number]

[address] [place] [address]

                                        Hip fracture, left, closed, 

with routine healing, subsequent 

encounter S72.002D and Allergy, initial encounter T78.40XA

 

                          [facility]     [address] [facility] [id number]

[address] [place] [address]

                                         

 

                          [facility]     [address] [id number]

[address] [place] [address]

                          30 May, 2017              Femur fracture, left S72.92X

A

 

                          [address] [address]

                          30 May, 2017              Femur fracture, left S72.92X

A

 

                          [facility]     [address] [address]

                          16 May, 2017              Diabetes E11.9 and Femur fra

cture, left S72.92XA

 

                    [facility] [address] [id number] PITT

[place] [id number] 

09 May, 2017                             

 

                          [facility]  [id number] [place] [address] 

[address]                31 May, 2016               

 

                          [facility]     [address] [facility] [id number]

[address] [place] [address]

                          31 May, 2016               

 

                          [facility]     [address] [facility] [id number]

[address] [place] [address]

                                         

 

                          [facility]     [address] [facility] [id number]

[address] [place] [address]

                                         

 

                          [facility]     [address] [address]

                          24 Mar, 2015               

 

                          [facility]     [address] [facility] [id number]

[address] [place] [address]

                          24 Mar, 2015               

 

                          [facility]     [address] [id number]

[address] [place] [address]

                          04 Mar, 2015               

 

                          [facility]     [address] [id number]

[address] [place] [address], [place] [address]

                          04 Mar,                

 

                          [facility]     [address] [id number]

[address] [place] [address], [place] [address]

                          04 Mar, 2015               

 

                          [facility]     [address] [id number]

[address] [place] [address], [place] [address]

                          04 Mar, 2015               

 

                          [facility]     [address] [id number]

[address] [place] [address], [place] [address]

                          30 Dec, 2014               

 

                          [facility]     [address] [id number]

[address] [place] [address], [place] [address]

                          30 Dec, 2014               

 

                          [facility]     [address] [id number]

[address] [place] [address], [place] [address]

                          18 Dec, 2014               

 

                          [facility]     [address] [id number]

[address] [place] [address], [place] [address]

                          18 Dec, 2014               

 

                          [facility]     [address] [id number]

[address] [place] [address], [place] [address]

                          15 Dec, 2014               

 

                          [facility]     [address] [id number]

[address] [place] [address], [place] [address]

                          12 Dec, 2014               

 

                          [facility]     [address] [id number]

[address] [place] [address], [place] [address]

                          12 Dec, 2014               

 

                          [facility]     [address] [id number]

[address] [place] [address], [place] [address]

                          24 Oct, 2014               

 

                          [facility]     [address] [id number]

[address] [place] [address], [place] [address]

                          24 Oct, 2014               

 

                          [facility]     [address] [id number]

[address] [place] [address], [place] [address]

                          21 Oct, 2014               

 

                          [facility]     [address] [id number]

[address] [place] [address], [place] [address]

                          21 Oct, 2014               

 

                          [facility]     [address] [id number]

[address] [place] [address], [place] [address]

                          16 Sep, 2014               

 

                          [facility]     [address] [id number]

[address] [place] [address]

                          16 Sep, 2014               

 

                          [facility]     [address] [id number]

[address] [place] [address], [place] [address]

                          20 2014               

 

                          [facility]     [address] [id number]

[address] [place] [address], [place] [address]

                          20 2014               

 

                          [facility]     [address] [id number]

[address] [place] [address], [place] [address]

                          17 2014               

 

                          [facility]     [address] [id number]

[address] [place] [address], [place] [address]

                          17 2014               

 

                          [facility]     [address] [id number]

[address] [place] [address], [place] [address]

                          10 2014               

 

                          [facility]     [address] [id number]

[address] [place] [address], [place] [address]

                          10 Wellington, 2014               

 

                          [facility]     [address] [id number]

[address] [place] [address], [place] [address]

                                         

 

                          [facility]     [address] [id number]

[address] [place] [address], [place] [address]

                                         

 

                          [facility]     [address] [id number]

[address] [place] [address], [place] [address]

                          15 2013               

 

                          [facility]     [address] [id number]

[address] [place] [address], [place] [address]

                          14 2013               

 

                          [facility]     [address] [id number]

[address] [place] [address], [place] [address]

                          14 2013               

 

                          [facility]     [address] [id number]

[address] [place] [address], [place] [address]

                          12 2013               

 

                          [facility]     [address] [id number]

[address] [place] [address], [place] [address]

                          12 2013               

 

                          [facility]     [address] [id number]

[address] [place] [address], [place] [address]

                          05 2013               

 

                          [facility]     [address] [id number]

[address] [place] [address], [place] [address]

                          05 2013               

 

                          [facility]     [address] [id number]

[address] [place] [address], [place] [address]

                          29 Oct, 2013               

 

                          [facility]     [address] [id number]

[address] [place] [address], [place] [address]

                          29 Oct, 2013               

 

                          [facility]     [address] [id number]

[address] [place] [address], [place] [address]

                          13 Aug, 2013               

 

                          [facility]     [address] [id number]

[address] [place] [address], [place] [address]

                          23 May, 2013               

 

                          [facility]     [address] [id number]

[address] [place] [address], [place] [address]

                                         

 

                          [facility]     [address] [id number]

[address] [place] [address], [place] [address]

                                         

 

                          [facility]     [address] [id number]

[address] [place] [address], [place] [address]

                          12 Mar, 2013               

 

                          [facility]     [address] [id number]

[address] [place] [address], [place] [address]

                          05 Mar, 2013               

 

                          [facility]     [address] [id number]

[address] [place] [address], [place] [address]

                                         

 

                          [facility]     [address] [id number]

[address] [place] [address], [place] [address]

                                         

 

                          [facility]     [address] [id number]

[address] [place] [address], [place] [address]

                                         

 

                          [facility]     [address] [id number]

[address] [place] [address], [place] [address]

                          18 Dec, 2009               

 

                          [facility]     [address] [id number]

[address] [place] [address], [place] [address]

                                         

 

                          [facility]     [address] [id number]

[address] [place] [address], [place] [address]

                          26 Oct, 2009               

 

                          [facility]     [address] [id number]

[address] [place] [address], [place] [address]

                          23 Oct, 2009               

 

                          [facility]     [address] [id number]

[address] [place] [address], [place] [address]

                          14 Sep, 2009               







IMMUNIZATIONS

No Known Immunizations



SOCIAL HISTORY

Never Assessed



REASON FOR VISIT





PLAN OF CARE





VITAL SIGNS





MEDICATIONS

Unknown Medications



RESULTS

No Results



PROCEDURES

No Known procedures



INSTRUCTIONS





MEDICATIONS ADMINISTERED

No Known Medications



MEDICAL (GENERAL) HISTORY





                    Type                Description         Date

 

                    Medical History     Diabetes             

 

                    Surgical History    Left hip surgery    

 

                    Surgical History    tonsilectomy         

 

                    Hospitalization History  [facility]

7

## 2020-04-29 ENCOUNTER — HOSPITAL ENCOUNTER (EMERGENCY)
Dept: HOSPITAL 75 - ER | Age: 30
Discharge: HOME | End: 2020-04-29
Payer: MEDICAID

## 2020-04-29 VITALS — DIASTOLIC BLOOD PRESSURE: 89 MMHG | SYSTOLIC BLOOD PRESSURE: 123 MMHG

## 2020-04-29 VITALS — BODY MASS INDEX: 20.68 KG/M2 | WEIGHT: 161.16 LBS | HEIGHT: 73.98 IN

## 2020-04-29 DIAGNOSIS — Z80.8: ICD-10-CM

## 2020-04-29 DIAGNOSIS — Z87.891: ICD-10-CM

## 2020-04-29 DIAGNOSIS — Z79.4: ICD-10-CM

## 2020-04-29 DIAGNOSIS — Z77.22: ICD-10-CM

## 2020-04-29 DIAGNOSIS — E10.40: ICD-10-CM

## 2020-04-29 DIAGNOSIS — L02.31: Primary | ICD-10-CM

## 2020-04-29 DIAGNOSIS — Z88.5: ICD-10-CM

## 2020-04-29 DIAGNOSIS — Z88.8: ICD-10-CM

## 2020-04-29 PROCEDURE — 87186 SC STD MICRODIL/AGAR DIL: CPT

## 2020-04-29 PROCEDURE — 87077 CULTURE AEROBIC IDENTIFY: CPT

## 2020-04-29 PROCEDURE — 87205 SMEAR GRAM STAIN: CPT

## 2020-04-29 PROCEDURE — 87070 CULTURE OTHR SPECIMN AEROBIC: CPT

## 2020-04-29 NOTE — ED GENERAL
General


Chief Complaint:  Skin/Wound Problems


Stated Complaint:  ABSCESS


Nursing Triage Note:  


pt reports abcess on lower back that started about 1 week ago. pt reports pain 


6/10.


Nursing Sepsis Screen:  No Definite Risk


Source of Information:  Patient


Exam Limitations:  No Limitations





History of Present Illness


Date Seen by Provider:  Apr 29, 2020


Time Seen by Provider:  10:50


Initial Comments


This 30 year old man with DM type one presents to the ER with a large abscess 

just superior to the left buttock worsening over the past 4 days.  He denies f

ever.  It is starting to drain. He reports blood sugars have been well 

controlled.  He reports recently stopping methamphetamines about 2 months ago.





Allergies and Home Medications


Allergies


Coded Allergies:  


     fluoxetine (Verified  Allergy, Unknown, 1/9/20)


     trazodone (Verified  Allergy, Unknown, 1/9/20)





Home Medications


Amoxicillin/Potassium Clav 1 Each Tablet, 1 EACH PO BID


   Prescribed by: OLEG PIERSON on 1/11/20 1312


Hydrocodone/Acetaminophen 1 Each Tablet, 1 EACH PO Q4-6HR PRN for PAIN-MODERATE


   Prescribed by: MARTA BOONE on 4/27/20 1408


Insulin Lispro 100 Unit/1 Ml Vial, SC UD, (Reported)


   80 UNITS PER DAY PER INSULIN PUMP 


Sulfamethoxazole/Trimethoprim 1 Each Tablet, 1 EACH PO BID


   Prescribed by: MARTA BOONE on 4/27/20 1407


Sulfamethoxazole/Trimethoprim 1 Each Tablet, 1 EACH PO TID


   Prescribed by: ANTHONY THOMPSON on 4/29/20 1145





Patient Home Medication List


Home Medication List Reviewed:  Yes





Review of Systems


Review of Systems


Constitutional:  no symptoms reported


EENTM:  no symptoms reported


Respiratory:  no symptoms reported


Cardiovascular:  no symptoms reported


Gastrointestinal:  no symptoms reported


Genitourinary:  no symptoms reported


Musculoskeletal:  no symptoms reported


Skin:  see HPI


Psychiatric/Neurological:  No Symptoms Reported


Hematologic/Lymphatic:  No Symptoms Reported


Immunological/Allergic:  no symptoms reported





Past Medical-Social-Family Hx


Past Med/Social Hx:  Reviewed Nursing Past Med/Soc Hx


Patient Social History


Alcohol Use:  Denies Use


Recreational Drug Use:  Yes


Drug of Choice:  marijuana weekly, stopped meth in feb 2020


Type Used:  Cigarettes


Former Smoker, Quit:  Nov 1, 2019


2nd Hand Smoke Exposure:  Yes


Recent Foreign Travel:  No


Contact w/Someone Who Travel:  No


Recent Infectious Disease Expo:  No


Recent Hopitalizations:  No


Physical Abuse:  No


Sexual Abuse:  No





Immunizations Up To Date


Tetanus Booster (TDap):  Unknown


PED Vaccines UTD:  Yes





Seasonal Allergies


Seasonal Allergies:  No





Past Medical History


Surgeries:  Yes (abscess drainage)


Orthopedic


Respiratory:  No


Asthma


Currently Using CPAP:  No


Currently Using BIPAP:  No


Cardiac:  No


Neurological:  Yes


Neuropathy


Reproductive Disorders:  No


Sexually Transmitted Disease:  No


HIV/AIDS:  No


Genitourinary:  Yes


Prostate Problems


Gastrointestinal:  Yes


Gastroesophageal Reflux, Liver Disease/Jaundice, Chronic Constipation


Musculoskeletal:  Yes (LEFT HIP FX/ORIF 2017)


Fractures


Endocrine:  Yes (TYPE 1 DIABETES, WITH INSULIN PUMP AS OF 01/09/20-EXTREME NON-

COMPLIANCE)


Diabetes, Insulin dep


HEENT:  No


Cancer:  No


Psychosocial:  Yes (POLYSUBSTANCE)


Anxiety, Bipolar, Depression


Integumentary:  Yes


Eczema


Blood Disorders:  No


Adverse Reaction/Blood Tranf:  No





Family Medical History





Diabetes mellitus


  19 FATHER


  UNCLE


FH: COPD (chronic obstructive pulmonary disease)


  19 MOTHER


FH: brain cancer


  AUNT


FH: diverticulitis


  19 FATHER


Thyroid disease


  19 MOTHER


No Pertinent Family Hx





Physical Exam


Vital Signs





Vital Signs - First Documented








 4/29/20





 10:49


 


Temp 36.8


 


Pulse 110


 


Resp 16


 


B/P (MAP) 130/96 (107)


 


Pulse Ox 99


 


O2 Delivery Room Air





Capillary Refill : Less Than 3 Seconds


Height, Weight, BMI


Height: 5'10.00"


Weight: 160lbs. 3.0oz. [id number]; 20.00 BMI


Method:Stated


General Appearance:  No Apparent Distress, WD/WN


HEENT:  PERRL/EOMI, Normal ENT Inspection


Respiratory:  Lungs Clear, Normal Breath Sounds, No Accessory Muscle Use


Cardiovascular:  No Edema, No Murmur, Tachycardia


Extremity:  Normal Inspection, No Pedal Edema


Neurologic/Psychiatric:  Alert, Oriented x3, No Motor/Sensory Deficits, Normal 

Mood/Affect, CNs II-XII Norm as Tested


Skin:  Normal Color, Warm/Dry, Other (Very large, painful, inflammed, bulging 

abscess superior to the left buttock with two nearly erupting heads)





Procedures/Interventions


I&D :  


   Blade Size:  11


Progress


Skin was prepped with chlorhexidine.  Buffered lidocaine was injected around the

periphery of the abscess to provide anesthesia.  About a mL were used.  A 1 cm 

incision was made directly over the center of the abscess.  A substantial amount

of purulent drainage (estimated about 30 ML) was expressed from the incision.  

Loculations were broken with a hemostat.  Morbid purulent material was 

expressed.  About 300 mL of normal saline was used to irrigate the abscess.  

Dressings were applied.





Progress/Results/Core Measures


Suspected Sepsis


Recent Fever Within 48 Hours:  No


Infection Criteria Present:  None


New/Unexplained  Altered Menta:  No


Sepsis Screen:  No Definite Risk


SIRS


Temperature: 


Pulse: 110 


Respiratory Rate: 16


 


Blood Pressure 130 /96 


Mean: 107





Results/Orders


My Orders





Orders - ANTHONY MA MD


Lidocaine/Epi 2% 1:100,000 (Xylocaine/Ep (4/29/20 11:00)


Wound Culture (4/29/20 11:37)





Medications Given in ED





Current Medications








 Medications  Dose


 Ordered  Sig/Dara


 Route  Start Time


 Stop Time Status Last Admin


Dose Admin


 


 Lidocaine/


 Epinephrine  20 ml  ONCE  ONCE


 INJ  4/29/20 11:00


 4/29/20 11:01 DC 4/29/20 11:06


20 ML








Vital Signs/I&O











 4/29/20 4/29/20





 10:49 11:57


 


Temp 36.8 36.8


 


Pulse 110 100


 


Resp 16 16


 


B/P (MAP) 130/96 (107) 123/89 (107)


 


Pulse Ox 99 99


 


O2 Delivery Room Air 





Capillary Refill : Less Than 3 Seconds








Blood Pressure Mean:                    107











Departure


Impression





   Primary Impression:  


   Abscess


   Additional Impression:  


   Encounter for incision and drainage procedure


Disposition:  01 HOME, SELF-CARE


Condition:  Improved





Departure-Patient Inst.


Decision time for Depature:  11:42


Referrals:  


VERONICA MENDES MD (PCP/Family)


Primary Care Physician


Patient Instructions:  Skin Abscess, Abscess Incision and Drainage





Add. Discharge Instructions:  


Complete your antibiotics as prescribed.


You may soak in warm soapy water for 15 minutes 2-3 times a day for the next few

days to encourage drainage.


Tightly control you blood sugars.


Follow-up with your primary care provider early next week.


Return to the ER for wound check or worsening symptoms as needed. 


Take Tylenol up to 1000 mg every 6 hours as needed for pain.  Add Ibuprofen 

sparingly for short term relief if needed.  





All discharge instructions reviewed with patient and/or family. Voiced 

understanding.


Scripts


Sulfamethoxazole/Trimethoprim (Bactrim Ds Tablet) 1 Each Tablet


1 EACH PO TID, #20 TAB


   Prov: ANTHONY MA MD         4/29/20





Copy


Copies To 1:   VERONICA MENDES MD, JOSHUA T MD        Apr 29, 2020 11:44

## 2021-02-15 ENCOUNTER — HOSPITAL ENCOUNTER (INPATIENT)
Dept: HOSPITAL 75 - ER | Age: 31
LOS: 1 days | Discharge: LEFT BEFORE DISCHARGE | DRG: 871 | End: 2021-02-16
Attending: FAMILY MEDICINE | Admitting: FAMILY MEDICINE
Payer: MEDICAID

## 2021-02-15 VITALS — WEIGHT: 161.38 LBS | HEIGHT: 74.02 IN | BODY MASS INDEX: 20.71 KG/M2

## 2021-02-15 VITALS — SYSTOLIC BLOOD PRESSURE: 128 MMHG | DIASTOLIC BLOOD PRESSURE: 77 MMHG

## 2021-02-15 DIAGNOSIS — L03.115: ICD-10-CM

## 2021-02-15 DIAGNOSIS — F17.210: ICD-10-CM

## 2021-02-15 DIAGNOSIS — K21.9: ICD-10-CM

## 2021-02-15 DIAGNOSIS — F41.9: ICD-10-CM

## 2021-02-15 DIAGNOSIS — Z91.19: ICD-10-CM

## 2021-02-15 DIAGNOSIS — E10.10: ICD-10-CM

## 2021-02-15 DIAGNOSIS — Z82.5: ICD-10-CM

## 2021-02-15 DIAGNOSIS — Z79.4: ICD-10-CM

## 2021-02-15 DIAGNOSIS — Z83.3: ICD-10-CM

## 2021-02-15 DIAGNOSIS — Z79.2: ICD-10-CM

## 2021-02-15 DIAGNOSIS — F31.9: ICD-10-CM

## 2021-02-15 DIAGNOSIS — E10.42: ICD-10-CM

## 2021-02-15 DIAGNOSIS — J45.909: ICD-10-CM

## 2021-02-15 DIAGNOSIS — Z79.891: ICD-10-CM

## 2021-02-15 DIAGNOSIS — E87.1: ICD-10-CM

## 2021-02-15 DIAGNOSIS — A41.9: Primary | ICD-10-CM

## 2021-02-15 LAB
ALBUMIN SERPL-MCNC: 3.6 GM/DL (ref 3.2–4.5)
ALP SERPL-CCNC: 195 U/L (ref 40–136)
ALT SERPL-CCNC: 18 U/L (ref 0–55)
APTT BLD: 35 SEC (ref 24–35)
APTT PPP: YELLOW S
BACTERIA #/AREA URNS HPF: NEGATIVE /HPF
BARBITURATES UR QL: NEGATIVE
BASOPHILS # BLD AUTO: 0.1 10^3/UL (ref 0–0.1)
BASOPHILS NFR BLD AUTO: 0 % (ref 0–10)
BASOPHILS NFR BLD MANUAL: 1 %
BENZODIAZ UR QL SCN: NEGATIVE
BILIRUB SERPL-MCNC: 0.4 MG/DL (ref 0.1–1)
BILIRUB UR QL STRIP: NEGATIVE
BUN/CREAT SERPL: 6
BUN/CREAT SERPL: 7
CALCIUM SERPL-MCNC: 8.1 MG/DL (ref 8.5–10.1)
CALCIUM SERPL-MCNC: 8.9 MG/DL (ref 8.5–10.1)
CHLORIDE SERPL-SCNC: 101 MMOL/L (ref 98–107)
CHLORIDE SERPL-SCNC: 91 MMOL/L (ref 98–107)
CO2 SERPL-SCNC: 21 MMOL/L (ref 21–32)
CO2 SERPL-SCNC: 24 MMOL/L (ref 21–32)
COCAINE UR QL: NEGATIVE
CREAT SERPL-MCNC: 0.87 MG/DL (ref 0.6–1.3)
CREAT SERPL-MCNC: 1.28 MG/DL (ref 0.6–1.3)
EOSINOPHIL # BLD AUTO: 0 10^3/UL (ref 0–0.3)
EOSINOPHIL NFR BLD AUTO: 0 % (ref 0–10)
ERYTHROCYTE [SEDIMENTATION RATE] IN BLOOD: 67 MM/HR (ref 0–15)
FIBRINOGEN PPP-MCNC: CLEAR MG/DL
GFR SERPLBLD BASED ON 1.73 SQ M-ARVRAT: > 60 ML/MIN
GFR SERPLBLD BASED ON 1.73 SQ M-ARVRAT: > 60 ML/MIN
GLUCOSE SERPL-MCNC: 685 MG/DL (ref 70–105)
GLUCOSE SERPL-MCNC: 98 MG/DL (ref 70–105)
GLUCOSE UR STRIP-MCNC: (no result) MG/DL
HCT VFR BLD CALC: 38 % (ref 40–54)
HGB BLD-MCNC: 13.3 G/DL (ref 13.3–17.7)
INR PPP: 1 (ref 0.8–1.4)
KETONES UR QL STRIP: (no result)
LEUKOCYTE ESTERASE UR QL STRIP: NEGATIVE
LYMPHOCYTES # BLD AUTO: 2.2 X 10^3 (ref 1–4)
LYMPHOCYTES NFR BLD AUTO: 11 % (ref 12–44)
MAGNESIUM SERPL-MCNC: 2.3 MG/DL (ref 1.6–2.4)
MANUAL DIFFERENTIAL PERFORMED BLD QL: YES
MCH RBC QN AUTO: 30 PG (ref 25–34)
MCHC RBC AUTO-ENTMCNC: 35 G/DL (ref 32–36)
MCV RBC AUTO: 86 FL (ref 80–99)
METHADONE UR QL SCN: NEGATIVE
METHAMPHETAMINE SCREEN URINE S: NEGATIVE
MONOCYTES # BLD AUTO: 1.3 X 10^3 (ref 0–1)
MONOCYTES NFR BLD AUTO: 6 % (ref 0–12)
MONOCYTES NFR BLD: 8 %
NEUTROPHILS # BLD AUTO: 17 X 10^3 (ref 1.8–7.8)
NEUTROPHILS NFR BLD AUTO: 82 % (ref 42–75)
NEUTS BAND NFR BLD MANUAL: 78 %
NITRITE UR QL STRIP: NEGATIVE
OPIATES UR QL SCN: NEGATIVE
OXYCODONE UR QL: NEGATIVE
PH UR STRIP: 6 [PH] (ref 5–9)
PLATELET # BLD: 482 10^3/UL (ref 130–400)
PMV BLD AUTO: 9.4 FL (ref 9–12.2)
POTASSIUM SERPL-SCNC: 3.8 MMOL/L (ref 3.6–5)
POTASSIUM SERPL-SCNC: 3.8 MMOL/L (ref 3.6–5)
PROPOXYPH UR QL: NEGATIVE
PROT SERPL-MCNC: 7.6 GM/DL (ref 6.4–8.2)
PROT UR QL STRIP: NEGATIVE
PROTHROMBIN TIME: 13.6 SEC (ref 12.2–14.7)
RBC #/AREA URNS HPF: (no result) /HPF
RBC MORPH BLD: NORMAL
SODIUM SERPL-SCNC: 128 MMOL/L (ref 135–145)
SODIUM SERPL-SCNC: 137 MMOL/L (ref 135–145)
SP GR UR STRIP: <=1.005 (ref 1.02–1.02)
SQUAMOUS #/AREA URNS HPF: (no result) /HPF
TRICYCLICS UR QL SCN: NEGATIVE
VARIANT LYMPHS NFR BLD MANUAL: 13 %
WBC # BLD AUTO: 20.6 10^3/UL (ref 4.3–11)
WBC #/AREA URNS HPF: (no result) /HPF

## 2021-02-15 PROCEDURE — 85730 THROMBOPLASTIN TIME PARTIAL: CPT

## 2021-02-15 PROCEDURE — 73630 X-RAY EXAM OF FOOT: CPT

## 2021-02-15 PROCEDURE — 73700 CT LOWER EXTREMITY W/O DYE: CPT

## 2021-02-15 PROCEDURE — 83735 ASSAY OF MAGNESIUM: CPT

## 2021-02-15 PROCEDURE — 87081 CULTURE SCREEN ONLY: CPT

## 2021-02-15 PROCEDURE — 87070 CULTURE OTHR SPECIMN AEROBIC: CPT

## 2021-02-15 PROCEDURE — 85025 COMPLETE CBC W/AUTO DIFF WBC: CPT

## 2021-02-15 PROCEDURE — 80306 DRUG TEST PRSMV INSTRMNT: CPT

## 2021-02-15 PROCEDURE — 84145 PROCALCITONIN (PCT): CPT

## 2021-02-15 PROCEDURE — 83605 ASSAY OF LACTIC ACID: CPT

## 2021-02-15 PROCEDURE — 85652 RBC SED RATE AUTOMATED: CPT

## 2021-02-15 PROCEDURE — 86141 C-REACTIVE PROTEIN HS: CPT

## 2021-02-15 PROCEDURE — 85027 COMPLETE CBC AUTOMATED: CPT

## 2021-02-15 PROCEDURE — 87205 SMEAR GRAM STAIN: CPT

## 2021-02-15 PROCEDURE — 87186 SC STD MICRODIL/AGAR DIL: CPT

## 2021-02-15 PROCEDURE — 87040 BLOOD CULTURE FOR BACTERIA: CPT

## 2021-02-15 PROCEDURE — 80053 COMPREHEN METABOLIC PANEL: CPT

## 2021-02-15 PROCEDURE — 85610 PROTHROMBIN TIME: CPT

## 2021-02-15 PROCEDURE — 80048 BASIC METABOLIC PNL TOTAL CA: CPT

## 2021-02-15 PROCEDURE — 36415 COLL VENOUS BLD VENIPUNCTURE: CPT

## 2021-02-15 PROCEDURE — 71045 X-RAY EXAM CHEST 1 VIEW: CPT

## 2021-02-15 PROCEDURE — 82010 KETONE BODYS QUAN: CPT

## 2021-02-15 PROCEDURE — 81000 URINALYSIS NONAUTO W/SCOPE: CPT

## 2021-02-15 PROCEDURE — 93041 RHYTHM ECG TRACING: CPT

## 2021-02-15 PROCEDURE — 82962 GLUCOSE BLOOD TEST: CPT

## 2021-02-15 PROCEDURE — 84100 ASSAY OF PHOSPHORUS: CPT

## 2021-02-15 PROCEDURE — 85007 BL SMEAR W/DIFF WBC COUNT: CPT

## 2021-02-15 PROCEDURE — 83036 HEMOGLOBIN GLYCOSYLATED A1C: CPT

## 2021-02-15 PROCEDURE — 87077 CULTURE AEROBIC IDENTIFY: CPT

## 2021-02-15 RX ADMIN — SODIUM CHLORIDE SCH MLS/HR: 900 INJECTION, SOLUTION INTRAVENOUS at 22:15

## 2021-02-15 RX ADMIN — SODIUM CHLORIDE, SODIUM LACTATE, POTASSIUM CHLORIDE, AND CALCIUM CHLORIDE SCH MLS/HR: 600; 310; 30; 20 INJECTION, SOLUTION INTRAVENOUS at 22:16

## 2021-02-15 NOTE — ED GENERAL
General


Chief Complaint:  Skin/Wound Problems


Stated Complaint:  R FOOT INFECTION/ELEV BS


Nursing Triage Note:  


PT AMB TO [facility] WITH COMPLAINT OF RIGHT FOOT INFECTION. STATES HE STEPPED ON A 


PIECE OF GLASS A WEEK AGO. STATES BLOOD SUGARS HAVE BEEN INCREASING AT HOME 


ALSO.


Nursing Sepsis Screen:  No Definite Risk


Source of Information:  Patient, Old Records





History of Present Illness


Date Seen by Provider:  Feb 15, 2021


Time Seen by Provider:  15:24


Initial Comments


PT ARRIVES VIA POV FROM [facility]


PT WENT THERE TODAY FOR RIGHT FOOT INFECTION AND ELEVATED BLOOD SUGAR, PT BECAME

ANGRY WHEN TOLD THAT HE NEEDED HGB A1C AND HE STORMED OUT OF THE CLINIC. 


PT STATES THAT HE STEPPED ON A PIECE OF GLASS A WEEK AGO--STATES "DIDN'T KNOW I 

DID IT"--PT WITH PERIPHERAL NEUROPATHY. 


STATES HIS FOOT HAS BEEN RED AND SWOLLEN FOR THE LAST WEEK, HAS ALSO BEEN 

DRAINING. 





PT IS TYPE 1 DIABETIC, WITH LONGSTANDING NON-COMPLIANCE IN ALL ASPECTS OF CARE. 


PT NOW HAS AN INSULIN PUMP, STATES HIS BLOOD SUGARS HAVE BEEN HIGH FOR THE LAST 

WEEK 








SYMPTOMS ARE NO DIFFERENT TODAY


PT DENIES FEVER


DENIES RECENT ILLNESS





STATES HE "RELAPSED 3 WEEKS AGO" AND HAS BEEN USING METH REGULARLY SINCE THEN. 

PT ALSO USES MARIJUANA ON REGULAR BASIS





PT WITH A MULTITUDE OF VISITS HERE


LONG HISTORY OF EXTREME NON-COMPLIANCE IN ALL ASPECTS OF CARE. 





SEE OLD CHARTS FOR DETAILS





PCP: DR. MENDES, [facility]





Allergies and Home Medications


Allergies


Coded Allergies:  


     fluoxetine (Verified  Allergy, Unknown, 1/9/20)


     trazodone (Verified  Allergy, Unknown, 1/9/20)





Home Medications


Amoxicillin/Potassium Clav 1 Each Tablet, 1 EACH PO BID


   Prescribed by: OLEG PIERSON on 1/11/20 1312


Hydrocodone/Acetaminophen 1 Each Tablet, 1 EACH PO Q4-6HR PRN for PAIN-MODERATE


   Prescribed by: MARTA BOONE on 4/27/20 1408


Insulin Lispro 100 Unit/1 Ml Vial, SC UD, (Reported)


   80 UNITS PER DAY PER INSULIN PUMP 


Sulfamethoxazole/Trimethoprim 1 Each Tablet, 1 EACH PO BID


   Prescribed by: MARTA BOONE on 4/27/20 1407


Sulfamethoxazole/Trimethoprim 1 Each Tablet, 1 EACH PO TID


   Prescribed by: ANTHONY THOMPSON on 4/29/20 1145





Patient Home Medication List


Home Medication List Reviewed:  Yes





Review of Systems


Review of Systems


Constitutional:  no symptoms reported


Respiratory:  no symptoms reported


Cardiovascular:  no symptoms reported


Gastrointestinal:  no symptoms reported


Genitourinary:  frequency


Musculoskeletal:  see HPI


Skin:  see HPI


Psychiatric/Neurological:  See HPI, Pre-Existing Deficit (PERIPHERAL NEUROPATHY)


Hematologic/Lymphatic:  No Symptoms Reported


Immunological/Allergic:  no symptoms reported





Past Medical-Social-Family Hx


Past Med/Social Hx:  Reviewed and Corrections made


Patient Social History


Alcohol Use:  Denies Use


Drug of Choice:  THC, METH


Smoking Status:  Current Everyday Smoker


Type Used:  Cigarettes


2nd Hand Smoke Exposure:  Yes


Recent Infectious Disease Expo:  No


Recent Hopitalizations:  No





Immunizations Up To Date


Tetanus Booster (TDap):  Unknown


PED Vaccines UTD:  Yes





Seasonal Allergies


Seasonal Allergies:  No





Past Medical History


Surgeries:  Yes (ABSCESS I&D'S; LEFT HIP FX/ORIF 2017)


Orthopedic, Tonsillectomy


Respiratory:  Yes


Asthma


Currently Using CPAP:  No


Currently Using BIPAP:  No


Cardiac:  No


Neurological:  Yes


Neuropathy


Reproductive Disorders:  No


Sexually Transmitted Disease:  No


HIV/AIDS:  No


Genitourinary:  Yes


Prostate Problems


Gastrointestinal:  Yes


Gastroesophageal Reflux, Liver Disease/Jaundice, Chronic Constipation


Musculoskeletal:  Yes (LEFT HIP FX/ORIF 2017)


Fractures


Endocrine:  Yes (TYPE 1 DIABETES, WITH INSULIN PUMP AS OF 01/09/20-EXTREME NON-

COMPLIANCE)


Diabetes, Insulin dep


HEENT:  No


Cancer:  No


Psychosocial:  Yes (POLYSUBSTANCE ABUSE)


Anxiety, Bipolar, Depression


Integumentary:  Yes


Eczema


Blood Disorders:  No


Adverse Reaction/Blood Tranf:  No





Family Medical History





Diabetes mellitus


  19 FATHER


  UNCLE


FH: COPD (chronic obstructive pulmonary disease)


  19 MOTHER


FH: brain cancer


  AUNT


FH: diverticulitis


  19 FATHER


Thyroid disease


  19 MOTHER


No Pertinent Family Hx





SOCIAL HISTORY:


-ETOH-DENIES USE


-DRUGS--SMOKES METH AND MARIJUANA. DENIES IV USE


-SMOKES 1 PPD





PAST SURGICAL HISTORY:


-TONSILLECTOMY


-EGD


-MULTIPLE I&D'S OF ABSCESSES


-LEFT HIP FRACTURE/ORIF 2017





LONG HISTORY OF EXTREME NON-COMPLIANCE IN ALL ASPECTS OF CARE





Physical Exam


Vital Signs





Vital Signs - First Documented








 2/15/21





 15:20


 


Temp 37.5


 


Pulse 125


 


Resp 25


 


B/P (MAP) 139/103 (115)


 


Pulse Ox 99


 


O2 Delivery Room Air





Capillary Refill : Less Than 3 Seconds


Height, Weight, BMI


Height: 5'10.00"


Weight: 160lbs. 3.0oz. 72.961721yb; 20.00 BMI


Method:Stated


General Appearance:  No Apparent Distress, Thin, Other (CONSTANT MOVEMENTS OF 

BODY, ESPECIALLY FEET)


HEENT:  Other (ORAL MUCOSA DRY)


Respiratory:  Normal Breath Sounds, No Accessory Muscle Use, No Respiratory 

Distress


Cardiovascular:  Regular Rate, Rhythm, No Murmur


Gastrointestinal:  Non Tender, Soft


Extremity:  Other (RIGHT FOOT AND ANKLE WITH SIGNIFICANT SWELLING AND ERYTHEMA. 

DORSAL AND PLANTAR ASPECT OF DISTAL / LATERAL ASPECT WITH LARGE AREA OF SUB Q 

PURULENCE)





Focused Exam


Lactate Level


2/15/21 15:27: Lactic Acid Level 3.97*H


2/15/21 17:32: 





Lactic Acid Level





Laboratory Tests








Test


 2/15/21


15:27 2/15/21


17:32


 


Lactic Acid Level


 3.97 MMOL/L


(0.50-2.00)  *H 














Progress/Results/Core Measures


Suspected Sepsis


Recent Fever Within 48 Hours:  No


Infection Criteria Present:  None


New/Unexplained  Altered Menta:  No


Sepsis Screen:  No Definite Risk


SIRS


Temperature: 


Pulse: 125 


Respiratory Rate: 25


 


Laboratory Tests


2/15/21 15:27: White Blood Count 20.6H


Blood Pressure 139 /103 


Mean: 115


 


2/15/21 15:27: Lactic Acid Level 3.97*H


2/15/21 17:32: 


Laboratory Tests


2/15/21 15:27: 


Creatinine 1.28, INR Comment 1.0, Platelet Count 482H, Total Bilirubin 0.4








Results/Orders


Lab Results





Laboratory Tests








Test


 2/15/21


15:24 2/15/21


15:27 2/15/21


16:54 2/15/21


17:32 Range/Units


 


 


Glucometer 591 *H      MG/DL


 


White Blood Count


 


 20.6 H


 


 


 4.3-11.0


10^3/uL


 


Red Blood Count


 


 4.42 


 


 


 4.30-5.52


10^6/uL


 


Hemoglobin  13.3    13.3-17.7  g/dL


 


Hematocrit  38 L   40-54  %


 


Mean Corpuscular Volume  86    80-99  fL


 


Mean Corpuscular Hemoglobin  30    25-34  pg


 


Mean Corpuscular Hemoglobin


Concent 


 35 


 


 


 32-36  g/dL





 


Red Cell Distribution Width  12.0    10.0-14.5  %


 


Platelet Count


 


 482 H


 


 


 130-400


10^3/uL


 


Mean Platelet Volume  9.4    9.0-12.2  fL


 


Immature Granulocyte % (Auto)  0     %


 


Neutrophils (%) (Auto)  82 H   42-75  %


 


Lymphocytes (%) (Auto)  11 L   12-44  %


 


Monocytes (%) (Auto)  6    0-12  %


 


Eosinophils (%) (Auto)  0    0-10  %


 


Basophils (%) (Auto)  0    0-10  %


 


Neutrophils # (Auto)  17.0 H   1.8-7.8  X 10^3


 


Lymphocytes # (Auto)  2.2    1.0-4.0  X 10^3


 


Monocytes # (Auto)  1.3 H   0.0-1.0  X 10^3


 


Eosinophils # (Auto)


 


 0.0 


 


 


 0.0-0.3


10^3/uL


 


Basophils # (Auto)


 


 0.1 


 


 


 0.0-0.1


10^3/uL


 


Immature Granulocyte # (Auto)


 


 0.1 


 


 


 0.0-0.1


10^3/uL


 


Neutrophils % (Manual)  78     %


 


Lymphocytes % (Manual)  13     %


 


Monocytes % (Manual)  8     %


 


Basophils % (Manual)  1     %


 


Blood Morphology Comment  NORMAL     


 


Erythrocyte Sedimentation Rate  67 H   0-15  MM/HR


 


Prothrombin Time  13.6    12.2-14.7  SEC


 


INR Comment  1.0    0.8-1.4  


 


Activated Partial


Thromboplast Time 


 35 


 


 


 24-35  SEC





 


Sodium Level  128 L   135-145  MMOL/L


 


Potassium Level  3.8    3.6-5.0  MMOL/L


 


Chloride Level  91 L     MMOL/L


 


Carbon Dioxide Level  21    21-32  MMOL/L


 


Anion Gap  16 H   5-14  MMOL/L


 


Blood Urea Nitrogen  8    7-18  MG/DL


 


Creatinine


 


 1.28 


 


 


 0.60-1.30


MG/DL


 


Estimat Glomerular Filtration


Rate 


 > 60 


 


 


  





 


BUN/Creatinine Ratio  6     


 


Glucose Level  685 *H     MG/DL


 


Lactic Acid Level


 


 3.97 *H


 


 


 0.50-2.00


MMOL/L


 


Calcium Level  8.9    8.5-10.1  MG/DL


 


Corrected Calcium  9.2    8.5-10.1  MG/DL


 


Magnesium Level  2.3    1.6-2.4  MG/DL


 


Total Bilirubin  0.4    0.1-1.0  MG/DL


 


Aspartate Amino Transf


(AST/SGOT) 


 9 


 


 


 5-34  U/L





 


Alanine Aminotransferase


(ALT/SGPT) 


 18 


 


 


 0-55  U/L





 


Alkaline Phosphatase  195 H     U/L


 


C-Reactive Protein High


Sensitivity 


 22.15 H


 


 


 0.00-0.50


MG/DL


 


Total Protein  7.6    6.4-8.2  GM/DL


 


Albumin  3.6    3.2-4.5  GM/DL


 


Beta-Hydroxybutyrate (Chem


panel) 


 0.96 H


 


 


 0.00-0.27


MMOL/L


 


Procalcitonin  0.22 H   <0.10  NG/ML


 


Urine Color   YELLOW    


 


Urine Clarity   CLEAR    


 


Urine pH   6.0   5-9  


 


Urine Specific Gravity   <=1.005   1.016-1.022  


 


Urine Protein   NEGATIVE   NEGATIVE  


 


Urine Glucose (UA)   3+ H  NEGATIVE  


 


Urine Ketones   1+ H  NEGATIVE  


 


Urine Nitrite   NEGATIVE   NEGATIVE  


 


Urine Bilirubin   NEGATIVE   NEGATIVE  


 


Urine Urobilinogen   0.2   < = 1.0  MG/DL


 


Urine Leukocyte Esterase   NEGATIVE   NEGATIVE  


 


Urine RBC (Auto)   1+ H  NEGATIVE  


 


Urine RBC   0-2    /HPF


 


Urine WBC   NONE    /HPF


 


Urine Squamous Epithelial


Cells 


 


 NONE 


 


  /HPF





 


Urine Crystals   NONE    /LPF


 


Urine Bacteria   NEGATIVE    /HPF


 


Urine Casts   NONE    /LPF


 


Urine Mucus   NEGATIVE    /LPF


 


Urine Culture Indicated   NO    


 


Urine Opiates Screen   NEGATIVE   NEGATIVE  


 


Urine Oxycodone Screen   NEGATIVE   NEGATIVE  


 


Urine Methadone Screen   NEGATIVE   NEGATIVE  


 


Urine Propoxyphene Screen   NEGATIVE   NEGATIVE  


 


Urine Barbiturates Screen   NEGATIVE   NEGATIVE  


 


Ur Tricyclic Antidepressants


Screen 


 


 NEGATIVE 


 


 NEGATIVE  





 


Urine Phencyclidine Screen   NEGATIVE   NEGATIVE  


 


Urine Amphetamines Screen   NEGATIVE   NEGATIVE  


 


Urine Methamphetamines Screen   NEGATIVE   NEGATIVE  


 


Urine Benzodiazepines Screen   NEGATIVE   NEGATIVE  


 


Urine Cocaine Screen   NEGATIVE   NEGATIVE  


 


Urine Cannabinoids Screen   NEGATIVE   NEGATIVE  








My Orders





Orders - JAIME SHIPLEY DO


Accucheck Stat ONCE (2/15/21 15:17)


Ed Iv/Invasive Line Start (2/15/21 15:17)


Monitor-Rhythm Ecg Trace Only (2/15/21 15:17)


Cbc With Automated Diff (2/15/21 15:17)


Comprehensive Metabolic Panel (2/15/21 15:17)


Hs C Reactive Protein (2/15/21 15:17)


Lactic Acid Analyzer (2/15/21 15:17)


Magnesium (2/15/21 15:17)


Procalcitonin (Pct) (2/15/21 15:17)


Protime With Inr (2/15/21 15:17)


Partial Thromboplastin Time (2/15/21 15:17)


Ua Culture If Indicated (2/15/21 15:17)


Blood Culture (2/15/21 15:17)


Erythrocyte Sedimentation Rate (2/15/21 15:17)


Beta Hydroxybutyrate (2/15/21 15:22)


Hemoglobin A1c (2/15/21 15:22)


Drug Screen Stat (Urine) (2/15/21 15:29)


Wound Culture (2/15/21 15:29)


Foot, Right, 3 View (2/15/21 15:29)


Ed Iv/Invasive Line Start (2/15/21 15:29)


Ns Iv 1000 Ml (Sodium Chloride 0.9%) (2/15/21 15:30)


Ns Iv 1000 Ml (Sodium Chloride 0.9%) (2/15/21 15:26)


Manual Differential (2/15/21 15:27)


Ct Extremity Lower Right Wo (2/15/21 16:11)


Arterial Blood Gas (2/15/21 16:17)


Insulin (Regular) Human (Novolin R (Per (2/15/21 16:30)


Piperacillin Sodium/Tazobactam (Zosyn Vi (2/15/21 17:00)


Vancomycin Injection (Vancomycin Injecti (2/15/21 17:00)


Ed Iv/Invasive Line Start (2/15/21 17:07)


Ns Iv 1000 Ml (Sodium Chloride 0.9%) (2/15/21 17:15)





Medications Given in ED





Current Medications








 Medications  Dose


 Ordered  Sig/Dara


 Route  Start Time


 Stop Time Status Last Admin


Dose Admin


 


 Insulin Human


 Regular  20 unit  ONCE  ONCE


 IV  2/15/21 16:30


 2/15/21 16:31 DC 2/15/21 17:20


20 UNIT


 


 Piperacillin Sod/


 Tazobactam Sod


 4.5 gm/Sodium


 Chloride  100 ml @ 


 200 mls/hr  ONCE  ONCE


 IV  2/15/21 17:00


 2/15/21 17:29 DC 2/15/21 17:18


200 MLS/HR








Vital Signs/I&O











 2/15/21





 15:20


 


Temp 37.5


 


Pulse 125


 


Resp 25


 


B/P (MAP) 139/103 (115)


 


Pulse Ox 99


 


O2 Delivery Room Air





Capillary Refill : Less Than 3 Seconds








Blood Pressure Mean:                    115











Point of Care Testing


Finger Stick Blood Glucose:  591


Progress Note :  


Progress Note


ACCUCHECK 591


GIVEN IV FLUIDS AND INSULIN


CULTURE OBTAINED FROM FOOT WOUND


PT ADAMANTLY REFUSES ABG'S





Diagnostic Imaging





Comments


XRAYS RIGHT FOOT--PER RADIOLOGIST REPORT AT 1551


FINDINGS: No acute fracture or dislocation is identified. No


abnormal lytic or sclerotic focus is seen, and there is no


radiopaque foreign body.





IMPRESSION: No acute abnormality





CT RIGHT FOOT--PER RADIOLOGIST REPORT AT 1658


.FINDINGS:





Soft tissues: No gas is noted within the soft tissues of the foot


that would suggest necrotizing fasciitis. There is a moderate


amount of dorsal subcutaneous edema and/or cellulitis. This


cannot be differentiated by imaging. There does not appear to be


a deep fluid collection within the foot, although soft tissue


contrast is suboptimal by CT when compared to MRI.





Bones: No fracture or osseous erosions. No periosteal reaction is


noted. No osseous coalition.





IMPRESSION:


1. No soft tissue gas to suggest necrotizing fasciitis.


2. Extensive soft tissue swelling in the dorsal aspect of the


foot. By CT, there is no identifiable fluid collection to suggest


a drainable abscess.


3. No CT features of osteomyelitis.


   Reviewed:  Reviewed by Me





Departure


Communication (Admissions)


1700--SPOKE WITH DR. GA, ACCEPTS PT FOR ADMIT


1702--SPOKE WITH DR. CONRAD FOR SURGERY CONSULT


1718--DR. CONRAD HERE TO SEE PT





Impression





   Primary Impression:  


   DKA (diabetic ketoacidoses)


   Additional Impressions:  


   Cellulitis of right foot


   Sepsis


   Uncontrolled type 1 diabetes mellitus


   Hyponatremia


Disposition:  09 ADMITTED AS INPATIENT


Condition:  Stable (ERASED)





Departure-Patient Inst.


Referrals:  


VERONICA MENDES MD (PCP/Family)


Primary Care Physician











JAIME SHIPLEY DO                 Feb 15, 2021 15:58

## 2021-02-15 NOTE — DIAGNOSTIC IMAGING REPORT
PROCEDURE: CT right lower extremity without contrast.



TECHNIQUE: Axially acquired CT was obtained through the right

lower extremity without intravenous contrast. Coronal and

sagittal reformations were also performed. Auto Exposure Controls

were utilized during the CT exam to meet ALARA standards for

radiation dose reduction. 



INDICATION: Diabetes, right foot infection with swollen soft

tissues that are red.



COMPARISON: Right foot radiographs of 02/15/2021.



FINDINGS:



Soft tissues: No gas is noted within the soft tissues of the foot

that would suggest necrotizing fasciitis. There is a moderate

amount of dorsal subcutaneous edema and/or cellulitis. This

cannot be differentiated by imaging. There does not appear to be

a deep fluid collection within the foot, although soft tissue

contrast is suboptimal by CT when compared to MRI.



Bones: No fracture or osseous erosions. No periosteal reaction is

noted. No osseous coalition.



IMPRESSION:

1. No soft tissue gas to suggest necrotizing fasciitis.

2. Extensive soft tissue swelling in the dorsal aspect of the

foot. By CT, there is no identifiable fluid collection to suggest

a drainable abscess.

3. No CT features of osteomyelitis.



Dictated by: 



  Dictated on workstation # [id number]

## 2021-02-15 NOTE — DIAGNOSTIC IMAGING REPORT
Indication: Right foot pain



AP, oblique and lateral views of the right foot are obtained. 



FINDINGS: No acute fracture or dislocation is identified. No

abnormal lytic or sclerotic focus is seen, and there is no

radiopaque foreign body.



IMPRESSION: No acute abnormality.



Dictated by: 



  Dictated on workstation # [id number]

## 2021-02-15 NOTE — CONSULTATION - SURGERY
History of Present Illness


History of Present Illness


Patient Consulted On(gracie/time)


2/15/21


 17:59


Date Seen by Provider:  Feb 15, 2021


Time Seen by Provider:  17:01


History of Present Illness


Surgery asked to consult regarding right foot Cellulitis.





HPI per ED:  PT AMB TO [facility] WITH COMPLAINT OF RIGHT FOOT INFECTION. STATES HE 

STEPPED ON A PIECE OF GLASS A WEEK AGO. STATES BLOOD SUGARS HAVE BEEN INCREASING

AT HOME ALSO.





PT ARRIVES VIA POV FROM [facility], PT WENT THERE TODAY FOR RIGHT FOOT INFECTION 

AND ELEVATED BLOOD SUGAR, PT BECAME ANGRY WHEN TOLD THAT HE NEEDED HGB A1C AND 

HE STORMED OUT OF THE CLINIC. 


PT STATES THAT HE STEPPED ON A PIECE OF GLASS A WEEK AGO--STATES "DIDN'T KNOW I 

DID IT"--PT WITH PERIPHERAL NEUROPATHY.  STATES HIS FOOT HAS BEEN RED AND 

SWOLLEN FOR THE LAST WEEK, HAS ALSO BEEN DRAINING. 


PT IS TYPE 1 DIABETIC, WITH LONGSTANDING NON-COMPLIANCE IN ALL ASPECTS OF CARE. 

PT NOW HAS AN INSULIN PUMP, STATES HIS BLOOD SUGARS HAVE BEEN HIGH FOR THE LAST 

WEEK 





SYMPTOMS ARE NO DIFFERENT TODAY, PT DENIES FEVER, DENIES RECENT ILLNESS.  STATES

HE "RELAPSED 3 WEEKS AGO" AND HAS BEEN USING METH REGULARLY SINCE THEN. PT ALSO 

USES MARIJUANA ON REGULAR BASIS


PT WITH A MULTITUDE OF VISITS HERE.





When I spoke to pt he states he was able to get the glass out, but that there 

has been drainage "from the hole, in the bottom of my foot".  He states he has 

some neuropathy, but still feels almost all the pain in his right foot.  He 

describes pain as constant, dull ache, with occasional sharp pain; not really 

made better by anything.  Rating it at least a 7 out of 10.  He states it has 

never been this bad.





Allergies and Home Medications


Allergies


Coded Allergies:  


     fluoxetine (Verified  Allergy, Unknown, 1/9/20)


     trazodone (Verified  Allergy, Unknown, 1/9/20)





Home Medications


Amoxicillin/Potassium Clav 1 Each Tablet, 1 EACH PO BID


   Prescribed by: OLEG PIERSON on 1/11/20 1312


Hydrocodone/Acetaminophen 1 Each Tablet, 1 EACH PO Q4-6HR PRN for PAIN-MODERATE


   Prescribed by: MARTA BOONE on 4/27/20 1408


Insulin Lispro 100 Unit/1 Ml Vial, SC UD, (Reported)


   80 UNITS PER DAY PER INSULIN PUMP 


Sulfamethoxazole/Trimethoprim 1 Each Tablet, 1 EACH PO BID


   Prescribed by: MARTA BOONE on 4/27/20 1407


Sulfamethoxazole/Trimethoprim 1 Each Tablet, 1 EACH PO TID


   Prescribed by: ANTHONY THOMPSON on 4/29/20 1145





Patient Home Medication List


Home Medication List Reviewed:  Yes





Past Medical-Social-Family Hx


Patient Social History


Drug of Choice:  THC, METH


Smoking Status:  Current Everyday Smoker


Type Used:  Cigarettes


2nd Hand Smoke Exposure:  Yes


Recent Hopitalizations:  No


Substance type:  Methamphetamine





Immunizations Up To Date


Tetanus Booster (TDap):  Unknown


PED Vaccines UTD:  Yes





Seasonal Allergies


Seasonal Allergies:  No





Surgeries


History of Surgeries:  Yes (ABSCESS I&D'S; LEFT HIP FX/ORIF 2017)


Surgeries:  Orthopedic, Tonsillectomy





Respiratory


History of Respiratory Disorde:  Yes


Respiratory Disorders:  Asthma





Cardiovascular


History of Cardiac Disorders:  No





Neurological


History of Neurological Disord:  Yes


Neurological Disorders:  Neuropathy





Reproductive System


Hx Reproductive Disorders:  No


Sexually Transmitted Disease:  No


HIV/AIDS:  No





Genitourinary


History of Genitourinary Disor:  Yes


Genitourinary Disorders:  Prostate Problems





Gastrointestinal


History of Gastrointestinal Di:  Yes


Gastrointestinal Disorders:  Gastroesophageal Reflux, Liver Disease/Jaundice, 

Chronic Constipation





Musculoskeletal


History of Musculoskeletal Dis:  Yes (LEFT HIP FX/ORIF 2017)


Musculoskeletal Disorders:  Fractures





Endocrine


History of Endocrine Disorders:  Yes (TYPE 1 DIABETES, WITH INSULIN PUMP AS OF 

01/09/20-EXTREME NON-COMPLIANCE)


Endocrine Disorders:  Diabetes, Insulin dep





HEENT


History of HEENT Disorders:  No





Cancer


History of Cancer:  No





Psychosocial


History of Psychiatric Problem:  Yes (POLYSUBSTANCE ABUSE)


Behavioral Health Disorders:  Anxiety, Bipolar, Depression





Integumentary


History of Skin or Integumenta:  Yes


Skin/Integumentary Disorders:  Eczema





Blood Transfusions


History of Blood Disorders:  No


Adverse Reaction to a Blood Tr:  No





Family Medical History


Significant Family History:  Cancer, Diabetes, Hypertension (father)


Family Medial History:  


Diabetes mellitus


  19 FATHER


  UNCLE


FH: COPD (chronic obstructive pulmonary disease)


  19 MOTHER


FH: brain cancer


  AUNT


FH: diverticulitis


  19 FATHER


Thyroid disease


  19 MOTHER





Review of Systems-General


Constitutional:  chills, diaphoresis, malaise, weakness


EENTM:  No blurred vision, No double vision, No mouth pain, No mouth swelling, 

No epistaxis


Respiratory:  No cough, No dyspnea on exertion


Cardiovascular:  No chest pain, No Hx of Intervention, No palpitations


Gastrointestinal:  No abdominal pain, No jaundice, No nausea, No vomiting


Genitourinary:  No dysuria, No frequency, No hematuria


Musculoskeletal:  joint pain, joint swelling, muscle pain, muscle stiffness


Skin:  see HPI; No lesions, No pruritus


Psychiatric/Neurological:  Anxiety, Depressed; Denies Seizure, Denies Tremors


Other


Pt denies any hx of abnormal bleeding or bruising





Physical Exam-General Problems


Physical Exam


Vital Signs





Vital Signs - First Documented








 2/15/21





 15:20


 


Temp 37.5


 


Pulse 125


 


Resp 25


 


B/P (MAP) 139/103 (115)


 


Pulse Ox 99


 


O2 Delivery Room Air





Capillary Refill : Less Than 3 Seconds


General Appearance:  WD/WN, no apparent distress


Eyes:  Bilateral Eye PERRL, Bilateral Eye EOMI


HEENT:  pharynx normal; No scleral icterus (R), No scleral icterus (L)


Neck:  non-tender, full range of motion, supple


Respiratory:  lungs clear, normal breath sounds, no respiratory distress, no 

accessory muscle use


Cardiovascular:  regular rate, rhythm, no murmur


Gastrointestinal:  non tender, soft, no organomegaly, no pulsatile mass


Back:  no CVA tenderness, no vertebral tenderness


Extremities:  no calf tenderness, normal capillary refill, other (pt has +2-3 

edema of right foot, with erythema over entire foot and into ankle, more 

erythema on lateral aspect of foot, draining a purulent fluid from bottom of 

foot and top, with blister of fluid and sloughing of skin near 5th toe )


Neurologic/Psychiatric:  CNs II-XII nml as tested, alert, oriented x 3


Skin:  normal color, warm/dry


Lymphatic:  no adenopathy (neck, axilla or groin)





Data Review


Labs


Laboratory Tests


2/15/21 15:24: Glucometer 591*H


2/15/21 15:27: 


White Blood Count 20.6H, Red Blood Count 4.42, Hemoglobin 13.3, Hematocrit 38L, 

Mean Corpuscular Volume 86, Mean Corpuscular Hemoglobin 30, Mean Corpuscular 

Hemoglobin Concent 35, Red Cell Distribution Width 12.0, Platelet Count 482H, 

Mean Platelet Volume 9.4, Immature Granulocyte % (Auto) 0, Neutrophils (%) 

(Auto) 82H, Lymphocytes (%) (Auto) 11L, Monocytes (%) (Auto) 6, Eosinophils (%) 

(Auto) 0, Basophils (%) (Auto) 0, Neutrophils # (Auto) 17.0H, Lymphocytes # 

(Auto) 2.2, Monocytes # (Auto) 1.3H, Eosinophils # (Auto) 0.0, Basophils # 

(Auto) 0.1, Immature Granulocyte # (Auto) 0.1, Neutrophils % (Manual) 78, 

Lymphocytes % (Manual) 13, Monocytes % (Manual) 8, Basophils % (Manual) 1, Blood

Morphology Comment NORMAL, Erythrocyte Sedimentation Rate 67H, Prothrombin Time 

13.6, INR Comment 1.0, Activated Partial Thromboplast Time 35, Sodium Level 128L

, Potassium Level 3.8, Chloride Level 91L, Carbon Dioxide Level 21, Anion Gap 

16H, Blood Urea Nitrogen 8, Creatinine 1.28, Estimat Glomerular Filtration Rate 

> 60, BUN/Creatinine Ratio 6, Glucose Level 685*H, Lactic Acid Level 3.97*H, 

Calcium Level 8.9, Corrected Calcium 9.2, Magnesium Level 2.3, Total Bilirubin 

0.4, Aspartate Amino Transf (AST/SGOT) 9, Alanine Aminotransferase (ALT/SGPT) 

18, Alkaline Phosphatase 195H, C-Reactive Protein High Sensitivity 22.15H, Total

Protein 7.6, Albumin 3.6, Beta-Hydroxybutyrate (Chem panel) 0.96H, Procalcitonin

0.22H


2/15/21 16:54: 


Urine Color YELLOW, Urine Clarity CLEAR, Urine pH 6.0, Urine Specific Gravity 

<=1.005, Urine Protein NEGATIVE, Urine Glucose (UA) 3+H, Urine Ketones 1+H, 

Urine Nitrite NEGATIVE, Urine Bilirubin NEGATIVE, Urine Urobilinogen 0.2, Urine 

Leukocyte Esterase NEGATIVE, Urine RBC (Auto) 1+H, Urine RBC 0-2, Urine WBC 

NONE, Urine Squamous Epithelial Cells NONE, Urine Crystals NONE, Urine Bacteria 

NEGATIVE, Urine Casts NONE, Urine Mucus NEGATIVE, Urine Culture Indicated NO, 

Urine Opiates Screen NEGATIVE, Urine Oxycodone Screen NEGATIVE, Urine Methadone 

Screen NEGATIVE, Urine Propoxyphene Screen NEGATIVE, Urine Barbiturates Screen 

NEGATIVE, Ur Tricyclic Antidepressants Screen NEGATIVE, Urine Phencyclidine 

Screen NEGATIVE, Urine Amphetamines Screen NEGATIVE, Urine Methamphetamines 

Screen NEGATIVE, Urine Benzodiazepines Screen NEGATIVE, Urine Cocaine Screen 

NEGATIVE, Urine Cannabinoids Screen NEGATIVE


2/15/21 17:32: Lactic Acid Level 1.86





Radiology


Date of Exam:02/15/21





CT EXTREMITY LOWER RIGHT WO








PROCEDURE: CT right lower extremity without contrast.





TECHNIQUE: Axially acquired CT was obtained through the right


lower extremity without intravenous contrast. Coronal and


sagittal reformations were also performed. Auto Exposure Controls


were utilized during the CT exam to meet ALARA standards for


radiation dose reduction. 





INDICATION: Diabetes, right foot infection with swollen soft


tissues that are red.





COMPARISON: Right foot radiographs of 02/15/2021.





FINDINGS:





Soft tissues: No gas is noted within the soft tissues of the foot


that would suggest necrotizing fasciitis. There is a moderate


amount of dorsal subcutaneous edema and/or cellulitis. This


cannot be differentiated by imaging. There does not appear to be


a deep fluid collection within the foot, although soft tissue


contrast is suboptimal by CT when compared to MRI.





Bones: No fracture or osseous erosions. No periosteal reaction is


noted. No osseous coalition.





IMPRESSION:


1. No soft tissue gas to suggest necrotizing fasciitis.


2. Extensive soft tissue swelling in the dorsal aspect of the


foot. By CT, there is no identifiable fluid collection to suggest


a drainable abscess.


3. No CT features of osteomyelitis.





Dictated by: 





  Dictated on workstation # [id number]








Dict:   02/15/21 [id number]


Trans:   02/15/21 1759


[id number]





Interpreted by:     ANTWON CHATTERJEE MD


Electronically signed by: ANTWON CHATTERJEE MD 02/15/21 1759





Assessment/Plan


Abscess/Cellulitis Right foot


DM - uncontrolled


DKA





Plan is to get pt on IV ABX, pain control, IV fluids and get blood sugar under 

control; in fact he is going to the ICU.  I reviewed the CT films myself and 

believe there may be a fluid collection close to the plantar aspect of the foot.

 If it does not start getting better with IV ABX


he will need to go to the OR for I&D with possible debridement.  Risks and 

complications discussed with pt; not limited to pain, bleeding, infection, scar 

and contracture of foot.  All questions answered to his satisfaction; he just 

wants "to be completely out if any surgery is


done".











HETAL CONRAD DO               Feb 15, 2021 18:03

## 2021-02-16 LAB
ALBUMIN SERPL-MCNC: 2.9 GM/DL (ref 3.2–4.5)
ALP SERPL-CCNC: 143 U/L (ref 40–136)
ALT SERPL-CCNC: 13 U/L (ref 0–55)
BASOPHILS # BLD AUTO: 0.1 10^3/UL (ref 0–0.1)
BASOPHILS NFR BLD AUTO: 0 % (ref 0–10)
BILIRUB SERPL-MCNC: 0.4 MG/DL (ref 0.1–1)
BUN/CREAT SERPL: 7
CALCIUM SERPL-MCNC: 7.9 MG/DL (ref 8.5–10.1)
CHLORIDE SERPL-SCNC: 102 MMOL/L (ref 98–107)
CO2 SERPL-SCNC: 22 MMOL/L (ref 21–32)
CREAT SERPL-MCNC: 0.88 MG/DL (ref 0.6–1.3)
EOSINOPHIL # BLD AUTO: 0.3 10^3/UL (ref 0–0.3)
EOSINOPHIL NFR BLD AUTO: 2 % (ref 0–10)
GFR SERPLBLD BASED ON 1.73 SQ M-ARVRAT: > 60 ML/MIN
GLUCOSE SERPL-MCNC: 263 MG/DL (ref 70–105)
HCT VFR BLD CALC: 31 % (ref 40–54)
HGB BLD-MCNC: 10.7 G/DL (ref 13.3–17.7)
LYMPHOCYTES # BLD AUTO: 3.1 10^3/UL (ref 1–4)
LYMPHOCYTES NFR BLD AUTO: 18 % (ref 12–44)
MAGNESIUM SERPL-MCNC: 2 MG/DL (ref 1.6–2.4)
MANUAL DIFFERENTIAL PERFORMED BLD QL: NO
MCH RBC QN AUTO: 30 PG (ref 25–34)
MCHC RBC AUTO-ENTMCNC: 34 G/DL (ref 32–36)
MCV RBC AUTO: 87 FL (ref 80–99)
MONOCYTES # BLD AUTO: 1.4 10^3/UL (ref 0–1)
MONOCYTES NFR BLD AUTO: 8 % (ref 0–12)
NEUTROPHILS # BLD AUTO: 11.9 10^3/UL (ref 1.8–7.8)
NEUTROPHILS NFR BLD AUTO: 71 % (ref 42–75)
PHOSPHATE SERPL-MCNC: 1.9 MG/DL (ref 2.3–4.7)
PLATELET # BLD: 390 10^3/UL (ref 130–400)
PMV BLD AUTO: 9.7 FL (ref 9–12.2)
POTASSIUM SERPL-SCNC: 3.8 MMOL/L (ref 3.6–5)
PROT SERPL-MCNC: 6 GM/DL (ref 6.4–8.2)
SODIUM SERPL-SCNC: 134 MMOL/L (ref 135–145)
WBC # BLD AUTO: 16.7 10^3/UL (ref 4.3–11)

## 2021-02-16 RX ADMIN — SODIUM CHLORIDE SCH MLS/HR: 900 INJECTION, SOLUTION INTRAVENOUS at 05:57

## 2021-02-16 RX ADMIN — INSULIN ASPART SCH UNIT: 100 INJECTION, SOLUTION INTRAVENOUS; SUBCUTANEOUS at 04:53

## 2021-02-16 RX ADMIN — INSULIN ASPART SCH UNIT: 100 INJECTION, SOLUTION INTRAVENOUS; SUBCUTANEOUS at 07:57

## 2021-02-16 RX ADMIN — INSULIN ASPART SCH UNIT: 100 INJECTION, SOLUTION INTRAVENOUS; SUBCUTANEOUS at 00:48

## 2021-02-16 RX ADMIN — SODIUM CHLORIDE, SODIUM LACTATE, POTASSIUM CHLORIDE, AND CALCIUM CHLORIDE SCH MLS/HR: 600; 310; 30; 20 INJECTION, SOLUTION INTRAVENOUS at 07:56

## 2021-02-16 NOTE — PROGRESS NOTE - SURGERY
Subjective


Date Seen by a Provider:  Feb 16, 2021


Time Seen by a Provider:  07:15


Subjective/Events-last exam


Pt seen and examined this AM. States he feels like his foot is doing better. 

Redness and swelling have decreased. Still has a "pressure" pain but also not as

bad. Denies fever, chills, SOB or chest pain. Asks when he will be able to eat.


Review of Systems


General:  No Chills; Appetite


HEENT:  No Head Aches


Pulmonary:  No Dyspnea, No Pleuritic Chest Pain


Cardiovascular:  No: Chest Pain, Palpitations


Gastrointestinal:  No: Nausea, Vomiting, Abdominal Pain


Musculoskeletal:  foot pain (right foot)


Neurological:  Numbness (BLE)





Focused Exam


Lactate Level


2/15/21 15:27: Lactic Acid Level 3.97*H


2/15/21 17:32: Lactic Acid Level 1.86





Objective


Exam





Vital Signs








  Date Time  Temp Pulse Resp B/P (MAP) Pulse Ox O2 Delivery O2 Flow Rate FiO2


 


2/16/21 07:00  81 18 123/82 (96) 97 Room Air  


 


2/16/21 06:00  81 31 130/87 (101) 97 Room Air  


 


2/16/21 05:00  92 14 132/83 (99) 99 Room Air  


 


2/16/21 04:00     97 Room Air  


 


2/16/21 04:00 37.0       


 


2/16/21 04:00  82 9 127/84 (98) 98 Room Air  


 


2/16/21 03:00  80 19 116/76 (89) 97 Room Air  


 


2/16/21 02:00  86 10 108/71 (83) 97 Room Air  


 


2/16/21 01:00  90      


 


2/16/21 01:00  90 26 126/81 (96) 98 Room Air  


 


2/16/21 00:01 36.7       


 


2/16/21 00:00  97 11 116/75 (89) 94 Room Air  


 


2/16/21 00:00     97 Room Air  


 


2/15/21 23:00  89 22 116/75 (89) 95 Room Air  


 


2/15/21 22:00  100 10 123/77 (92) 98 Room Air  


 


2/15/21 21:00  94 10 127/80 (96) 96 Room Air  


 


2/15/21 20:30  98 13 136/82 (100) 99 Room Air  


 


2/15/21 20:15  96 8 128/81 (97) 96 Room Air  


 


2/15/21 20:00     97 Room Air  


 


2/15/21 20:00  106 12 133/80 (97) 98 Room Air  


 


2/15/21 19:45  102 21 133/81 (98) 98 Room Air  


 


2/15/21 19:42 37.1 109 20 129/74 (92) 99 Room Air  


 


2/15/21 19:29     99 Room Air  


 


2/15/21 19:27  105      


 


2/15/21 19:10 37.0 98 18 128/77 (115) 98 Room Air  


 


2/15/21 15:20 37.5 125 25 139/103 (115) 99 Room Air  














I & O 


 


 2/16/21





 07:00


 


Intake Total 2790 ml


 


Output Total 1100 ml


 


Balance 1690 ml





Capillary Refill : Less Than 3 Seconds


General Appearance:  No Apparent Distress, Thin


HEENT:  PERRL/EOMI; No Moist Mucous Membranes; Other


Respiratory:  Normal Breath Sounds, No Accessory Muscle Use, No Respiratory 

Distress


Cardiovascular:  Regular Rate, Rhythm, No Murmur


Gastrointestinal:  non tender, soft; No distended


Extremity:  Other (edema of right foot,erythema to lateral aspect of foot and 

lateral right ankle, blistering around 5th toe, not actively draining but some 

dried drainage noted on bed sheets)


Neurologic/Psychiatric:  Alert, Depressed Affect


Skin:  Warm/Dry, Erythema (R lateral foot and ankle)





Results


Lab


Laboratory Tests


2/15/21 15:24: Glucometer 591*H


2/15/21 15:27: 


White Blood Count 20.6H, Red Blood Count 4.42, Hemoglobin 13.3, Hematocrit 38L, 

Mean Corpuscular Volume 86, Mean Corpuscular Hemoglobin 30, Mean Corpuscular 

Hemoglobin Concent 35, Red Cell Distribution Width 12.0, Platelet Count 482H, 

Mean Platelet Volume 9.4, Immature Granulocyte % (Auto) 0, Neutrophils (%) 

(Auto) 82H, Lymphocytes (%) (Auto) 11L, Monocytes (%) (Auto) 6, Eosinophils (%) 

(Auto) 0, Basophils (%) (Auto) 0, Neutrophils # (Auto) 17.0H, Lymphocytes # 

(Auto) 2.2, Monocytes # (Auto) 1.3H, Eosinophils # (Auto) 0.0, Basophils # 

(Auto) 0.1, Immature Granulocyte # (Auto) 0.1, Neutrophils % (Manual) 78, Lymph

ocytes % (Manual) 13, Monocytes % (Manual) 8, Basophils % (Manual) 1, Blood 

Morphology Comment NORMAL, Erythrocyte Sedimentation Rate 67H, Prothrombin Time 

13.6, INR Comment 1.0, Activated Partial Thromboplast Time 35, Sodium Level 128L

, Potassium Level 3.8, Chloride Level 91L, Carbon Dioxide Level 21, Anion Gap 

16H, Blood Urea Nitrogen 8, Creatinine 1.28, Estimat Glomerular Filtration Rate 

> 60, BUN/Creatinine Ratio 6, Glucose Level 685*H, Lactic Acid Level 3.97*H, 

Calcium Level 8.9, Corrected Calcium 9.2, Magnesium Level 2.3, Total Bilirubin 

0.4, Aspartate Amino Transf (AST/SGOT) 9, Alanine Aminotransferase (ALT/SGPT) 

18, Alkaline Phosphatase 195H, C-Reactive Protein High Sensitivity 22.15H, Total

Protein 7.6, Albumin 3.6, Beta-Hydroxybutyrate (Chem panel) 0.96H, Procalcitonin

0.22H


2/15/21 16:54: 


Urine Color YELLOW, Urine Clarity CLEAR, Urine pH 6.0, Urine Specific Gravity 

<=1.005, Urine Protein NEGATIVE, Urine Glucose (UA) 3+H, Urine Ketones 1+H, 

Urine Nitrite NEGATIVE, Urine Bilirubin NEGATIVE, Urine Urobilinogen 0.2, Urine 

Leukocyte Esterase NEGATIVE, Urine RBC (Auto) 1+H, Urine RBC 0-2, Urine WBC 

NONE, Urine Squamous Epithelial Cells NONE, Urine Crystals NONE, Urine Bacteria 

NEGATIVE, Urine Casts NONE, Urine Mucus NEGATIVE, Urine Culture Indicated NO, 

Urine Opiates Screen NEGATIVE, Urine Oxycodone Screen NEGATIVE, Urine Methadone 

Screen NEGATIVE, Urine Propoxyphene Screen NEGATIVE, Urine Barbiturates Screen 

NEGATIVE, Ur Tricyclic Antidepressants Screen NEGATIVE, Urine Phencyclidine Scre

en NEGATIVE, Urine Amphetamines Screen NEGATIVE, Urine Methamphetamines Screen 

NEGATIVE, Urine Benzodiazepines Screen NEGATIVE, Urine Cocaine Screen NEGATIVE, 

Urine Cannabinoids Screen NEGATIVE


2/15/21 17:32: Lactic Acid Level 1.86


2/15/21 20:00: 


Sodium Level 137, Potassium Level 3.8, Chloride Level 101, Carbon Dioxide Level 

24, Anion Gap 12, Blood Urea Nitrogen 6L, Creatinine 0.87, Estimat Glomerular 

Filtration Rate > 60, BUN/Creatinine Ratio 7, Glucose Level 98, Glucometer 94, 

Calcium Level 8.1L


2/15/21 20:03: Glucometer 104


2/15/21 22:00: Glucometer 230H


2/16/21 00:31: Glucometer 319H


2/16/21 03:22: 


White Blood Count 16.7H, Red Blood Count 3.59L, Hemoglobin 10.7L, Hematocrit 31L

, Mean Corpuscular Volume 87, Mean Corpuscular Hemoglobin 30, Mean Corpuscular 

Hemoglobin Concent 34, Red Cell Distribution Width 11.7, Platelet Count 390, 

Mean Platelet Volume 9.7, Immature Granulocyte % (Auto) 1, Neutrophils (%) 

(Auto) 71, Lymphocytes (%) (Auto) 18, Monocytes (%) (Auto) 8, Eosinophils (%) 

(Auto) 2, Basophils (%) (Auto) 0, Neutrophils # (Auto) 11.9H, Lymphocytes # 

(Auto) 3.1, Monocytes # (Auto) 1.4H, Eosinophils # (Auto) 0.3, Basophils # 

(Auto) 0.1, Immature Granulocyte # (Auto) 0.1, Sodium Level 134L, Potassium 

Level 3.8, Chloride Level 102, Carbon Dioxide Level 22, Anion Gap 10, Blood Urea

Nitrogen 6L, Creatinine 0.88, Estimat Glomerular Filtration Rate > 60, 

BUN/Creatinine Ratio 7, Glucose Level 263H, Calcium Level 7.9L, Corrected 

Calcium 8.8, Phosphorus Level 1.9L, Magnesium Level 2.0, Total Bilirubin 0.4, 

Aspartate Amino Transf (AST/SGOT) 7, Alanine Aminotransferase (ALT/SGPT) 13, 

Alkaline Phosphatase 143H, Total Protein 6.0L, Albumin 2.9L, Beta-

Hydroxybutyrate (Chem panel) 0.35H





Microbiology


2/15/21 Gram Stain - Final, Resulted


          


2/15/21 Wound Culture - Preliminary, Resulted


          Staphylococcus aureus





Assessment/Plan


Abscess/Cellulitis Right foot


DM - uncontrolled


DKA





Continue IV antibiotics, pain control, fluids and glucose control


Edema and erythema seem to be improving with abx, can consider I&D in OR if it 

progresses











ARMANDO MCCLENDON,MED STUDENT    Feb 16, 2021 07:27

## 2021-02-16 NOTE — PULMONARY CONSULTATION
History of Present Illness


History of Present Illness


Date Seen by Provider:  Feb 16, 2021


Time Seen by Provider:  04:37


Date of Admission








Allergies and Home Medications


Allergies


Coded Allergies:  


     fluoxetine (Verified  Allergy, Unknown, 1/9/20)


     trazodone (Verified  Allergy, Unknown, 1/9/20)





Home Medications


Amoxicillin/Potassium Clav 1 Each Tablet, 1 EACH PO BID


   Prescribed by: OLEG PIERSON on 1/11/20 1312


Hydrocodone/Acetaminophen 1 Each Tablet, 1 EACH PO Q4-6HR PRN for PAIN-MODERATE


   Prescribed by: MARTA BOONE on 4/27/20 1408


Insulin Lispro 100 Unit/1 Ml Vial, SC UD, (Reported)


   80 UNITS PER DAY PER INSULIN PUMP 


Sulfamethoxazole/Trimethoprim 1 Each Tablet, 1 EACH PO BID


   Prescribed by: MARTA BOONE on 4/27/20 1407


Sulfamethoxazole/Trimethoprim 1 Each Tablet, 1 EACH PO TID


   Prescribed by: ANTHONY THOMPSON on 4/29/20 1145





Past Medical-Social-Family Hx


Past Med/Social Hx:  Reviewed and Corrections made


Patient Social History


Alcohol Use:  Denies Use


Drug of Choice:  THC, METH


Smoking Status:  Current Everyday Smoker


Type Used:  Cigarettes


2nd Hand Smoke Exposure:  Yes


Recent Infectious Disease Expo:  No


Recent Hopitalizations:  No


Have you traveled recently?:  No


Substance type:  Methamphetamine


Alcohol Use?:  No





Immunizations Up To Date


Tetanus Booster (TDap):  Unknown


PED Vaccines UTD:  Yes





Seasonal Allergies


Seasonal Allergies:  No





Past Medical History


Surgeries:  Yes (ABSCESS I&D'S; LEFT HIP FX/ORIF 2017)


Orthopedic, Tonsillectomy


Respiratory:  Yes


Asthma


Currently Using CPAP:  No


Currently Using BIPAP:  No


Cardiac:  No


Neurological:  Yes


Neuropathy


Reproductive Disorders:  No


Sexually Transmitted Disease:  No


HIV/AIDS:  No


Genitourinary:  Yes


Prostate Problems


Gastrointestinal:  Yes


Gastroesophageal Reflux, Liver Disease/Jaundice, Chronic Constipation


Musculoskeletal:  Yes (LEFT HIP FX/ORIF 2017)


Fractures


Endocrine:  Yes (TYPE 1 DIABETES, WITH INSULIN PUMP AS OF 01/09/20-EXTREME NON-

COMPLIANCE)


Diabetes, Insulin dep


HEENT:  No


Cancer:  No


Psychosocial:  Yes (POLYSUBSTANCE ABUSE)


Anxiety, Bipolar, Depression


Integumentary:  Yes


Eczema


Blood Disorders:  No


Adverse Reaction/Blood Tranf:  No





Family Medical History





Diabetes mellitus


  19 FATHER


  UNCLE


FH: COPD (chronic obstructive pulmonary disease)


  19 MOTHER


FH: brain cancer


  AUNT


FH: diverticulitis


  19 FATHER


Thyroid disease


  19 MOTHER


Cancer, Diabetes, Hypertension (father)





SOCIAL HISTORY:


-ETOH-DENIES USE


-DRUGS--SMOKES METH AND MARIJUANA. DENIES IV USE


-SMOKES 1 PPD





PAST SURGICAL HISTORY:


-TONSILLECTOMY


-EGD


-MULTIPLE I&D'S OF ABSCESSES


-LEFT HIP FRACTURE/ORIF 2017





LONG HISTORY OF EXTREME NON-COMPLIANCE IN ALL ASPECTS OF CARE





Review of Systems


Time Seen by Provider:  04:44





Sepsis Event


Evaluation


Height, Weight, BMI


Height: 5'10.00"


Weight: 160lbs. 3.0oz. [id number]; 20.71 BMI


Method:Stated





Exam


Exam





Vital Signs








  Date Time  Temp Pulse Resp B/P (MAP) Pulse Ox O2 Delivery O2 Flow Rate FiO2


 


2/16/21 03:00  80 19 116/76 (89) 97 Room Air  


 


2/16/21 02:00  86 10 108/71 (83) 97 Room Air  


 


2/16/21 01:00  90      


 


2/16/21 01:00  90 26 126/81 (96) 98 Room Air  


 


2/16/21 00:00  97 11 116/75 (89) 94 Room Air  


 


2/15/21 23:00  89 22 116/75 (89) 95 Room Air  


 


2/15/21 22:00  100 10 123/77 (92) 98 Room Air  


 


2/15/21 21:00  94 10 127/80 (96) 96 Room Air  


 


2/15/21 20:30  98 13 136/82 (100) 99 Room Air  


 


2/15/21 20:15  96 8 128/81 (97) 96 Room Air  


 


2/15/21 20:00     97 Room Air  


 


2/15/21 20:00  106 12 133/80 (97) 98 Room Air  


 


2/15/21 19:45  102 21 133/81 (98) 98 Room Air  


 


2/15/21 19:42 37.1 109 20 129/74 (92) 99 Room Air  


 


2/15/21 19:29     99 Room Air  


 


2/15/21 19:27  105      


 


2/15/21 15:20 37.5 125 25 139/103 (115) 99 Room Air  














I & O 


 


 2/16/21





 07:00


 


Intake Total 2790 ml


 


Output Total 400 ml


 


Balance 2390 ml








Height & Weight


Height: 5'10.00"


Weight: 160lbs. 3.0oz. [id number]; 20.71 BMI


Method:Stated


General Appearance:  No Apparent Distress, Thin, Other (CONSTANT MOVEMENTS OF 

BODY, ESPECIALLY FEET)


HEENT:  Other (ORAL MUCOSA DRY)


Respiratory:  Normal Breath Sounds, No Accessory Muscle Use, No Respiratory 

Distress


Cardiovascular:  Regular Rate, Rhythm, No Murmur


Capillary Refill:  Less Than 3 Seconds


Gastrointestinal:  non tender, soft, no organomegaly, no pulsatile mass


Extremity:  Other (RIGHT FOOT AND ANKLE WITH SIGNIFICANT SWELLING AND ERYTHEMA. 

DORSAL AND PLANTAR ASPECT OF DISTAL / LATERAL ASPECT WITH LARGE AREA OF SUB Q 

PURULENCE)


Neurologic/Psychiatric:  Alert


Skin:  Normal Color, Warm/Dry


Lymphatic:  No Adenopathy





Results


Lab


Laboratory Tests


2/15/21 15:27








2/15/21 20:00








2/16/21 03:22











Assessment/Plan


Assessment/Plan


DKA with hx of IDDM 


   -Start Levemir will give 10 units now x 1 then 10 units HS 


   -SSI C with Accu checks Q 4 


Right foot cellulitis with diabetic neuropathy 


   -Continue Zosyn and Vanco 


   -Pan cultures pending 


   -Wound care consulted 


   -Surgery is consulted











JEANETTE CHA DO              Feb 16, 2021 04:44

## 2021-02-16 NOTE — DIAGNOSTIC IMAGING REPORT
INDICATION: Sepsis. Diabetic ketoacidosis



COMPARISON: 11/18/2019



FINDINGS: Single frontal view of the chest demonstrates normal

heart size and pulmonary vascularity. The lungs are well aerated

and clear. No large pleural effusion or pneumothorax is seen. The

visualized osseous structures show no acute abnormalities.



IMPRESSION: 

1. No acute cardiopulmonary process.  



Dictated by: 



  Dictated on workstation # [id number]

## 2021-03-22 NOTE — DIAGNOSTIC IMAGING REPORT
EXAMINATION: Chest 2 view



HISTORY: Chest pain



FINDINGS: Comparison is 05/07/2017.



The lungs are clear. No edema. No pneumonia. No pleural effusion.

No pneumothorax. Heart is normal in size.



IMPRESSION:



1. Clear lungs.



Dictated by: 



  Dictated on workstation # [id number] Attending Only